# Patient Record
Sex: FEMALE | Race: WHITE | NOT HISPANIC OR LATINO | Employment: OTHER | RURAL
[De-identification: names, ages, dates, MRNs, and addresses within clinical notes are randomized per-mention and may not be internally consistent; named-entity substitution may affect disease eponyms.]

---

## 2021-08-19 VITALS
HEART RATE: 66 BPM | BODY MASS INDEX: 31.08 KG/M2 | SYSTOLIC BLOOD PRESSURE: 116 MMHG | DIASTOLIC BLOOD PRESSURE: 70 MMHG | HEIGHT: 69 IN | WEIGHT: 209.81 LBS

## 2021-08-19 RX ORDER — ALPRAZOLAM 0.25 MG/1
TABLET ORAL 3 TIMES DAILY
COMMUNITY
End: 2022-08-23 | Stop reason: SDUPTHER

## 2021-08-19 RX ORDER — METOPROLOL SUCCINATE 25 MG/1
25 TABLET, EXTENDED RELEASE ORAL DAILY
COMMUNITY
End: 2021-08-23 | Stop reason: SDUPTHER

## 2021-08-19 RX ORDER — ATORVASTATIN CALCIUM 20 MG/1
20 TABLET, FILM COATED ORAL DAILY
COMMUNITY
End: 2021-08-23 | Stop reason: SDUPTHER

## 2021-08-19 RX ORDER — ASPIRIN 325 MG
81 TABLET ORAL DAILY
COMMUNITY

## 2021-08-19 RX ORDER — LANSOPRAZOLE 30 MG/1
30 CAPSULE, DELAYED RELEASE ORAL DAILY
COMMUNITY
End: 2021-08-23 | Stop reason: SDUPTHER

## 2021-08-19 RX ORDER — OLMESARTAN MEDOXOMIL AND HYDROCHLOROTHIAZIDE 40/25 40; 25 MG/1; MG/1
1 TABLET ORAL DAILY
COMMUNITY
End: 2021-08-23 | Stop reason: SDUPTHER

## 2021-08-23 ENCOUNTER — OFFICE VISIT (OUTPATIENT)
Dept: FAMILY MEDICINE | Facility: CLINIC | Age: 81
End: 2021-08-23
Payer: MEDICARE

## 2021-08-23 VITALS
HEART RATE: 72 BPM | DIASTOLIC BLOOD PRESSURE: 80 MMHG | TEMPERATURE: 97 F | RESPIRATION RATE: 16 BRPM | SYSTOLIC BLOOD PRESSURE: 140 MMHG | BODY MASS INDEX: 30.66 KG/M2 | WEIGHT: 207 LBS | HEIGHT: 69 IN

## 2021-08-23 DIAGNOSIS — I10 HYPERTENSION, UNSPECIFIED TYPE: Primary | ICD-10-CM

## 2021-08-23 DIAGNOSIS — E78.5 HYPERLIPIDEMIA, UNSPECIFIED HYPERLIPIDEMIA TYPE: ICD-10-CM

## 2021-08-23 DIAGNOSIS — K21.9 GASTROESOPHAGEAL REFLUX DISEASE, UNSPECIFIED WHETHER ESOPHAGITIS PRESENT: ICD-10-CM

## 2021-08-23 LAB
ALBUMIN SERPL BCP-MCNC: 3.4 G/DL (ref 3.5–5)
ALBUMIN/GLOB SERPL: 0.8 {RATIO}
ALP SERPL-CCNC: 103 U/L (ref 55–142)
ALT SERPL W P-5'-P-CCNC: 23 U/L (ref 13–56)
ANION GAP SERPL CALCULATED.3IONS-SCNC: 10 MMOL/L (ref 7–16)
AST SERPL W P-5'-P-CCNC: 20 U/L (ref 15–37)
BASOPHILS # BLD AUTO: 0.06 K/UL (ref 0–0.2)
BASOPHILS NFR BLD AUTO: 0.7 % (ref 0–1)
BILIRUB SERPL-MCNC: 0.6 MG/DL (ref 0–1.2)
BUN SERPL-MCNC: 17 MG/DL (ref 7–18)
BUN/CREAT SERPL: 23 (ref 6–20)
CALCIUM SERPL-MCNC: 9 MG/DL (ref 8.5–10.1)
CHLORIDE SERPL-SCNC: 103 MMOL/L (ref 98–107)
CHOLEST SERPL-MCNC: 136 MG/DL (ref 0–200)
CHOLEST/HDLC SERPL: 3 {RATIO}
CO2 SERPL-SCNC: 31 MMOL/L (ref 21–32)
CREAT SERPL-MCNC: 0.75 MG/DL (ref 0.55–1.02)
DIFFERENTIAL METHOD BLD: ABNORMAL
EOSINOPHIL # BLD AUTO: 0.23 K/UL (ref 0–0.5)
EOSINOPHIL NFR BLD AUTO: 2.8 % (ref 1–4)
ERYTHROCYTE [DISTWIDTH] IN BLOOD BY AUTOMATED COUNT: 13.2 % (ref 11.5–14.5)
GLOBULIN SER-MCNC: 4.3 G/DL (ref 2–4)
GLUCOSE SERPL-MCNC: 112 MG/DL (ref 74–106)
HCT VFR BLD AUTO: 41.9 % (ref 38–47)
HDLC SERPL-MCNC: 46 MG/DL (ref 40–60)
HGB BLD-MCNC: 13.3 G/DL (ref 12–16)
IMM GRANULOCYTES # BLD AUTO: 0.03 K/UL (ref 0–0.04)
IMM GRANULOCYTES NFR BLD: 0.4 % (ref 0–0.4)
LDLC SERPL CALC-MCNC: 66 MG/DL
LDLC/HDLC SERPL: 1.4 {RATIO}
LYMPHOCYTES # BLD AUTO: 1.58 K/UL (ref 1–4.8)
LYMPHOCYTES NFR BLD AUTO: 19.1 % (ref 27–41)
MCH RBC QN AUTO: 28.6 PG (ref 27–31)
MCHC RBC AUTO-ENTMCNC: 31.7 G/DL (ref 32–36)
MCV RBC AUTO: 90.1 FL (ref 80–96)
MONOCYTES # BLD AUTO: 0.6 K/UL (ref 0–0.8)
MONOCYTES NFR BLD AUTO: 7.2 % (ref 2–6)
MPC BLD CALC-MCNC: 11 FL (ref 9.4–12.4)
NEUTROPHILS # BLD AUTO: 5.79 K/UL (ref 1.8–7.7)
NEUTROPHILS NFR BLD AUTO: 69.8 % (ref 53–65)
NONHDLC SERPL-MCNC: 90 MG/DL
NRBC # BLD AUTO: 0 X10E3/UL
NRBC, AUTO (.00): 0 %
PLATELET # BLD AUTO: 338 K/UL (ref 150–400)
POTASSIUM SERPL-SCNC: 3.7 MMOL/L (ref 3.5–5.1)
PROT SERPL-MCNC: 7.7 G/DL (ref 6.4–8.2)
RBC # BLD AUTO: 4.65 M/UL (ref 4.2–5.4)
SODIUM SERPL-SCNC: 140 MMOL/L (ref 136–145)
TRIGL SERPL-MCNC: 120 MG/DL (ref 35–150)
VLDLC SERPL-MCNC: 24 MG/DL
WBC # BLD AUTO: 8.29 K/UL (ref 4.5–11)

## 2021-08-23 PROCEDURE — 80053 COMPREHEN METABOLIC PANEL: CPT | Mod: ,,, | Performed by: CLINICAL MEDICAL LABORATORY

## 2021-08-23 PROCEDURE — 85025 COMPLETE CBC W/AUTO DIFF WBC: CPT | Mod: ,,, | Performed by: CLINICAL MEDICAL LABORATORY

## 2021-08-23 PROCEDURE — 80061 LIPID PANEL: ICD-10-PCS | Mod: ,,, | Performed by: CLINICAL MEDICAL LABORATORY

## 2021-08-23 PROCEDURE — 80053 COMPREHENSIVE METABOLIC PANEL: ICD-10-PCS | Mod: ,,, | Performed by: CLINICAL MEDICAL LABORATORY

## 2021-08-23 PROCEDURE — 85025 CBC WITH DIFFERENTIAL: ICD-10-PCS | Mod: ,,, | Performed by: CLINICAL MEDICAL LABORATORY

## 2021-08-23 PROCEDURE — 99214 OFFICE O/P EST MOD 30 MIN: CPT | Mod: ,,, | Performed by: FAMILY MEDICINE

## 2021-08-23 PROCEDURE — 80061 LIPID PANEL: CPT | Mod: ,,, | Performed by: CLINICAL MEDICAL LABORATORY

## 2021-08-23 PROCEDURE — 99214 PR OFFICE/OUTPT VISIT, EST, LEVL IV, 30-39 MIN: ICD-10-PCS | Mod: ,,, | Performed by: FAMILY MEDICINE

## 2021-08-23 RX ORDER — LANSOPRAZOLE 30 MG/1
30 CAPSULE, DELAYED RELEASE ORAL DAILY
Qty: 90 CAPSULE | Refills: 3 | Status: SHIPPED | OUTPATIENT
Start: 2021-08-23 | End: 2022-08-23 | Stop reason: SDUPTHER

## 2021-08-23 RX ORDER — METOPROLOL SUCCINATE 25 MG/1
25 TABLET, EXTENDED RELEASE ORAL DAILY
Qty: 90 TABLET | Refills: 3 | Status: SHIPPED | OUTPATIENT
Start: 2021-08-23 | End: 2022-08-23 | Stop reason: SDUPTHER

## 2021-08-23 RX ORDER — ATORVASTATIN CALCIUM 20 MG/1
20 TABLET, FILM COATED ORAL DAILY
Qty: 90 TABLET | Refills: 3 | Status: SHIPPED | OUTPATIENT
Start: 2021-08-23 | End: 2022-08-23 | Stop reason: SDUPTHER

## 2021-08-23 RX ORDER — OLMESARTAN MEDOXOMIL AND HYDROCHLOROTHIAZIDE 40/25 40; 25 MG/1; MG/1
1 TABLET ORAL DAILY
Qty: 90 TABLET | Refills: 3 | Status: SHIPPED | OUTPATIENT
Start: 2021-08-23 | End: 2022-08-23 | Stop reason: SDUPTHER

## 2021-09-28 ENCOUNTER — OFFICE VISIT (OUTPATIENT)
Dept: CARDIOLOGY | Facility: CLINIC | Age: 81
End: 2021-09-28
Payer: MEDICARE

## 2021-09-28 VITALS
BODY MASS INDEX: 30.81 KG/M2 | HEIGHT: 69 IN | WEIGHT: 208 LBS | DIASTOLIC BLOOD PRESSURE: 60 MMHG | HEART RATE: 74 BPM | OXYGEN SATURATION: 96 % | SYSTOLIC BLOOD PRESSURE: 130 MMHG

## 2021-09-28 DIAGNOSIS — E78.5 HYPERLIPIDEMIA, UNSPECIFIED HYPERLIPIDEMIA TYPE: ICD-10-CM

## 2021-09-28 DIAGNOSIS — I10 HYPERTENSION, UNSPECIFIED TYPE: ICD-10-CM

## 2021-09-28 DIAGNOSIS — I48.11 LONGSTANDING PERSISTENT ATRIAL FIBRILLATION: ICD-10-CM

## 2021-09-28 DIAGNOSIS — I48.91 ATRIAL FIBRILLATION, UNSPECIFIED TYPE: Primary | ICD-10-CM

## 2021-09-28 PROCEDURE — 93005 ELECTROCARDIOGRAM TRACING: CPT | Mod: PBBFAC | Performed by: INTERNAL MEDICINE

## 2021-09-28 PROCEDURE — 99214 OFFICE O/P EST MOD 30 MIN: CPT | Mod: S$PBB,,, | Performed by: NURSE PRACTITIONER

## 2021-09-28 PROCEDURE — 93010 ELECTROCARDIOGRAM REPORT: CPT | Mod: S$PBB,,, | Performed by: INTERNAL MEDICINE

## 2021-09-28 PROCEDURE — 99213 OFFICE O/P EST LOW 20 MIN: CPT | Mod: PBBFAC | Performed by: NURSE PRACTITIONER

## 2021-09-28 PROCEDURE — 93010 EKG 12-LEAD: ICD-10-PCS | Mod: S$PBB,,, | Performed by: INTERNAL MEDICINE

## 2021-09-28 PROCEDURE — 99214 PR OFFICE/OUTPT VISIT, EST, LEVL IV, 30-39 MIN: ICD-10-PCS | Mod: S$PBB,,, | Performed by: NURSE PRACTITIONER

## 2021-10-13 ENCOUNTER — OFFICE VISIT (OUTPATIENT)
Dept: FAMILY MEDICINE | Facility: CLINIC | Age: 81
End: 2021-10-13
Payer: MEDICARE

## 2021-10-13 VITALS
HEIGHT: 69 IN | SYSTOLIC BLOOD PRESSURE: 120 MMHG | DIASTOLIC BLOOD PRESSURE: 84 MMHG | BODY MASS INDEX: 30.36 KG/M2 | HEART RATE: 64 BPM | WEIGHT: 205 LBS | RESPIRATION RATE: 16 BRPM

## 2021-10-13 DIAGNOSIS — R30.0 DYSURIA: Primary | ICD-10-CM

## 2021-10-13 LAB
BACTERIA #/AREA URNS HPF: ABNORMAL /HPF
BILIRUB UR QL STRIP: NEGATIVE
CLARITY UR: CLEAR
COLOR UR: ABNORMAL
GLUCOSE UR STRIP-MCNC: 100 MG/DL
KETONES UR STRIP-SCNC: ABNORMAL MG/DL
LEUKOCYTE ESTERASE UR QL STRIP: ABNORMAL
NITRITE UR QL STRIP: POSITIVE
PH UR STRIP: 5.5 PH UNITS
PROT UR QL STRIP: 30
RBC # UR STRIP: NEGATIVE /UL
RBC #/AREA URNS HPF: ABNORMAL /HPF
SP GR UR STRIP: 1.02
SQUAMOUS #/AREA URNS LPF: ABNORMAL /LPF
UROBILINOGEN UR STRIP-ACNC: 4 MG/DL
WBC #/AREA URNS HPF: ABNORMAL /HPF

## 2021-10-13 PROCEDURE — 87086 CULTURE, URINE: ICD-10-PCS | Mod: ,,, | Performed by: CLINICAL MEDICAL LABORATORY

## 2021-10-13 PROCEDURE — 81001 URINALYSIS AUTO W/SCOPE: CPT | Mod: ,,, | Performed by: CLINICAL MEDICAL LABORATORY

## 2021-10-13 PROCEDURE — 87186 CULTURE, URINE: ICD-10-PCS | Mod: ,,, | Performed by: CLINICAL MEDICAL LABORATORY

## 2021-10-13 PROCEDURE — 87077 CULTURE AEROBIC IDENTIFY: CPT | Mod: ,,, | Performed by: CLINICAL MEDICAL LABORATORY

## 2021-10-13 PROCEDURE — 87186 SC STD MICRODIL/AGAR DIL: CPT | Mod: ,,, | Performed by: CLINICAL MEDICAL LABORATORY

## 2021-10-13 PROCEDURE — 99213 OFFICE O/P EST LOW 20 MIN: CPT | Mod: ,,, | Performed by: FAMILY MEDICINE

## 2021-10-13 PROCEDURE — 81001 URINALYSIS, REFLEX TO URINE CULTURE: ICD-10-PCS | Mod: ,,, | Performed by: CLINICAL MEDICAL LABORATORY

## 2021-10-13 PROCEDURE — 87086 URINE CULTURE/COLONY COUNT: CPT | Mod: ,,, | Performed by: CLINICAL MEDICAL LABORATORY

## 2021-10-13 PROCEDURE — 99213 PR OFFICE/OUTPT VISIT, EST, LEVL III, 20-29 MIN: ICD-10-PCS | Mod: ,,, | Performed by: FAMILY MEDICINE

## 2021-10-13 PROCEDURE — 87077 CULTURE, URINE: ICD-10-PCS | Mod: ,,, | Performed by: CLINICAL MEDICAL LABORATORY

## 2021-10-13 RX ORDER — NITROFURANTOIN 25; 75 MG/1; MG/1
100 CAPSULE ORAL 2 TIMES DAILY
Qty: 14 CAPSULE | Refills: 0 | Status: SHIPPED | OUTPATIENT
Start: 2021-10-13 | End: 2022-02-28 | Stop reason: ALTCHOICE

## 2021-10-15 LAB — UA COMPLETE W REFLEX CULTURE PNL UR: ABNORMAL

## 2021-10-19 ENCOUNTER — TELEPHONE (OUTPATIENT)
Dept: FAMILY MEDICINE | Facility: CLINIC | Age: 81
End: 2021-10-19

## 2021-11-17 ENCOUNTER — OFFICE VISIT (OUTPATIENT)
Dept: FAMILY MEDICINE | Facility: CLINIC | Age: 81
End: 2021-11-17
Payer: MEDICARE

## 2021-11-17 VITALS
DIASTOLIC BLOOD PRESSURE: 80 MMHG | BODY MASS INDEX: 30.26 KG/M2 | WEIGHT: 204.31 LBS | RESPIRATION RATE: 18 BRPM | SYSTOLIC BLOOD PRESSURE: 150 MMHG | HEART RATE: 68 BPM | HEIGHT: 69 IN

## 2021-11-17 DIAGNOSIS — I10 PRIMARY HYPERTENSION: ICD-10-CM

## 2021-11-17 DIAGNOSIS — N30.00 ACUTE CYSTITIS WITHOUT HEMATURIA: ICD-10-CM

## 2021-11-17 DIAGNOSIS — R30.0 DYSURIA: Primary | ICD-10-CM

## 2021-11-17 LAB
BACTERIA #/AREA URNS HPF: ABNORMAL /HPF
BILIRUB UR QL STRIP: NEGATIVE
CLARITY UR: CLEAR
COLOR UR: YELLOW
GLUCOSE UR STRIP-MCNC: NEGATIVE MG/DL
KETONES UR STRIP-SCNC: NEGATIVE MG/DL
LEUKOCYTE ESTERASE UR QL STRIP: ABNORMAL
MUCOUS THREADS #/AREA URNS HPF: ABNORMAL /HPF
NITRITE UR QL STRIP: NEGATIVE
PH UR STRIP: 7 PH UNITS
PROT UR QL STRIP: NEGATIVE
RBC # UR STRIP: NEGATIVE /UL
RBC #/AREA URNS HPF: ABNORMAL /HPF
SP GR UR STRIP: 1.02
SQUAMOUS #/AREA URNS LPF: ABNORMAL /LPF
UROBILINOGEN UR STRIP-ACNC: 1 MG/DL
WBC #/AREA URNS HPF: ABNORMAL /HPF

## 2021-11-17 PROCEDURE — 87077 CULTURE, URINE: ICD-10-PCS | Mod: ,,, | Performed by: CLINICAL MEDICAL LABORATORY

## 2021-11-17 PROCEDURE — 87186 CULTURE, URINE: ICD-10-PCS | Mod: ,,, | Performed by: CLINICAL MEDICAL LABORATORY

## 2021-11-17 PROCEDURE — 87086 CULTURE, URINE: ICD-10-PCS | Mod: ,,, | Performed by: CLINICAL MEDICAL LABORATORY

## 2021-11-17 PROCEDURE — 87077 CULTURE AEROBIC IDENTIFY: CPT | Mod: ,,, | Performed by: CLINICAL MEDICAL LABORATORY

## 2021-11-17 PROCEDURE — 81001 URINALYSIS AUTO W/SCOPE: CPT | Mod: ,,, | Performed by: CLINICAL MEDICAL LABORATORY

## 2021-11-17 PROCEDURE — 81001 URINALYSIS, REFLEX TO URINE CULTURE: ICD-10-PCS | Mod: ,,, | Performed by: CLINICAL MEDICAL LABORATORY

## 2021-11-17 PROCEDURE — 99213 PR OFFICE/OUTPT VISIT, EST, LEVL III, 20-29 MIN: ICD-10-PCS | Mod: ,,, | Performed by: FAMILY MEDICINE

## 2021-11-17 PROCEDURE — 87086 URINE CULTURE/COLONY COUNT: CPT | Mod: ,,, | Performed by: CLINICAL MEDICAL LABORATORY

## 2021-11-17 PROCEDURE — 99213 OFFICE O/P EST LOW 20 MIN: CPT | Mod: ,,, | Performed by: FAMILY MEDICINE

## 2021-11-17 PROCEDURE — 87186 SC STD MICRODIL/AGAR DIL: CPT | Mod: ,,, | Performed by: CLINICAL MEDICAL LABORATORY

## 2021-11-19 LAB — UA COMPLETE W REFLEX CULTURE PNL UR: ABNORMAL

## 2021-11-19 RX ORDER — SULFAMETHOXAZOLE AND TRIMETHOPRIM 800; 160 MG/1; MG/1
1 TABLET ORAL 2 TIMES DAILY
Qty: 10 TABLET | Refills: 0 | Status: SHIPPED | OUTPATIENT
Start: 2021-11-19 | End: 2022-02-28 | Stop reason: ALTCHOICE

## 2022-02-23 ENCOUNTER — OFFICE VISIT (OUTPATIENT)
Dept: FAMILY MEDICINE | Facility: CLINIC | Age: 82
End: 2022-02-23
Payer: MEDICARE

## 2022-02-23 VITALS
HEIGHT: 69 IN | BODY MASS INDEX: 30.48 KG/M2 | SYSTOLIC BLOOD PRESSURE: 132 MMHG | RESPIRATION RATE: 18 BRPM | DIASTOLIC BLOOD PRESSURE: 74 MMHG | WEIGHT: 205.81 LBS | HEART RATE: 78 BPM

## 2022-02-23 DIAGNOSIS — I10 PRIMARY HYPERTENSION: ICD-10-CM

## 2022-02-23 DIAGNOSIS — E78.5 HYPERLIPIDEMIA, UNSPECIFIED HYPERLIPIDEMIA TYPE: Primary | ICD-10-CM

## 2022-02-23 DIAGNOSIS — I48.11 LONGSTANDING PERSISTENT ATRIAL FIBRILLATION: ICD-10-CM

## 2022-02-23 LAB
ALBUMIN SERPL BCP-MCNC: 3.5 G/DL (ref 3.5–5)
ALBUMIN/GLOB SERPL: 0.8 {RATIO}
ALP SERPL-CCNC: 89 U/L (ref 55–142)
ALT SERPL W P-5'-P-CCNC: 23 U/L (ref 13–56)
ANION GAP SERPL CALCULATED.3IONS-SCNC: 7 MMOL/L (ref 7–16)
AST SERPL W P-5'-P-CCNC: 20 U/L (ref 15–37)
BASOPHILS # BLD AUTO: 0.07 K/UL (ref 0–0.2)
BASOPHILS NFR BLD AUTO: 0.9 % (ref 0–1)
BILIRUB SERPL-MCNC: 0.5 MG/DL (ref 0–1.2)
BUN SERPL-MCNC: 19 MG/DL (ref 7–18)
BUN/CREAT SERPL: 20 (ref 6–20)
CALCIUM SERPL-MCNC: 9.4 MG/DL (ref 8.5–10.1)
CHLORIDE SERPL-SCNC: 103 MMOL/L (ref 98–107)
CHOLEST SERPL-MCNC: 163 MG/DL (ref 0–200)
CHOLEST/HDLC SERPL: 3.5 {RATIO}
CO2 SERPL-SCNC: 31 MMOL/L (ref 21–32)
CREAT SERPL-MCNC: 0.94 MG/DL (ref 0.55–1.02)
DIFFERENTIAL METHOD BLD: ABNORMAL
EOSINOPHIL # BLD AUTO: 0.25 K/UL (ref 0–0.5)
EOSINOPHIL NFR BLD AUTO: 3.2 % (ref 1–4)
ERYTHROCYTE [DISTWIDTH] IN BLOOD BY AUTOMATED COUNT: 12.5 % (ref 11.5–14.5)
GLOBULIN SER-MCNC: 4.5 G/DL (ref 2–4)
GLUCOSE SERPL-MCNC: 112 MG/DL (ref 74–106)
HCT VFR BLD AUTO: 43.4 % (ref 38–47)
HDLC SERPL-MCNC: 47 MG/DL (ref 40–60)
HGB BLD-MCNC: 13.8 G/DL (ref 12–16)
IMM GRANULOCYTES # BLD AUTO: 0.02 K/UL (ref 0–0.04)
IMM GRANULOCYTES NFR BLD: 0.3 % (ref 0–0.4)
LDLC SERPL CALC-MCNC: 92 MG/DL
LDLC/HDLC SERPL: 2 {RATIO}
LYMPHOCYTES # BLD AUTO: 1.72 K/UL (ref 1–4.8)
LYMPHOCYTES NFR BLD AUTO: 22 % (ref 27–41)
MCH RBC QN AUTO: 29 PG (ref 27–31)
MCHC RBC AUTO-ENTMCNC: 31.8 G/DL (ref 32–36)
MCV RBC AUTO: 91.2 FL (ref 80–96)
MONOCYTES # BLD AUTO: 0.61 K/UL (ref 0–0.8)
MONOCYTES NFR BLD AUTO: 7.8 % (ref 2–6)
MPC BLD CALC-MCNC: 11.3 FL (ref 9.4–12.4)
NEUTROPHILS # BLD AUTO: 5.15 K/UL (ref 1.8–7.7)
NEUTROPHILS NFR BLD AUTO: 65.8 % (ref 53–65)
NONHDLC SERPL-MCNC: 116 MG/DL
NRBC # BLD AUTO: 0 X10E3/UL
NRBC, AUTO (.00): 0 %
PLATELET # BLD AUTO: 315 K/UL (ref 150–400)
POTASSIUM SERPL-SCNC: 3.6 MMOL/L (ref 3.5–5.1)
PROT SERPL-MCNC: 8 G/DL (ref 6.4–8.2)
RBC # BLD AUTO: 4.76 M/UL (ref 4.2–5.4)
SODIUM SERPL-SCNC: 137 MMOL/L (ref 136–145)
TRIGL SERPL-MCNC: 118 MG/DL (ref 35–150)
VLDLC SERPL-MCNC: 24 MG/DL
WBC # BLD AUTO: 7.82 K/UL (ref 4.5–11)

## 2022-02-23 PROCEDURE — 85025 COMPLETE CBC W/AUTO DIFF WBC: CPT | Mod: ,,, | Performed by: CLINICAL MEDICAL LABORATORY

## 2022-02-23 PROCEDURE — 80053 COMPREHENSIVE METABOLIC PANEL: ICD-10-PCS | Mod: ,,, | Performed by: CLINICAL MEDICAL LABORATORY

## 2022-02-23 PROCEDURE — 99214 PR OFFICE/OUTPT VISIT, EST, LEVL IV, 30-39 MIN: ICD-10-PCS | Mod: ,,, | Performed by: FAMILY MEDICINE

## 2022-02-23 PROCEDURE — 85025 CBC WITH DIFFERENTIAL: ICD-10-PCS | Mod: ,,, | Performed by: CLINICAL MEDICAL LABORATORY

## 2022-02-23 PROCEDURE — 80053 COMPREHEN METABOLIC PANEL: CPT | Mod: ,,, | Performed by: CLINICAL MEDICAL LABORATORY

## 2022-02-23 PROCEDURE — 80061 LIPID PANEL: ICD-10-PCS | Mod: ,,, | Performed by: CLINICAL MEDICAL LABORATORY

## 2022-02-23 PROCEDURE — 99214 OFFICE O/P EST MOD 30 MIN: CPT | Mod: ,,, | Performed by: FAMILY MEDICINE

## 2022-02-23 PROCEDURE — 80061 LIPID PANEL: CPT | Mod: ,,, | Performed by: CLINICAL MEDICAL LABORATORY

## 2022-02-23 NOTE — PROGRESS NOTES
Noe Haile MD        PATIENT NAME: Tonya Sandoval  : 1940  DATE: 22  MRN: [id number]      Billing Provider: Noe Haile MD  Level of Service: IN OFFICE/OUTPT VISIT, EST, LEVL IV, 30-39 MIN  Patient PCP Information     Provider PCP Type    Noe Haile MD General          Reason for Visit / Chief Complaint: Hypertension (6 mo) and Hyperlipidemia       Update PCP  Update Chief Complaint         History of Present Illness / Problem Focused Workflow     Tonya Sandoval presents to the clinic with Hypertension (6 mo) and Hyperlipidemia     Routine followup.  No significant interval change        Review of Systems     Review of Systems   Constitutional: Negative for activity change, appetite change, fever and unexpected weight change.   HENT: Negative for congestion, rhinorrhea, sinus pressure, sinus pain, sore throat and trouble swallowing.    Eyes: Negative for photophobia, pain, discharge and visual disturbance.   Respiratory: Negative for cough, chest tightness, wheezing and stridor.    Cardiovascular: Negative for chest pain, palpitations and leg swelling.   Gastrointestinal: Negative for abdominal pain, blood in stool, constipation, diarrhea and nausea.   Endocrine: Negative for polydipsia, polyphagia and polyuria.   Genitourinary: Negative for difficulty urinating, flank pain and hematuria.   Musculoskeletal: Negative for arthralgias and neck pain.   Skin: Negative for rash.   Allergic/Immunologic: Negative for food allergies.   Neurological: Negative for dizziness, tremors, seizures, syncope, weakness (global weakness) and headaches.   Psychiatric/Behavioral: Negative for behavioral problems, confusion, decreased concentration, dysphoric mood and hallucinations. The patient is not nervous/anxious.         Medical / Social / Family History     Past Medical History:   Diagnosis Date    Atrial fibrillation     GERD (gastroesophageal reflux disease)     Hyperlipidemia      Hypertension     OA (osteoarthritis)        Past Surgical History:   Procedure Laterality Date    CARDIAC CATHETERIZATION      HYSTERECTOMY         Social History  Ms.  reports that she has never smoked. She has never used smokeless tobacco. She reports that she does not drink alcohol and does not use drugs.    Family History  Ms.'s family history includes Brain cancer in her father; Cancer in her sister; Heart disease in her brother.    Medications and Allergies     Medications  Outpatient Medications Marked as Taking for the 2/23/22 encounter (Office Visit) with Noe Haile MD   Medication Sig Dispense Refill    ALPRAZolam (XANAX) 0.25 MG tablet Take by mouth 3 (three) times daily.      aspirin 325 MG tablet Take 81 mg by mouth once daily.      atorvastatin (LIPITOR) 20 MG tablet Take 1 tablet (20 mg total) by mouth once daily. 90 tablet 3    lansoprazole (PREVACID) 30 MG capsule Take 1 capsule (30 mg total) by mouth once daily. 90 capsule 3    metoprolol succinate (TOPROL-XL) 25 MG 24 hr tablet Take 1 tablet (25 mg total) by mouth once daily. 90 tablet 3    olmesartan-hydrochlorothiazide (BENICAR HCT) 40-25 mg per tablet Take 1 tablet by mouth once daily. 90 tablet 3       Allergies  Review of patient's allergies indicates:   Allergen Reactions    Codeine     Nickel sutures [surgical stainless steel]        Physical Examination     Vitals:    02/23/22 0827   BP: 132/74   Pulse: 78   Resp: 18     Physical Exam  Constitutional:       General: She is not in acute distress.     Appearance: Normal appearance.   HENT:      Head: Normocephalic.      Right Ear: Tympanic membrane and ear canal normal.      Left Ear: Tympanic membrane and ear canal normal.      Nose: Nose normal.      Mouth/Throat:      Mouth: Mucous membranes are moist.      Pharynx: No oropharyngeal exudate.   Eyes:      Extraocular Movements: Extraocular movements intact.      Pupils: Pupils are equal, round, and reactive to light.    Cardiovascular:      Rate and Rhythm: Normal rate and regular rhythm.      Heart sounds: No murmur heard.  Pulmonary:      Effort: Pulmonary effort is normal.      Breath sounds: Normal breath sounds. No wheezing.   Abdominal:      General: Abdomen is flat. Bowel sounds are normal.      Palpations: Abdomen is soft.      Hernia: No hernia is present.   Musculoskeletal:         General: Normal range of motion.      Cervical back: Normal range of motion and neck supple.      Right lower leg: No edema.      Left lower leg: No edema.   Lymphadenopathy:      Cervical: No cervical adenopathy.   Skin:     General: Skin is warm and dry.      Coloration: Skin is not jaundiced.      Findings: No lesion.   Neurological:      General: No focal deficit present.      Mental Status: She is alert and oriented to person, place, and time.      Cranial Nerves: No cranial nerve deficit.      Gait: Gait normal.   Psychiatric:         Mood and Affect: Mood normal.         Behavior: Behavior normal.         Judgment: Judgment normal.          Assessment and Plan (including Health Maintenance)      Problem List  Smart Sets  Document Outside HM   :    Plan:   Hyperlipidemia, unspecified hyperlipidemia type  -     Lipid Panel    Primary hypertension  -     Comprehensive Metabolic Panel  -     CBC Auto Differential    Longstanding persistent atrial fibrillation           Health Maintenance Due   Topic Date Due    COVID-19 Vaccine (1) Never done    TETANUS VACCINE  Never done    DEXA Scan  Never done    Shingles Vaccine (1 of 2) Never done    Pneumococcal Vaccines (Age 65+) (1 of 1 - PPSV23) Never done    Influenza Vaccine (1) Never done       Problem List Items Addressed This Visit        Cardiac/Vascular    Atrial fibrillation    Overview     Patient had gyno bleeding while on Xarelto and has declined to try other anticoagulants.            Hyperlipidemia - Primary    Relevant Orders    Lipid Panel (Completed)    Hypertension     Relevant Orders    Comprehensive Metabolic Panel (Completed)    CBC Auto Differential (Completed)          Health Maintenance Topics with due status: Not Due       Topic Last Completion Date    Lipid Panel 02/23/2022       Future Appointments   Date Time Provider Department Center   8/23/2022  8:30 AM Noe Haile MD [facility]        There are no Patient Instructions on file for this visit.  Follow up in about 6 months (around 8/23/2022) for routine followup.     Signature:  Noe Haile MD      Date of encounter: 2/23/22

## 2022-03-11 DIAGNOSIS — Z71.89 COMPLEX CARE COORDINATION: ICD-10-CM

## 2022-07-22 ENCOUNTER — HOSPITAL ENCOUNTER (OUTPATIENT)
Dept: RADIOLOGY | Facility: HOSPITAL | Age: 82
Discharge: HOME | End: 2022-07-22
Attending: ORTHOPAEDIC SURGERY
Payer: MEDICARE

## 2022-07-22 DIAGNOSIS — M25.561 ACUTE PAIN OF RIGHT KNEE: ICD-10-CM

## 2022-07-22 PROCEDURE — 73564 X-RAY EXAM KNEE 4 OR MORE: CPT | Mod: TC,RT

## 2022-08-23 ENCOUNTER — OFFICE VISIT (OUTPATIENT)
Dept: FAMILY MEDICINE | Facility: CLINIC | Age: 82
End: 2022-08-23
Payer: MEDICARE

## 2022-08-23 VITALS
DIASTOLIC BLOOD PRESSURE: 80 MMHG | HEIGHT: 69 IN | SYSTOLIC BLOOD PRESSURE: 148 MMHG | BODY MASS INDEX: 30.07 KG/M2 | WEIGHT: 203 LBS | HEART RATE: 72 BPM | RESPIRATION RATE: 16 BRPM

## 2022-08-23 DIAGNOSIS — I10 PRIMARY HYPERTENSION: ICD-10-CM

## 2022-08-23 DIAGNOSIS — F41.9 ANXIETY: ICD-10-CM

## 2022-08-23 DIAGNOSIS — I10 HYPERTENSION, UNSPECIFIED TYPE: ICD-10-CM

## 2022-08-23 DIAGNOSIS — E78.5 HYPERLIPIDEMIA, UNSPECIFIED HYPERLIPIDEMIA TYPE: Primary | ICD-10-CM

## 2022-08-23 DIAGNOSIS — K21.9 GASTROESOPHAGEAL REFLUX DISEASE, UNSPECIFIED WHETHER ESOPHAGITIS PRESENT: ICD-10-CM

## 2022-08-23 LAB
ALBUMIN SERPL BCP-MCNC: 3.5 G/DL (ref 3.5–5)
ALBUMIN/GLOB SERPL: 0.9 {RATIO}
ALP SERPL-CCNC: 106 U/L (ref 55–142)
ALT SERPL W P-5'-P-CCNC: 26 U/L (ref 13–56)
ANION GAP SERPL CALCULATED.3IONS-SCNC: 11 MMOL/L (ref 7–16)
AST SERPL W P-5'-P-CCNC: 18 U/L (ref 15–37)
BASOPHILS # BLD AUTO: 0.07 K/UL (ref 0–0.2)
BASOPHILS NFR BLD AUTO: 0.8 % (ref 0–1)
BILIRUB SERPL-MCNC: 0.4 MG/DL (ref 0–1.2)
BUN SERPL-MCNC: 18 MG/DL (ref 7–18)
BUN/CREAT SERPL: 21 (ref 6–20)
CALCIUM SERPL-MCNC: 9.4 MG/DL (ref 8.5–10.1)
CHLORIDE SERPL-SCNC: 104 MMOL/L (ref 98–107)
CHOLEST SERPL-MCNC: 153 MG/DL (ref 0–200)
CHOLEST/HDLC SERPL: 3.1 {RATIO}
CO2 SERPL-SCNC: 31 MMOL/L (ref 21–32)
CREAT SERPL-MCNC: 0.86 MG/DL (ref 0.55–1.02)
DIFFERENTIAL METHOD BLD: ABNORMAL
EGFR (NO RACE VARIABLE) (RUSH/TITUS): 68 ML/MIN/1.73M²
EOSINOPHIL # BLD AUTO: 0.31 K/UL (ref 0–0.5)
EOSINOPHIL NFR BLD AUTO: 3.4 % (ref 1–4)
ERYTHROCYTE [DISTWIDTH] IN BLOOD BY AUTOMATED COUNT: 12.9 % (ref 11.5–14.5)
GLOBULIN SER-MCNC: 3.8 G/DL (ref 2–4)
GLUCOSE SERPL-MCNC: 108 MG/DL (ref 74–106)
HCT VFR BLD AUTO: 42.5 % (ref 38–47)
HDLC SERPL-MCNC: 50 MG/DL (ref 40–60)
HGB BLD-MCNC: 13.8 G/DL (ref 12–16)
IMM GRANULOCYTES # BLD AUTO: 0.03 K/UL (ref 0–0.04)
IMM GRANULOCYTES NFR BLD: 0.3 % (ref 0–0.4)
LDLC SERPL CALC-MCNC: 80 MG/DL
LDLC/HDLC SERPL: 1.6 {RATIO}
LYMPHOCYTES # BLD AUTO: 1.81 K/UL (ref 1–4.8)
LYMPHOCYTES NFR BLD AUTO: 20.1 % (ref 27–41)
MCH RBC QN AUTO: 29.6 PG (ref 27–31)
MCHC RBC AUTO-ENTMCNC: 32.5 G/DL (ref 32–36)
MCV RBC AUTO: 91.2 FL (ref 80–96)
MONOCYTES # BLD AUTO: 0.7 K/UL (ref 0–0.8)
MONOCYTES NFR BLD AUTO: 7.8 % (ref 2–6)
MPC BLD CALC-MCNC: 11 FL (ref 9.4–12.4)
NEUTROPHILS # BLD AUTO: 6.1 K/UL (ref 1.8–7.7)
NEUTROPHILS NFR BLD AUTO: 67.6 % (ref 53–65)
NONHDLC SERPL-MCNC: 103 MG/DL
NRBC # BLD AUTO: 0 X10E3/UL
NRBC, AUTO (.00): 0 %
PLATELET # BLD AUTO: 345 K/UL (ref 150–400)
POTASSIUM SERPL-SCNC: 4.2 MMOL/L (ref 3.5–5.1)
PROT SERPL-MCNC: 7.3 G/DL (ref 6.4–8.2)
RBC # BLD AUTO: 4.66 M/UL (ref 4.2–5.4)
SODIUM SERPL-SCNC: 142 MMOL/L (ref 136–145)
TRIGL SERPL-MCNC: 113 MG/DL (ref 35–150)
VLDLC SERPL-MCNC: 23 MG/DL
WBC # BLD AUTO: 9.02 K/UL (ref 4.5–11)

## 2022-08-23 PROCEDURE — 80061 LIPID PANEL: CPT | Mod: ,,, | Performed by: CLINICAL MEDICAL LABORATORY

## 2022-08-23 PROCEDURE — 85025 CBC WITH DIFFERENTIAL: ICD-10-PCS | Mod: ,,, | Performed by: CLINICAL MEDICAL LABORATORY

## 2022-08-23 PROCEDURE — 80053 COMPREHENSIVE METABOLIC PANEL: ICD-10-PCS | Mod: ,,, | Performed by: CLINICAL MEDICAL LABORATORY

## 2022-08-23 PROCEDURE — 85025 COMPLETE CBC W/AUTO DIFF WBC: CPT | Mod: ,,, | Performed by: CLINICAL MEDICAL LABORATORY

## 2022-08-23 PROCEDURE — 99214 OFFICE O/P EST MOD 30 MIN: CPT | Mod: ,,, | Performed by: FAMILY MEDICINE

## 2022-08-23 PROCEDURE — 99214 PR OFFICE/OUTPT VISIT, EST, LEVL IV, 30-39 MIN: ICD-10-PCS | Mod: ,,, | Performed by: FAMILY MEDICINE

## 2022-08-23 PROCEDURE — 80053 COMPREHEN METABOLIC PANEL: CPT | Mod: ,,, | Performed by: CLINICAL MEDICAL LABORATORY

## 2022-08-23 PROCEDURE — 80061 LIPID PANEL: ICD-10-PCS | Mod: ,,, | Performed by: CLINICAL MEDICAL LABORATORY

## 2022-08-23 RX ORDER — ATORVASTATIN CALCIUM 20 MG/1
20 TABLET, FILM COATED ORAL DAILY
Qty: 90 TABLET | Refills: 3 | Status: SHIPPED | OUTPATIENT
Start: 2022-08-23 | End: 2023-08-16 | Stop reason: SDUPTHER

## 2022-08-23 RX ORDER — METOPROLOL SUCCINATE 25 MG/1
25 TABLET, EXTENDED RELEASE ORAL DAILY
Qty: 90 TABLET | Refills: 3 | Status: SHIPPED | OUTPATIENT
Start: 2022-08-23 | End: 2023-08-16 | Stop reason: SDUPTHER

## 2022-08-23 RX ORDER — LANSOPRAZOLE 30 MG/1
30 CAPSULE, DELAYED RELEASE ORAL DAILY
Qty: 90 CAPSULE | Refills: 3 | Status: SHIPPED | OUTPATIENT
Start: 2022-08-23 | End: 2023-08-16 | Stop reason: SDUPTHER

## 2022-08-23 RX ORDER — OLMESARTAN MEDOXOMIL AND HYDROCHLOROTHIAZIDE 40/25 40; 25 MG/1; MG/1
1 TABLET ORAL DAILY
Qty: 90 TABLET | Refills: 3 | Status: SHIPPED | OUTPATIENT
Start: 2022-08-23 | End: 2023-08-16 | Stop reason: SDUPTHER

## 2022-08-23 RX ORDER — ALPRAZOLAM 0.25 MG/1
0.25 TABLET ORAL 3 TIMES DAILY
Qty: 30 TABLET | Refills: 2 | Status: SHIPPED | OUTPATIENT
Start: 2022-08-23 | End: 2024-01-01 | Stop reason: CLARIF

## 2022-08-26 NOTE — PROGRESS NOTES
Noe Haile MD        PATIENT NAME: Tonya Sandoval  : 1940  DATE: 22  MRN: [id number]      Billing Provider: Noe Haile MD  Level of Service: MD OFFICE/OUTPT VISIT, EST, LEVL IV, 30-39 MIN  Patient PCP Information     Provider PCP Type    Noe Haile MD General          Reason for Visit / Chief Complaint: Hypertension and Hyperlipidemia (6 month check)       Update PCP  Update Chief Complaint         History of Present Illness / Problem Focused Workflow     Tonya Sandoval presents to the clinic with Hypertension and Hyperlipidemia (6 month check)     Patient is for follow-up hypertension hyperlipidemia.  Doing well overall.      Review of Systems     Review of Systems   Constitutional: Negative for activity change, appetite change, fever and unexpected weight change.   HENT: Negative for congestion, rhinorrhea, sinus pressure, sinus pain, sore throat and trouble swallowing.    Eyes: Negative for photophobia, pain, discharge and visual disturbance.   Respiratory: Negative for cough, chest tightness, wheezing and stridor.    Cardiovascular: Negative for chest pain, palpitations and leg swelling.   Gastrointestinal: Negative for abdominal pain, blood in stool, constipation, diarrhea and nausea.   Endocrine: Negative for polydipsia, polyphagia and polyuria.   Genitourinary: Negative for difficulty urinating, flank pain and hematuria.   Musculoskeletal: Negative for arthralgias and neck pain.   Skin: Negative for rash.   Allergic/Immunologic: Negative for food allergies.   Neurological: Negative for dizziness, tremors, seizures, syncope, weakness (global weakness) and headaches.   Psychiatric/Behavioral: Negative for behavioral problems, confusion, decreased concentration, dysphoric mood and hallucinations. The patient is not nervous/anxious.         Medical / Social / Family History     Past Medical History:   Diagnosis Date    Atrial fibrillation     GERD (gastroesophageal  reflux disease)     Hyperlipidemia     Hypertension     OA (osteoarthritis)        Past Surgical History:   Procedure Laterality Date    CARDIAC CATHETERIZATION      HYSTERECTOMY         Social History  Ms.  reports that she has never smoked. She has never used smokeless tobacco. She reports that she does not drink alcohol and does not use drugs.    Family History  Ms.'s family history includes Brain cancer in her father; Cancer in her sister; Heart disease in her brother.    Medications and Allergies     Medications  No outpatient medications have been marked as taking for the 8/23/22 encounter (Office Visit) with Noe Haile MD.       Allergies  Review of patient's allergies indicates:   Allergen Reactions    Codeine     Nickel sutures [surgical stainless steel]        Physical Examination     Vitals:    08/23/22 0829   BP: (!) 148/80   Pulse: 72   Resp: 16     Physical Exam  Constitutional:       General: She is not in acute distress.     Appearance: Normal appearance.   HENT:      Head: Normocephalic.      Right Ear: Tympanic membrane and ear canal normal.      Left Ear: Tympanic membrane and ear canal normal.      Nose: Nose normal.      Mouth/Throat:      Mouth: Mucous membranes are moist.      Pharynx: No oropharyngeal exudate.   Eyes:      Extraocular Movements: Extraocular movements intact.      Pupils: Pupils are equal, round, and reactive to light.   Cardiovascular:      Rate and Rhythm: Normal rate and regular rhythm.      Heart sounds: No murmur heard.  Pulmonary:      Effort: Pulmonary effort is normal.      Breath sounds: Normal breath sounds. No wheezing.   Abdominal:      General: Abdomen is flat. Bowel sounds are normal.      Palpations: Abdomen is soft.      Hernia: No hernia is present.   Musculoskeletal:         General: Normal range of motion.      Cervical back: Normal range of motion and neck supple.      Right lower leg: No edema.      Left lower leg: No edema.   Lymphadenopathy:       Cervical: No cervical adenopathy.   Skin:     General: Skin is warm and dry.      Coloration: Skin is not jaundiced.      Findings: No lesion.   Neurological:      General: No focal deficit present.      Mental Status: She is alert and oriented to person, place, and time.      Cranial Nerves: No cranial nerve deficit.      Gait: Gait normal.   Psychiatric:         Mood and Affect: Mood normal.         Behavior: Behavior normal.         Judgment: Judgment normal.          Assessment and Plan (including Health Maintenance)      Problem List  Smart Sets  Document Outside HM   :    Plan:   Hyperlipidemia, unspecified hyperlipidemia type  -     Lipid Panel  -     atorvastatin (LIPITOR) 20 MG tablet; Take 1 tablet (20 mg total) by mouth once daily.  Dispense: 90 tablet; Refill: 3    Primary hypertension  -     CBC Auto Differential  -     Comprehensive Metabolic Panel    Gastroesophageal reflux disease, unspecified whether esophagitis present  -     lansoprazole (PREVACID) 30 MG capsule; Take 1 capsule (30 mg total) by mouth once daily.  Dispense: 90 capsule; Refill: 3    Hypertension, unspecified type  -     metoprolol succinate (TOPROL-XL) 25 MG 24 hr tablet; Take 1 tablet (25 mg total) by mouth once daily.  Dispense: 90 tablet; Refill: 3  -     olmesartan-hydrochlorothiazide (BENICAR HCT) 40-25 mg per tablet; Take 1 tablet by mouth once daily.  Dispense: 90 tablet; Refill: 3    Anxiety  -     ALPRAZolam (XANAX) 0.25 MG tablet; Take 1 tablet (0.25 mg total) by mouth 3 (three) times daily.  Dispense: 30 tablet; Refill: 2           Health Maintenance Due   Topic Date Due    COVID-19 Vaccine (1) Never done    TETANUS VACCINE  Never done    DEXA Scan  Never done    Shingles Vaccine (1 of 2) Never done    Pneumococcal Vaccines (Age 65+) (1 - PCV) Never done       Problem List Items Addressed This Visit        Cardiac/Vascular    Hyperlipidemia - Primary    Relevant Medications    atorvastatin (LIPITOR) 20 MG tablet     Other Relevant Orders    Lipid Panel (Completed)    Hypertension    Relevant Medications    metoprolol succinate (TOPROL-XL) 25 MG 24 hr tablet    olmesartan-hydrochlorothiazide (BENICAR HCT) 40-25 mg per tablet    Other Relevant Orders    CBC Auto Differential (Completed)    Comprehensive Metabolic Panel (Completed)      Other Visit Diagnoses     Gastroesophageal reflux disease, unspecified whether esophagitis present        Relevant Medications    lansoprazole (PREVACID) 30 MG capsule    Anxiety        Relevant Medications    ALPRAZolam (XANAX) 0.25 MG tablet          Health Maintenance Topics with due status: Not Due       Topic Last Completion Date    Lipid Panel 08/23/2022    Influenza Vaccine Not Due       Future Appointments   Date Time Provider Department Center   2/24/2023  9:00 AM Noe Haile MD [facility]        There are no Patient Instructions on file for this visit.  Follow up in about 6 months (around 2/23/2023) for routine followup.     Signature:  Noe Haile MD      Date of encounter: 8/23/22

## 2022-10-09 DIAGNOSIS — Z71.89 COMPLEX CARE COORDINATION: ICD-10-CM

## 2022-10-18 ENCOUNTER — OFFICE VISIT (OUTPATIENT)
Dept: CARDIOLOGY | Facility: CLINIC | Age: 82
End: 2022-10-18
Payer: MEDICARE

## 2022-10-18 VITALS
BODY MASS INDEX: 30.75 KG/M2 | DIASTOLIC BLOOD PRESSURE: 82 MMHG | WEIGHT: 207.63 LBS | HEIGHT: 69 IN | HEART RATE: 67 BPM | OXYGEN SATURATION: 97 % | SYSTOLIC BLOOD PRESSURE: 128 MMHG

## 2022-10-18 DIAGNOSIS — I48.91 ATRIAL FIBRILLATION, UNSPECIFIED TYPE: Primary | ICD-10-CM

## 2022-10-18 PROCEDURE — 93005 ELECTROCARDIOGRAM TRACING: CPT | Mod: PBBFAC | Performed by: INTERNAL MEDICINE

## 2022-10-18 PROCEDURE — 93010 ELECTROCARDIOGRAM REPORT: CPT | Mod: S$PBB,,, | Performed by: INTERNAL MEDICINE

## 2022-10-18 PROCEDURE — 99214 OFFICE O/P EST MOD 30 MIN: CPT | Mod: S$PBB,,, | Performed by: NURSE PRACTITIONER

## 2022-10-18 PROCEDURE — 99214 PR OFFICE/OUTPT VISIT, EST, LEVL IV, 30-39 MIN: ICD-10-PCS | Mod: S$PBB,,, | Performed by: NURSE PRACTITIONER

## 2022-10-18 PROCEDURE — 93010 EKG 12-LEAD: ICD-10-PCS | Mod: S$PBB,,, | Performed by: INTERNAL MEDICINE

## 2022-10-18 PROCEDURE — 99213 OFFICE O/P EST LOW 20 MIN: CPT | Mod: PBBFAC | Performed by: NURSE PRACTITIONER

## 2022-10-18 NOTE — PROGRESS NOTES
[facility] note        DATE OF SERVICE: 10/18/2022       PCP: Noe Haile MD      CHIEF COMPLAINT:   Chief Complaint   Patient presents with    Follow-up     Patient denies any cardiac complaints today.        HISTORY OF PRESENT ILLNESS:  Tonya Sandoval is a 82 y.o. female with a PMH of   Past Medical History:   Diagnosis Date    Atrial fibrillation     GERD (gastroesophageal reflux disease)     Hyperlipidemia     Hypertension     OA (osteoarthritis)      who presents for routine follow up.  Chief Complaint   Patient presents with    Follow-up     Patient denies any cardiac complaints today.            PAST MEDICAL HISTORY:  Past Medical History:   Diagnosis Date    Atrial fibrillation     GERD (gastroesophageal reflux disease)     Hyperlipidemia     Hypertension     OA (osteoarthritis)        PAST SURGICAL HISTORY:  Past Surgical History:   Procedure Laterality Date    CARDIAC CATHETERIZATION      HYSTERECTOMY         SOCIAL HISTORY:  Social History     Socioeconomic History    Marital status: Single   Tobacco Use    Smoking status: Never    Smokeless tobacco: Never   Substance and Sexual Activity    Alcohol use: Never    Drug use: Never       FAMILY HISTORY:  Family History   Problem Relation Age of Onset    Brain cancer Father     Cancer Sister     Heart disease Brother          ALLERGIES:  Review of patient's allergies indicates:   Allergen Reactions    Codeine     Nickel sutures [surgical stainless steel]         MEDICATIONS:    Current Outpatient Medications:     ALPRAZolam (XANAX) 0.25 MG tablet, Take 1 tablet (0.25 mg total) by mouth 3 (three) times daily., Disp: 30 tablet, Rfl: 2    aspirin 325 MG tablet, Take 81 mg by mouth once daily., Disp: , Rfl:     atorvastatin (LIPITOR) 20 MG tablet, Take 1 tablet (20 mg total) by mouth once daily., Disp: 90 tablet, Rfl: 3    lansoprazole (PREVACID) 30 MG capsule, Take 1 capsule (30 mg total) by mouth once daily., Disp: 90 capsule, Rfl: 3     "metoprolol succinate (TOPROL-XL) 25 MG 24 hr tablet, Take 1 tablet (25 mg total) by mouth once daily., Disp: 90 tablet, Rfl: 3    olmesartan-hydrochlorothiazide (BENICAR HCT) 40-25 mg per tablet, Take 1 tablet by mouth once daily., Disp: 90 tablet, Rfl: 3  Medications have been reviewed but not reconciled. She did not bring her meds today.    PHYSICAL EXAM:  /82 (BP Location: Left arm, Patient Position: Sitting)   Pulse 67   Ht 5' 9" (1.753 m)   Wt 94.2 kg (207 lb 9.6 oz)   LMP  (LMP Unknown)   SpO2 97%   BMI 30.66 kg/m²   Wt Readings from Last 3 Encounters:   10/18/22 94.2 kg (207 lb 9.6 oz)   08/23/22 92.1 kg (203 lb)   02/23/22 93.4 kg (205 lb 12.8 oz)      Body mass index is 30.66 kg/m².    Physical Exam  Vitals and nursing note reviewed.   Constitutional:       Appearance: Normal appearance. She is obese.   HENT:      Head: Normocephalic and atraumatic.   Eyes:      Pupils: Pupils are equal, round, and reactive to light.   Neck:      Vascular: No carotid bruit.   Cardiovascular:      Rate and Rhythm: Bradycardia present. Rhythm irregular.      Pulses: Normal pulses.      Heart sounds: Normal heart sounds.   Pulmonary:      Effort: Pulmonary effort is normal.      Breath sounds: Normal breath sounds.   Abdominal:      General: Bowel sounds are normal.      Palpations: Abdomen is soft.   Musculoskeletal:      Cervical back: Neck supple.      Right lower leg: No edema.      Left lower leg: No edema.   Skin:     General: Skin is warm and dry.      Capillary Refill: Capillary refill takes less than 2 seconds.   Neurological:      General: No focal deficit present.      Mental Status: She is alert and oriented to person, place, and time.   Psychiatric:         Mood and Affect: Mood normal.       LABS REVIEWED:  Lab Results   Component Value Date    WBC 9.02 08/23/2022    RBC 4.66 08/23/2022    HGB 13.8 08/23/2022    HCT 42.5 08/23/2022    MCV 91.2 08/23/2022    MCH 29.6 08/23/2022    MCHC 32.5 08/23/2022 "    RDW 12.9 08/23/2022     08/23/2022    MPV 11.0 08/23/2022    NRBC 0.0 08/23/2022     Lab Results   Component Value Date     08/23/2022    K 4.2 08/23/2022     08/23/2022    CO2 31 08/23/2022    BUN 18 08/23/2022     Lab Results   Component Value Date    AST 18 08/23/2022    ALT 26 08/23/2022     Lab Results   Component Value Date     (H) 08/23/2022     Lab Results   Component Value Date    CHOL 153 08/23/2022    HDL 50 08/23/2022    TRIG 113 08/23/2022    CHOLHDL 3.1 08/23/2022       CARDIAC STUDIES REVIEWED:EKG:  10/18/2022 unchanged from previous tracings, atrial fibrillation, rate  58 bpm.            ASSESSMENT:   Patient Active Problem List   Diagnosis    Atrial fibrillation    Hyperlipidemia    Hypertension            Problem List Items Addressed This Visit          Cardiac/Vascular    Atrial fibrillation - Primary    Overview     Patient had gyno bleeding while on Xarelto and has declined to try other anticoagulants.          Relevant Orders    EKG 12-lead        PLAN:  Orders Placed This Encounter   Procedures    EKG 12-lead     Standing Status:   Future     Standing Expiration Date:   10/18/2023      RTC  1 year

## 2023-01-04 ENCOUNTER — APPOINTMENT (OUTPATIENT)
Dept: RADIOLOGY | Facility: CLINIC | Age: 83
End: 2023-01-04
Attending: FAMILY MEDICINE
Payer: MEDICARE

## 2023-01-04 ENCOUNTER — OFFICE VISIT (OUTPATIENT)
Dept: FAMILY MEDICINE | Facility: CLINIC | Age: 83
End: 2023-01-04
Payer: MEDICARE

## 2023-01-04 VITALS
DIASTOLIC BLOOD PRESSURE: 72 MMHG | HEIGHT: 69 IN | WEIGHT: 200 LBS | OXYGEN SATURATION: 94 % | BODY MASS INDEX: 29.62 KG/M2 | TEMPERATURE: 99 F | HEART RATE: 90 BPM | SYSTOLIC BLOOD PRESSURE: 118 MMHG | RESPIRATION RATE: 16 BRPM

## 2023-01-04 DIAGNOSIS — J18.9 PNEUMONIA OF LEFT UPPER LOBE DUE TO INFECTIOUS ORGANISM: Primary | ICD-10-CM

## 2023-01-04 DIAGNOSIS — R05.9 COUGH, UNSPECIFIED TYPE: ICD-10-CM

## 2023-01-04 DIAGNOSIS — J06.9 UPPER RESPIRATORY TRACT INFECTION, UNSPECIFIED TYPE: ICD-10-CM

## 2023-01-04 LAB
CTP QC/QA: YES
FLUAV AG NPH QL: NEGATIVE
FLUBV AG NPH QL: NEGATIVE
SARS-COV-2 AG RESP QL IA.RAPID: NEGATIVE

## 2023-01-04 PROCEDURE — 71046 X-RAY EXAM CHEST 2 VIEWS: CPT | Mod: 26,,, | Performed by: RADIOLOGY

## 2023-01-04 PROCEDURE — 96372 THER/PROPH/DIAG INJ SC/IM: CPT | Mod: ,,, | Performed by: FAMILY MEDICINE

## 2023-01-04 PROCEDURE — 87428 SARSCOV & INF VIR A&B AG IA: CPT | Mod: RHCUB | Performed by: FAMILY MEDICINE

## 2023-01-04 PROCEDURE — 99214 OFFICE O/P EST MOD 30 MIN: CPT | Mod: ,,, | Performed by: FAMILY MEDICINE

## 2023-01-04 PROCEDURE — 99214 PR OFFICE/OUTPT VISIT, EST, LEVL IV, 30-39 MIN: ICD-10-PCS | Mod: ,,, | Performed by: FAMILY MEDICINE

## 2023-01-04 PROCEDURE — 71046 XR CHEST PA AND LATERAL: ICD-10-PCS | Mod: 26,,, | Performed by: RADIOLOGY

## 2023-01-04 PROCEDURE — 96372 PR INJECTION,THERAP/PROPH/DIAG2ST, IM OR SUBCUT: ICD-10-PCS | Mod: ,,, | Performed by: FAMILY MEDICINE

## 2023-01-04 PROCEDURE — 71046 X-RAY EXAM CHEST 2 VIEWS: CPT | Mod: TC,RHCUB | Performed by: FAMILY MEDICINE

## 2023-01-04 RX ORDER — HYDROCODONE BITARTRATE AND HOMATROPINE METHYLBROMIDE ORAL SOLUTION 5; 1.5 MG/5ML; MG/5ML
5 LIQUID ORAL EVERY 4 HOURS PRN
Qty: 120 ML | Refills: 0 | Status: SHIPPED | OUTPATIENT
Start: 2023-01-04 | End: 2024-01-01 | Stop reason: CLARIF

## 2023-01-04 RX ORDER — LEVOFLOXACIN 500 MG/1
500 TABLET, FILM COATED ORAL DAILY
Qty: 10 TABLET | Refills: 0 | Status: SHIPPED | OUTPATIENT
Start: 2023-01-04 | End: 2024-01-01 | Stop reason: CLARIF

## 2023-01-04 RX ORDER — CEFTRIAXONE 1 G/1
1 INJECTION, POWDER, FOR SOLUTION INTRAMUSCULAR; INTRAVENOUS
Status: COMPLETED | OUTPATIENT
Start: 2023-01-04 | End: 2023-01-04

## 2023-01-04 RX ORDER — DEXAMETHASONE SODIUM PHOSPHATE 4 MG/ML
8 INJECTION, SOLUTION INTRA-ARTICULAR; INTRALESIONAL; INTRAMUSCULAR; INTRAVENOUS; SOFT TISSUE
Status: COMPLETED | OUTPATIENT
Start: 2023-01-04 | End: 2023-01-04

## 2023-01-04 RX ADMIN — CEFTRIAXONE 1 G: 1 INJECTION, POWDER, FOR SOLUTION INTRAMUSCULAR; INTRAVENOUS at 01:01

## 2023-01-04 RX ADMIN — DEXAMETHASONE SODIUM PHOSPHATE 8 MG: 4 INJECTION, SOLUTION INTRA-ARTICULAR; INTRALESIONAL; INTRAMUSCULAR; INTRAVENOUS; SOFT TISSUE at 01:01

## 2023-01-04 NOTE — PROGRESS NOTES
Noe Haile MD        PATIENT NAME: Tonya Sandoval  : 1940  DATE: 23  MRN: [id number]      Billing Provider: Noe Haile MD  Level of Service: [place] OFFICE/OUTPT VISIT, EST, LEVL IV, 30-39 MIN  Patient PCP Information       Provider PCP Type    Noe Haile MD General            Reason for Visit / Chief Complaint: URI (Started feeling bad )       Update PCP  Update Chief Complaint         History of Present Illness / Problem Focused Workflow     Tonya Sandoval presents to the clinic with URI (Started feeling bad )     URI symptoms with cough with Tmax 101 yesterday.      URI   Associated symptoms include congestion and coughing. Pertinent negatives include no abdominal pain, chest pain, diarrhea, headaches, nausea, neck pain, rash, rhinorrhea, sinus pain, sore throat or wheezing.   Review of Systems     Review of Systems   Constitutional:  Positive for fatigue. Negative for activity change, appetite change, fever and unexpected weight change.   HENT:  Positive for congestion. Negative for rhinorrhea, sinus pressure, sinus pain, sore throat and trouble swallowing.    Eyes:  Negative for photophobia, pain, discharge and visual disturbance.   Respiratory:  Positive for cough. Negative for chest tightness, wheezing and stridor.    Cardiovascular:  Negative for chest pain, palpitations and leg swelling.   Gastrointestinal:  Negative for abdominal pain, blood in stool, constipation, diarrhea and nausea.   Endocrine: Negative for polydipsia, polyphagia and polyuria.   Genitourinary:  Negative for difficulty urinating, flank pain and hematuria.   Musculoskeletal:  Negative for arthralgias and neck pain.   Skin:  Negative for rash.   Allergic/Immunologic: Negative for food allergies.   Neurological:  Negative for dizziness, tremors, seizures, syncope, weakness (global weakness) and headaches.   Psychiatric/Behavioral:  Negative for behavioral problems, confusion, decreased  concentration, dysphoric mood and hallucinations. The patient is not nervous/anxious.       Medical / Social / Family History     Past Medical History:   Diagnosis Date    Atrial fibrillation     GERD (gastroesophageal reflux disease)     Hyperlipidemia     Hypertension     OA (osteoarthritis)        Past Surgical History:   Procedure Laterality Date    CARDIAC CATHETERIZATION      HYSTERECTOMY         Social History  Ms.  reports that she has never smoked. She has never used smokeless tobacco. She reports that she does not drink alcohol and does not use drugs.    Family History  Ms.'s family history includes Brain cancer in her father; Cancer in her sister; Heart disease in her brother.    Medications and Allergies     Medications  No outpatient medications have been marked as taking for the 1/4/23 encounter (Office Visit) with Noe Haile MD.       Allergies  Review of patient's allergies indicates:   Allergen Reactions    Codeine     Nickel sutures [surgical stainless steel]        Physical Examination     Vitals:    01/04/23 1005   BP: 118/72   Pulse: 90   Resp: 16   Temp: 99 °F (37.2 °C)     Physical Exam  Constitutional:       General: She is not in acute distress.     Appearance: Normal appearance. She is ill-appearing.   HENT:      Head: Normocephalic.      Right Ear: Tympanic membrane and ear canal normal.      Left Ear: Tympanic membrane and ear canal normal.      Nose: Congestion present.      Mouth/Throat:      Mouth: Mucous membranes are moist.      Pharynx: No oropharyngeal exudate.   Eyes:      Extraocular Movements: Extraocular movements intact.      Pupils: Pupils are equal, round, and reactive to light.   Cardiovascular:      Rate and Rhythm: Normal rate and regular rhythm.      Heart sounds: No murmur heard.  Pulmonary:      Effort: Pulmonary effort is normal.      Breath sounds: Rales present. No wheezing.   Abdominal:      General: Abdomen is flat. Bowel sounds are normal.      Palpations:  Abdomen is soft.      Hernia: No hernia is present.   Musculoskeletal:         General: Normal range of motion.      Cervical back: Normal range of motion and neck supple.      Right lower leg: No edema.      Left lower leg: No edema.   Lymphadenopathy:      Cervical: No cervical adenopathy.   Skin:     General: Skin is warm and dry.      Coloration: Skin is not jaundiced.      Findings: No lesion.   Neurological:      General: No focal deficit present.      Mental Status: She is alert and oriented to person, place, and time.      Cranial Nerves: No cranial nerve deficit.      Gait: Gait normal.   Psychiatric:         Mood and Affect: Mood normal.         Behavior: Behavior normal.         Judgment: Judgment normal.        Assessment and Plan (including Health Maintenance)      Problem List  Smart Sets  Document Outside HM   :    Plan:   Pneumonia of left upper lobe due to infectious organism  -     cefTRIAXone injection 1 g  -     dexAMETHasone injection 8 mg  -     levoFLOXacin (LEVAQUIN) 500 MG tablet; Take 1 tablet (500 mg total) by mouth once daily.  Dispense: 10 tablet; Refill: 0  -     hydrocodone-homatropine 5-1.5 mg/5 ml (HYCODAN) 5-1.5 mg/5 mL Syrp; Take 5 mLs by mouth every 4 (four) hours as needed.  Dispense: 120 mL; Refill: 0    Upper respiratory tract infection, unspecified type  -     POCT SARS-COV2 (COVID) with Flu Antigen    Cough, unspecified type  -     X-Ray Chest PA And Lateral; Future; Expected date: 01/04/2023           Health Maintenance Due   Topic Date Due    COVID-19 Vaccine (1) Never done    Pneumococcal Vaccines (Age 65+) (1 - PCV) Never done    TETANUS VACCINE  Never done    DEXA Scan  Never done    Shingles Vaccine (1 of 2) Never done    Influenza Vaccine (1) Never done       Problem List Items Addressed This Visit    None  Visit Diagnoses       Pneumonia of left upper lobe due to infectious organism    -  Primary    Relevant Medications    cefTRIAXone injection 1 g (Completed)     dexAMETHasone injection 8 mg (Completed)    levoFLOXacin (LEVAQUIN) 500 MG tablet    hydrocodone-homatropine 5-1.5 mg/5 ml (HYCODAN) 5-1.5 mg/5 mL Syrp    Upper respiratory tract infection, unspecified type        Relevant Orders    POCT SARS-COV2 (COVID) with Flu Antigen (Completed)    Cough, unspecified type        Relevant Orders    X-Ray Chest PA And Lateral (Completed)            Health Maintenance Topics with due status: Not Due       Topic Last Completion Date    Lipid Panel 08/23/2022       Future Appointments   Date Time Provider Department Center   2/24/2023  9:00 AM Noe Haile MD [facility]        There are no Patient Instructions on file for this visit.  Follow up in about 3 days (around 1/7/2023), or if symptoms worsen or fail to improve.     Signature:  Noe Haile MD      Date of encounter: 1/4/23

## 2023-02-24 ENCOUNTER — OFFICE VISIT (OUTPATIENT)
Dept: FAMILY MEDICINE | Facility: CLINIC | Age: 83
End: 2023-02-24
Payer: MEDICARE

## 2023-02-24 VITALS
DIASTOLIC BLOOD PRESSURE: 84 MMHG | SYSTOLIC BLOOD PRESSURE: 136 MMHG | HEIGHT: 69 IN | WEIGHT: 200 LBS | RESPIRATION RATE: 18 BRPM | HEART RATE: 76 BPM | BODY MASS INDEX: 29.62 KG/M2 | OXYGEN SATURATION: 97 %

## 2023-02-24 DIAGNOSIS — I10 PRIMARY HYPERTENSION: Primary | ICD-10-CM

## 2023-02-24 DIAGNOSIS — E78.5 HYPERLIPIDEMIA, UNSPECIFIED HYPERLIPIDEMIA TYPE: ICD-10-CM

## 2023-02-24 LAB
ALBUMIN SERPL BCP-MCNC: 3.5 G/DL (ref 3.5–5)
ALBUMIN/GLOB SERPL: 0.8 {RATIO}
ALP SERPL-CCNC: 95 U/L (ref 55–142)
ALT SERPL W P-5'-P-CCNC: 20 U/L (ref 13–56)
ANION GAP SERPL CALCULATED.3IONS-SCNC: 9 MMOL/L (ref 7–16)
AST SERPL W P-5'-P-CCNC: 24 U/L (ref 15–37)
BASOPHILS # BLD AUTO: 0.07 K/UL (ref 0–0.2)
BASOPHILS NFR BLD AUTO: 0.9 % (ref 0–1)
BILIRUB SERPL-MCNC: 0.5 MG/DL (ref ?–1.2)
BUN SERPL-MCNC: 20 MG/DL (ref 7–18)
BUN/CREAT SERPL: 25 (ref 6–20)
CALCIUM SERPL-MCNC: 9.3 MG/DL (ref 8.5–10.1)
CHLORIDE SERPL-SCNC: 104 MMOL/L (ref 98–107)
CHOLEST SERPL-MCNC: 145 MG/DL (ref 0–200)
CHOLEST/HDLC SERPL: 3.2 {RATIO}
CO2 SERPL-SCNC: 29 MMOL/L (ref 21–32)
CREAT SERPL-MCNC: 0.81 MG/DL (ref 0.55–1.02)
DIFFERENTIAL METHOD BLD: ABNORMAL
EGFR (NO RACE VARIABLE) (RUSH/TITUS): 73 ML/MIN/1.73M²
EOSINOPHIL # BLD AUTO: 0.43 K/UL (ref 0–0.5)
EOSINOPHIL NFR BLD AUTO: 5.6 % (ref 1–4)
ERYTHROCYTE [DISTWIDTH] IN BLOOD BY AUTOMATED COUNT: 13.2 % (ref 11.5–14.5)
GLOBULIN SER-MCNC: 4.4 G/DL (ref 2–4)
GLUCOSE SERPL-MCNC: 107 MG/DL (ref 74–106)
HCT VFR BLD AUTO: 42.7 % (ref 38–47)
HDLC SERPL-MCNC: 45 MG/DL (ref 40–60)
HGB BLD-MCNC: 13.5 G/DL (ref 12–16)
IMM GRANULOCYTES # BLD AUTO: 0.02 K/UL (ref 0–0.04)
IMM GRANULOCYTES NFR BLD: 0.3 % (ref 0–0.4)
LDLC SERPL CALC-MCNC: 72 MG/DL
LDLC/HDLC SERPL: 1.6 {RATIO}
LYMPHOCYTES # BLD AUTO: 1.86 K/UL (ref 1–4.8)
LYMPHOCYTES NFR BLD AUTO: 24.3 % (ref 27–41)
MCH RBC QN AUTO: 28.6 PG (ref 27–31)
MCHC RBC AUTO-ENTMCNC: 31.6 G/DL (ref 32–36)
MCV RBC AUTO: 90.5 FL (ref 80–96)
MONOCYTES # BLD AUTO: 0.64 K/UL (ref 0–0.8)
MONOCYTES NFR BLD AUTO: 8.4 % (ref 2–6)
MPC BLD CALC-MCNC: 11 FL (ref 9.4–12.4)
NEUTROPHILS # BLD AUTO: 4.64 K/UL (ref 1.8–7.7)
NEUTROPHILS NFR BLD AUTO: 60.5 % (ref 53–65)
NONHDLC SERPL-MCNC: 100 MG/DL
NRBC # BLD AUTO: 0 X10E3/UL
NRBC, AUTO (.00): 0 %
PLATELET # BLD AUTO: 326 K/UL (ref 150–400)
POTASSIUM SERPL-SCNC: 3.7 MMOL/L (ref 3.5–5.1)
PROT SERPL-MCNC: 7.9 G/DL (ref 6.4–8.2)
RBC # BLD AUTO: 4.72 M/UL (ref 4.2–5.4)
SODIUM SERPL-SCNC: 138 MMOL/L (ref 136–145)
TRIGL SERPL-MCNC: 140 MG/DL (ref 35–150)
VLDLC SERPL-MCNC: 28 MG/DL
WBC # BLD AUTO: 7.66 K/UL (ref 4.5–11)

## 2023-02-24 PROCEDURE — 85025 CBC WITH DIFFERENTIAL: ICD-10-PCS | Mod: ,,, | Performed by: CLINICAL MEDICAL LABORATORY

## 2023-02-24 PROCEDURE — 80053 COMPREHEN METABOLIC PANEL: CPT | Mod: ,,, | Performed by: CLINICAL MEDICAL LABORATORY

## 2023-02-24 PROCEDURE — 80061 LIPID PANEL: CPT | Mod: ,,, | Performed by: CLINICAL MEDICAL LABORATORY

## 2023-02-24 PROCEDURE — 85025 COMPLETE CBC W/AUTO DIFF WBC: CPT | Mod: ,,, | Performed by: CLINICAL MEDICAL LABORATORY

## 2023-02-24 PROCEDURE — 80061 LIPID PANEL: ICD-10-PCS | Mod: ,,, | Performed by: CLINICAL MEDICAL LABORATORY

## 2023-02-24 PROCEDURE — 80053 COMPREHENSIVE METABOLIC PANEL: ICD-10-PCS | Mod: ,,, | Performed by: CLINICAL MEDICAL LABORATORY

## 2023-02-24 PROCEDURE — 99214 PR OFFICE/OUTPT VISIT, EST, LEVL IV, 30-39 MIN: ICD-10-PCS | Mod: ,,, | Performed by: FAMILY MEDICINE

## 2023-02-24 PROCEDURE — 99214 OFFICE O/P EST MOD 30 MIN: CPT | Mod: ,,, | Performed by: FAMILY MEDICINE

## 2023-02-24 NOTE — PROGRESS NOTES
Noe Haile MD        PATIENT NAME: Tonya Sandoval  : 1940  DATE: 23  MRN: [id number]      Billing Provider: Noe Haile MD  Level of Service: [place] OFFICE/OUTPT VISIT, EST, LEVL IV, 30-39 MIN  Patient PCP Information       Provider PCP Type    Noe Haile MD General            Reason for Visit / Chief Complaint: Hypertension (6 month check up)       Update PCP  Update Chief Complaint         History of Present Illness / Problem Focused Workflow     Tonya Sandoval presents to the clinic with Hypertension (6 month check up)     Routine followup.  No significant interval change  .      Hypertension  Pertinent negatives include no chest pain, headaches, neck pain or palpitations.   Review of Systems     Review of Systems   Constitutional:  Negative for activity change, appetite change, fever and unexpected weight change.   HENT:  Negative for congestion, rhinorrhea, sinus pressure, sinus pain, sore throat and trouble swallowing.    Eyes:  Negative for photophobia, pain, discharge and visual disturbance.   Respiratory:  Negative for cough, chest tightness, wheezing and stridor.    Cardiovascular:  Negative for chest pain, palpitations and leg swelling.   Gastrointestinal:  Negative for abdominal pain, blood in stool, constipation, diarrhea and nausea.   Endocrine: Negative for polydipsia, polyphagia and polyuria.   Genitourinary:  Negative for difficulty urinating, flank pain and hematuria.   Musculoskeletal:  Negative for arthralgias and neck pain.   Skin:  Negative for rash.   Allergic/Immunologic: Negative for food allergies.   Neurological:  Negative for dizziness, tremors, seizures, syncope, weakness (global weakness) and headaches.   Psychiatric/Behavioral:  Negative for behavioral problems, confusion, decreased concentration, dysphoric mood and hallucinations. The patient is not nervous/anxious.       Medical / Social / Family History     Past Medical History:   Diagnosis  Date    Atrial fibrillation     GERD (gastroesophageal reflux disease)     Hyperlipidemia     Hypertension     OA (osteoarthritis)        Past Surgical History:   Procedure Laterality Date    CARDIAC CATHETERIZATION      HYSTERECTOMY         Social History  Ms.  reports that she has never smoked. She has never been exposed to tobacco smoke. She has never used smokeless tobacco. She reports that she does not drink alcohol and does not use drugs.    Family History  Ms.'s family history includes Brain cancer in her father; Cancer in her sister; Heart disease in her brother.    Medications and Allergies     Medications  No outpatient medications have been marked as taking for the 2/24/23 encounter (Office Visit) with Noe Haile MD.       Allergies  Review of patient's allergies indicates:   Allergen Reactions    Codeine     Nickel sutures [surgical stainless steel]        Physical Examination     Vitals:    02/24/23 0843   BP: 136/84   Pulse: 76   Resp: 18     Physical Exam  Constitutional:       General: She is not in acute distress.     Appearance: Normal appearance.   HENT:      Head: Normocephalic.      Right Ear: Tympanic membrane and ear canal normal.      Left Ear: Tympanic membrane and ear canal normal.      Nose: Nose normal.      Mouth/Throat:      Mouth: Mucous membranes are moist.      Pharynx: No oropharyngeal exudate.   Eyes:      Extraocular Movements: Extraocular movements intact.      Pupils: Pupils are equal, round, and reactive to light.   Cardiovascular:      Rate and Rhythm: Normal rate and regular rhythm.      Heart sounds: No murmur heard.  Pulmonary:      Effort: Pulmonary effort is normal.      Breath sounds: Normal breath sounds. No wheezing.   Abdominal:      General: Abdomen is flat. Bowel sounds are normal.      Palpations: Abdomen is soft.      Hernia: No hernia is present.   Musculoskeletal:         General: Normal range of motion.      Cervical back: Normal range of motion and neck  supple.      Right lower leg: No edema.      Left lower leg: No edema.   Lymphadenopathy:      Cervical: No cervical adenopathy.   Skin:     General: Skin is warm and dry.      Coloration: Skin is not jaundiced.      Findings: No lesion.   Neurological:      General: No focal deficit present.      Mental Status: She is alert and oriented to person, place, and time.      Cranial Nerves: No cranial nerve deficit.      Gait: Gait normal.   Psychiatric:         Mood and Affect: Mood normal.         Behavior: Behavior normal.         Judgment: Judgment normal.        Assessment and Plan (including Health Maintenance)      Problem List  Smart Sets  Document Outside HM   :    Plan:     1. Primary hypertension  The current medical regimen is effective;  continue present plan and medications.  -     CBC Auto Differential  -     Comprehensive Metabolic Panel  -     Lipid Panel          Health Maintenance Due   Topic Date Due    TETANUS VACCINE  Never done    DEXA Scan  Never done    Shingles Vaccine (1 of 2) Never done    Pneumococcal Vaccines (Age 65+) (2 - PPSV23 if available, else PCV20) 04/04/2017       Problem List Items Addressed This Visit          Cardiac/Vascular    Hyperlipidemia    Hypertension - Primary    Relevant Orders    CBC Auto Differential (Completed)    Comprehensive Metabolic Panel (Completed)    Lipid Panel (Completed)       Health Maintenance Topics with due status: Not Due       Topic Last Completion Date    Lipid Panel 02/24/2023       Future Appointments   Date Time Provider Department Center   8/23/2023  9:00 AM Noe Haile MD [facility]        There are no Patient Instructions on file for this visit.  Follow up in about 6 months (around 8/24/2023) for routine followup.     Signature:  Noe Haile MD      Date of encounter: 2/24/23

## 2023-03-28 ENCOUNTER — OFFICE VISIT (OUTPATIENT)
Dept: FAMILY MEDICINE | Facility: CLINIC | Age: 83
End: 2023-03-28
Payer: MEDICARE

## 2023-03-28 VITALS
HEIGHT: 69 IN | SYSTOLIC BLOOD PRESSURE: 136 MMHG | BODY MASS INDEX: 29.77 KG/M2 | OXYGEN SATURATION: 97 % | RESPIRATION RATE: 20 BRPM | WEIGHT: 201 LBS | HEART RATE: 84 BPM | DIASTOLIC BLOOD PRESSURE: 76 MMHG

## 2023-03-28 DIAGNOSIS — J22 LOWER RESPIRATORY INFECTION: Primary | ICD-10-CM

## 2023-03-28 PROCEDURE — 96372 THER/PROPH/DIAG INJ SC/IM: CPT | Mod: ,,, | Performed by: FAMILY MEDICINE

## 2023-03-28 PROCEDURE — 99213 PR OFFICE/OUTPT VISIT, EST, LEVL III, 20-29 MIN: ICD-10-PCS | Mod: ,,, | Performed by: FAMILY MEDICINE

## 2023-03-28 PROCEDURE — 99213 OFFICE O/P EST LOW 20 MIN: CPT | Mod: ,,, | Performed by: FAMILY MEDICINE

## 2023-03-28 PROCEDURE — 96372 PR INJECTION,THERAP/PROPH/DIAG2ST, IM OR SUBCUT: ICD-10-PCS | Mod: ,,, | Performed by: FAMILY MEDICINE

## 2023-03-28 RX ORDER — PREDNISONE 20 MG/1
20 TABLET ORAL 2 TIMES DAILY
Qty: 10 TABLET | Refills: 0 | Status: SHIPPED | OUTPATIENT
Start: 2023-03-28 | End: 2024-01-01 | Stop reason: CLARIF

## 2023-03-28 RX ORDER — METHYLPREDNISOLONE ACETATE 80 MG/ML
80 INJECTION, SUSPENSION INTRA-ARTICULAR; INTRALESIONAL; INTRAMUSCULAR; SOFT TISSUE
Status: COMPLETED | OUTPATIENT
Start: 2023-03-28 | End: 2023-03-28

## 2023-03-28 RX ORDER — ALBUTEROL SULFATE 90 UG/1
2 AEROSOL, METERED RESPIRATORY (INHALATION) EVERY 6 HOURS PRN
Qty: 18 G | Refills: 11 | Status: SHIPPED | OUTPATIENT
Start: 2023-03-28 | End: 2024-01-01 | Stop reason: CLARIF

## 2023-03-28 RX ORDER — AZITHROMYCIN 250 MG/1
TABLET, FILM COATED ORAL
Qty: 6 TABLET | Refills: 0 | Status: SHIPPED | OUTPATIENT
Start: 2023-03-28 | End: 2024-01-01 | Stop reason: CLARIF

## 2023-03-28 RX ADMIN — METHYLPREDNISOLONE ACETATE 80 MG: 80 INJECTION, SUSPENSION INTRA-ARTICULAR; INTRALESIONAL; INTRAMUSCULAR; SOFT TISSUE at 01:03

## 2023-03-28 NOTE — PROGRESS NOTES
Noe Haile MD        PATIENT NAME: Tonya Sandoval  : 1940  DATE: 3/28/23  MRN: [id number]      Billing Provider: Noe Haile MD  Level of Service: [place] OFFICE/OUTPT VISIT, EST, LEVL III, 20-29 MIN  Patient PCP Information       Provider PCP Type    Noe Haiel MD General            Reason for Visit / Chief Complaint: Cough (Cough and congestion will not go away)       Update PCP  Update Chief Complaint         History of Present Illness / Problem Focused Workflow     Tonya Sandoval presents to the clinic with Cough (Cough and congestion will not go away)     Five days of cough    Cough  Pertinent negatives include no chest pain, fever, headaches, rash, rhinorrhea, sore throat or wheezing.   Review of Systems     Review of Systems   Constitutional:  Negative for activity change, appetite change, fever and unexpected weight change.   HENT:  Positive for congestion. Negative for rhinorrhea, sinus pressure, sinus pain, sore throat and trouble swallowing.    Eyes:  Negative for photophobia, pain, discharge and visual disturbance.   Respiratory:  Positive for cough. Negative for chest tightness, wheezing and stridor.    Cardiovascular:  Negative for chest pain, palpitations and leg swelling.   Gastrointestinal:  Negative for abdominal pain, blood in stool, constipation, diarrhea and nausea.   Endocrine: Negative for polydipsia, polyphagia and polyuria.   Genitourinary:  Negative for difficulty urinating, flank pain and hematuria.   Musculoskeletal:  Negative for arthralgias and neck pain.   Skin:  Negative for rash.   Allergic/Immunologic: Negative for food allergies.   Neurological:  Negative for dizziness, tremors, seizures, syncope, weakness (global weakness) and headaches.   Psychiatric/Behavioral:  Negative for behavioral problems, confusion, decreased concentration, dysphoric mood and hallucinations. The patient is not nervous/anxious.       Medical / Social / Family History      Past Medical History:   Diagnosis Date    Atrial fibrillation     GERD (gastroesophageal reflux disease)     Hyperlipidemia     Hypertension     OA (osteoarthritis)        Past Surgical History:   Procedure Laterality Date    CARDIAC CATHETERIZATION      HYSTERECTOMY         Social History  Ms.  reports that she has never smoked. She has never been exposed to tobacco smoke. She has never used smokeless tobacco. She reports that she does not drink alcohol and does not use drugs.    Family History  Ms.'s family history includes Brain cancer in her father; Cancer in her sister; Heart disease in her brother.    Medications and Allergies     Medications  No outpatient medications have been marked as taking for the 3/28/23 encounter (Office Visit) with Noe Haile MD.       Allergies  Review of patient's allergies indicates:   Allergen Reactions    Codeine     Nickel sutures [surgical stainless steel]        Physical Examination     Vitals:    03/28/23 1317   BP: 136/76   Pulse: 84   Resp: 20     Physical Exam  Constitutional:       General: She is not in acute distress.     Appearance: Normal appearance.   HENT:      Head: Normocephalic.      Right Ear: Tympanic membrane and ear canal normal.      Left Ear: Tympanic membrane and ear canal normal.      Nose: Nose normal.      Mouth/Throat:      Mouth: Mucous membranes are moist.      Pharynx: No oropharyngeal exudate.   Eyes:      Extraocular Movements: Extraocular movements intact.      Pupils: Pupils are equal, round, and reactive to light.   Cardiovascular:      Rate and Rhythm: Normal rate and regular rhythm.      Heart sounds: No murmur heard.  Pulmonary:      Effort: Pulmonary effort is normal.      Breath sounds: Normal breath sounds. No wheezing.   Abdominal:      General: Abdomen is flat. Bowel sounds are normal.      Palpations: Abdomen is soft.      Hernia: No hernia is present.   Musculoskeletal:         General: Normal range of motion.      Cervical  back: Normal range of motion and neck supple.      Right lower leg: No edema.      Left lower leg: No edema.   Lymphadenopathy:      Cervical: No cervical adenopathy.   Skin:     General: Skin is warm and dry.      Coloration: Skin is not jaundiced.      Findings: No lesion.   Neurological:      General: No focal deficit present.      Mental Status: She is alert and oriented to person, place, and time.      Cranial Nerves: No cranial nerve deficit.      Gait: Gait normal.   Psychiatric:         Mood and Affect: Mood normal.         Behavior: Behavior normal.         Judgment: Judgment normal.        Assessment and Plan (including Health Maintenance)      Problem List  Smart Sets  Document Outside HM   :    Plan:           Health Maintenance Due   Topic Date Due    TETANUS VACCINE  Never done    DEXA Scan  Never done    Shingles Vaccine (1 of 2) Never done    Pneumococcal Vaccines (Age 65+) (2 - PPSV23 if available, else PCV20) 04/04/2017       Problem List Items Addressed This Visit    None  Visit Diagnoses       Lower respiratory infection    -  Primary    Relevant Medications    methylPREDNISolone acetate injection 80 mg (Completed)    predniSONE (DELTASONE) 20 MG tablet    azithromycin (Z-TASIA) 250 MG tablet    albuterol (VENTOLIN HFA) 90 mcg/actuation inhaler            Health Maintenance Topics with due status: Not Due       Topic Last Completion Date    Lipid Panel 02/24/2023       Future Appointments   Date Time Provider Department Center   8/23/2023  9:00 AM Noe Haile MD [facility]        There are no Patient Instructions on file for this visit.  Follow up if symptoms worsen or fail to improve.     Signature:  Noe Haile MD      Date of encounter: 3/28/23

## 2023-05-09 DIAGNOSIS — Z71.89 COMPLEX CARE COORDINATION: ICD-10-CM

## 2023-06-30 ENCOUNTER — OFFICE VISIT (OUTPATIENT)
Dept: FAMILY MEDICINE | Facility: CLINIC | Age: 83
End: 2023-06-30
Payer: MEDICARE

## 2023-06-30 VITALS
BODY MASS INDEX: 30.51 KG/M2 | RESPIRATION RATE: 20 BRPM | SYSTOLIC BLOOD PRESSURE: 146 MMHG | HEART RATE: 67 BPM | HEIGHT: 69 IN | WEIGHT: 206 LBS | OXYGEN SATURATION: 97 % | DIASTOLIC BLOOD PRESSURE: 76 MMHG

## 2023-06-30 DIAGNOSIS — M54.50 CHRONIC BILATERAL LOW BACK PAIN WITHOUT SCIATICA: Primary | ICD-10-CM

## 2023-06-30 DIAGNOSIS — G89.29 CHRONIC BILATERAL LOW BACK PAIN WITHOUT SCIATICA: Primary | ICD-10-CM

## 2023-06-30 PROCEDURE — 99213 PR OFFICE/OUTPT VISIT, EST, LEVL III, 20-29 MIN: ICD-10-PCS | Mod: ,,, | Performed by: FAMILY MEDICINE

## 2023-06-30 PROCEDURE — 99213 OFFICE O/P EST LOW 20 MIN: CPT | Mod: ,,, | Performed by: FAMILY MEDICINE

## 2023-06-30 PROCEDURE — 96372 PR INJECTION,THERAP/PROPH/DIAG2ST, IM OR SUBCUT: ICD-10-PCS | Mod: ,,, | Performed by: FAMILY MEDICINE

## 2023-06-30 PROCEDURE — 96372 THER/PROPH/DIAG INJ SC/IM: CPT | Mod: ,,, | Performed by: FAMILY MEDICINE

## 2023-06-30 RX ORDER — DEXAMETHASONE SODIUM PHOSPHATE 4 MG/ML
8 INJECTION, SOLUTION INTRA-ARTICULAR; INTRALESIONAL; INTRAMUSCULAR; INTRAVENOUS; SOFT TISSUE
Status: COMPLETED | OUTPATIENT
Start: 2023-06-30 | End: 2023-06-30

## 2023-06-30 RX ORDER — HYDROCODONE BITARTRATE AND ACETAMINOPHEN 5; 325 MG/1; MG/1
1 TABLET ORAL EVERY 6 HOURS PRN
Qty: 12 TABLET | Refills: 0 | Status: SHIPPED | OUTPATIENT
Start: 2023-06-30 | End: 2024-01-01 | Stop reason: CLARIF

## 2023-06-30 RX ADMIN — DEXAMETHASONE SODIUM PHOSPHATE 8 MG: 4 INJECTION, SOLUTION INTRA-ARTICULAR; INTRALESIONAL; INTRAMUSCULAR; INTRAVENOUS; SOFT TISSUE at 08:06

## 2023-06-30 NOTE — PROGRESS NOTES
Noe Haile MD        PATIENT NAME: Tonya Sandoval  : 1940  DATE: 23  MRN: [id number]      Billing Provider: Noe Haile MD  Level of Service: ND OFFICE/OUTPT VISIT, EST, LEVL III, 20-29 MIN  Patient PCP Information       Provider PCP Type    Noe Haile MD General            Reason for Visit / Chief Complaint: Back Pain (Patient states she has been having back pain for years but it has gotten worse recently) and Constipation (No BM x 3 days)       Update PCP  Update Chief Complaint         History of Present Illness / Problem Focused Workflow     Tonya Sandoval presents to the clinic with Back Pain (Patient states she has been having back pain for years but it has gotten worse recently) and Constipation (No BM x 3 days)     Recurrent low back pain.  Can't rest.      Back Pain  Pertinent negatives include no abdominal pain, chest pain, fever, headaches or weakness (global weakness).   Constipation  Associated symptoms include back pain. Pertinent negatives include no abdominal pain, diarrhea, difficulty urinating, fever or nausea.     Review of Systems     Review of Systems   Constitutional:  Negative for activity change, appetite change, fever and unexpected weight change.   HENT:  Negative for congestion, rhinorrhea, sinus pressure, sinus pain, sore throat and trouble swallowing.    Eyes:  Negative for photophobia, pain, discharge and visual disturbance.   Respiratory:  Negative for cough, chest tightness, wheezing and stridor.    Cardiovascular:  Negative for chest pain, palpitations and leg swelling.   Gastrointestinal:  Positive for constipation. Negative for abdominal pain, blood in stool, diarrhea and nausea.   Endocrine: Negative for polydipsia, polyphagia and polyuria.   Genitourinary:  Negative for difficulty urinating, flank pain and hematuria.   Musculoskeletal:  Positive for back pain. Negative for arthralgias and neck pain.   Skin:  Negative for rash.    Allergic/Immunologic: Negative for food allergies.   Neurological:  Negative for dizziness, tremors, seizures, syncope, weakness (global weakness) and headaches.   Psychiatric/Behavioral:  Negative for behavioral problems, confusion, decreased concentration, dysphoric mood and hallucinations. The patient is not nervous/anxious.       Medical / Social / Family History     Past Medical History:   Diagnosis Date    Atrial fibrillation     GERD (gastroesophageal reflux disease)     Hyperlipidemia     Hypertension     OA (osteoarthritis)        Past Surgical History:   Procedure Laterality Date    CARDIAC CATHETERIZATION      HYSTERECTOMY         Social History  Ms.  reports that she has never smoked. She has never been exposed to tobacco smoke. She has never used smokeless tobacco. She reports that she does not drink alcohol and does not use drugs.    Family History  Ms.'s family history includes Brain cancer in her father; Cancer in her sister; Heart disease in her brother.    Medications and Allergies     Medications  No outpatient medications have been marked as taking for the 6/30/23 encounter (Office Visit) with Noe Haile MD.     Current Facility-Administered Medications for the 6/30/23 encounter (Office Visit) with Noe Haile MD   Medication Dose Route Frequency Provider Last Rate Last Admin    [COMPLETED] dexAMETHasone injection 8 mg  8 mg Intramuscular 1 time in Clinic/HOD Noe Haile MD   8 mg at 06/30/23 0819       Allergies  Review of patient's allergies indicates:   Allergen Reactions    Codeine     Nickel sutures [surgical stainless steel]        Physical Examination     Vitals:    06/30/23 0751   BP: (!) 146/76   Pulse: 67   Resp: 20     Physical Exam  Constitutional:       General: She is not in acute distress.     Appearance: Normal appearance.   HENT:      Head: Normocephalic.      Right Ear: Tympanic membrane and ear canal normal.      Left Ear: Tympanic membrane and ear canal  normal.      Nose: Nose normal.      Mouth/Throat:      Mouth: Mucous membranes are moist.      Pharynx: No oropharyngeal exudate.   Eyes:      Extraocular Movements: Extraocular movements intact.      Pupils: Pupils are equal, round, and reactive to light.   Cardiovascular:      Rate and Rhythm: Normal rate and regular rhythm.      Heart sounds: No murmur heard.  Pulmonary:      Effort: Pulmonary effort is normal.      Breath sounds: Normal breath sounds. No wheezing.   Abdominal:      General: Abdomen is flat. Bowel sounds are normal.      Palpations: Abdomen is soft.      Hernia: No hernia is present.   Musculoskeletal:         General: Normal range of motion.      Cervical back: Normal range of motion and neck supple.      Right lower leg: No edema.      Left lower leg: No edema.   Lymphadenopathy:      Cervical: No cervical adenopathy.   Skin:     General: Skin is warm and dry.      Coloration: Skin is not jaundiced.      Findings: No lesion.   Neurological:      General: No focal deficit present.      Mental Status: She is alert and oriented to person, place, and time.      Cranial Nerves: No cranial nerve deficit.      Gait: Gait normal.   Psychiatric:         Mood and Affect: Mood normal.         Behavior: Behavior normal.         Judgment: Judgment normal.        Assessment and Plan (including Health Maintenance)      Problem List  Smart LiveIntent  Document Outside HM   :    Plan:           Health Maintenance Due   Topic Date Due    TETANUS VACCINE  Never done    DEXA Scan  Never done    Shingles Vaccine (1 of 2) Never done    Pneumococcal Vaccines (Age 65+) (2 - PPSV23 if available, else PCV20) 04/04/2017       Problem List Items Addressed This Visit    None  Visit Diagnoses       Chronic bilateral low back pain without sciatica    -  Primary    Relevant Medications    dexAMETHasone injection 8 mg (Completed)    HYDROcodone-acetaminophen (NORCO) 5-325 mg per tablet            Health Maintenance Topics with due  status: Not Due       Topic Last Completion Date    Lipid Panel 02/24/2023       Future Appointments   Date Time Provider Department Center   8/23/2023  9:00 AM Noe Haile MD [facility]        There are no Patient Instructions on file for this visit.  Follow up if symptoms worsen or fail to improve.     Signature:  Noe Haile MD      Date of encounter: 6/30/23

## 2023-08-08 DIAGNOSIS — E78.5 HYPERLIPIDEMIA, UNSPECIFIED HYPERLIPIDEMIA TYPE: ICD-10-CM

## 2023-08-08 DIAGNOSIS — I10 PRIMARY HYPERTENSION: Primary | ICD-10-CM

## 2023-08-16 ENCOUNTER — OFFICE VISIT (OUTPATIENT)
Dept: FAMILY MEDICINE | Facility: CLINIC | Age: 83
End: 2023-08-16
Payer: MEDICARE

## 2023-08-16 VITALS
BODY MASS INDEX: 30.21 KG/M2 | DIASTOLIC BLOOD PRESSURE: 88 MMHG | SYSTOLIC BLOOD PRESSURE: 138 MMHG | HEART RATE: 67 BPM | WEIGHT: 204 LBS | TEMPERATURE: 98 F | HEIGHT: 69 IN | OXYGEN SATURATION: 98 % | RESPIRATION RATE: 16 BRPM

## 2023-08-16 DIAGNOSIS — K21.9 GASTROESOPHAGEAL REFLUX DISEASE, UNSPECIFIED WHETHER ESOPHAGITIS PRESENT: ICD-10-CM

## 2023-08-16 DIAGNOSIS — E78.5 HYPERLIPIDEMIA, UNSPECIFIED HYPERLIPIDEMIA TYPE: ICD-10-CM

## 2023-08-16 DIAGNOSIS — I10 HYPERTENSION, UNSPECIFIED TYPE: ICD-10-CM

## 2023-08-16 PROCEDURE — 99214 PR OFFICE/OUTPT VISIT, EST, LEVL IV, 30-39 MIN: ICD-10-PCS | Mod: ,,, | Performed by: FAMILY MEDICINE

## 2023-08-16 PROCEDURE — 99214 OFFICE O/P EST MOD 30 MIN: CPT | Mod: ,,, | Performed by: FAMILY MEDICINE

## 2023-08-16 RX ORDER — ATORVASTATIN CALCIUM 20 MG/1
20 TABLET, FILM COATED ORAL DAILY
Qty: 90 TABLET | Refills: 3 | Status: SHIPPED | OUTPATIENT
Start: 2023-08-16

## 2023-08-16 RX ORDER — METOPROLOL SUCCINATE 25 MG/1
25 TABLET, EXTENDED RELEASE ORAL DAILY
Qty: 90 TABLET | Refills: 3 | Status: SHIPPED | OUTPATIENT
Start: 2023-08-16

## 2023-08-16 RX ORDER — OLMESARTAN MEDOXOMIL AND HYDROCHLOROTHIAZIDE 40/25 40; 25 MG/1; MG/1
1 TABLET ORAL DAILY
Qty: 90 TABLET | Refills: 3 | Status: SHIPPED | OUTPATIENT
Start: 2023-08-16

## 2023-08-16 RX ORDER — LANSOPRAZOLE 30 MG/1
30 CAPSULE, DELAYED RELEASE ORAL DAILY
Qty: 90 CAPSULE | Refills: 3 | Status: SHIPPED | OUTPATIENT
Start: 2023-08-16

## 2023-08-16 NOTE — PROGRESS NOTES
Noe Haile MD        PATIENT NAME: Tonya Sandoval  : 1940  DATE: 23  MRN: [id number]      Billing Provider: Noe Haile MD  Level of Service: OK OFFICE/OUTPT VISIT, EST, LEVL IV, 30-39 MIN  Patient PCP Information       Provider PCP Type    Noe Haile MD General            Reason for Visit / Chief Complaint: Hyperlipidemia and Hypertension (6 month check go over lab.)       Update PCP  Update Chief Complaint         History of Present Illness / Problem Focused Workflow     Tonya Sandoval presents to the clinic with Hyperlipidemia and Hypertension (6 month check go over lab.)     Routine followup.  No significant interval change      Hyperlipidemia  Pertinent negatives include no chest pain.   Hypertension  Pertinent negatives include no chest pain, headaches, neck pain or palpitations.     Review of Systems     Review of Systems   Constitutional:  Negative for activity change, appetite change, fever and unexpected weight change.   HENT:  Negative for congestion, rhinorrhea, sinus pressure, sinus pain, sore throat and trouble swallowing.    Eyes:  Negative for photophobia, pain, discharge and visual disturbance.   Respiratory:  Negative for cough, chest tightness, wheezing and stridor.    Cardiovascular:  Negative for chest pain, palpitations and leg swelling.   Gastrointestinal:  Negative for abdominal pain, blood in stool, constipation, diarrhea and nausea.   Endocrine: Negative for polydipsia, polyphagia and polyuria.   Genitourinary:  Negative for difficulty urinating, flank pain and hematuria.   Musculoskeletal:  Negative for arthralgias and neck pain.   Skin:  Negative for rash.   Allergic/Immunologic: Negative for food allergies.   Neurological:  Negative for dizziness, tremors, seizures, syncope, weakness (global weakness) and headaches.   Psychiatric/Behavioral:  Negative for behavioral problems, confusion, decreased concentration, dysphoric mood and  hallucinations. The patient is not nervous/anxious.         Medical / Social / Family History     Past Medical History:   Diagnosis Date    Atrial fibrillation     GERD (gastroesophageal reflux disease)     Hyperlipidemia     Hypertension     OA (osteoarthritis)        Past Surgical History:   Procedure Laterality Date    CARDIAC CATHETERIZATION      HYSTERECTOMY         Social History  Ms.  reports that she has never smoked. She has never been exposed to tobacco smoke. She has never used smokeless tobacco. She reports that she does not drink alcohol and does not use drugs.    Family History  Ms.'s family history includes Brain cancer in her father; Cancer in her sister; Heart disease in her brother.    Medications and Allergies     Medications  Outpatient Medications Marked as Taking for the 8/16/23 encounter (Office Visit) with Noe Haile MD   Medication Sig Dispense Refill    aspirin 325 MG tablet Take 81 mg by mouth once daily.      [DISCONTINUED] atorvastatin (LIPITOR) 20 MG tablet Take 1 tablet (20 mg total) by mouth once daily. 90 tablet 3    [DISCONTINUED] lansoprazole (PREVACID) 30 MG capsule Take 1 capsule (30 mg total) by mouth once daily. 90 capsule 3    [DISCONTINUED] metoprolol succinate (TOPROL-XL) 25 MG 24 hr tablet Take 1 tablet (25 mg total) by mouth once daily. 90 tablet 3    [DISCONTINUED] olmesartan-hydrochlorothiazide (BENICAR HCT) 40-25 mg per tablet Take 1 tablet by mouth once daily. 90 tablet 3       Allergies  Review of patient's allergies indicates:   Allergen Reactions    Codeine     Nickel sutures [surgical stainless steel]        Physical Examination     Vitals:    08/16/23 0914   BP: 138/88   Pulse: 67   Resp: 16   Temp: 98 °F (36.7 °C)     Physical Exam  Constitutional:       General: She is not in acute distress.     Appearance: Normal appearance.   HENT:      Head: Normocephalic.      Right Ear: Tympanic membrane and ear canal normal.      Left Ear: Tympanic membrane and ear  canal normal.      Nose: Nose normal.      Mouth/Throat:      Mouth: Mucous membranes are moist.      Pharynx: No oropharyngeal exudate.   Eyes:      Extraocular Movements: Extraocular movements intact.      Pupils: Pupils are equal, round, and reactive to light.   Cardiovascular:      Rate and Rhythm: Normal rate and regular rhythm.      Heart sounds: No murmur heard.  Pulmonary:      Effort: Pulmonary effort is normal.      Breath sounds: Normal breath sounds. No wheezing.   Abdominal:      General: Abdomen is flat. Bowel sounds are normal.      Palpations: Abdomen is soft.      Hernia: No hernia is present.   Musculoskeletal:         General: Normal range of motion.      Cervical back: Normal range of motion and neck supple.      Right lower leg: No edema.      Left lower leg: No edema.   Lymphadenopathy:      Cervical: No cervical adenopathy.   Skin:     General: Skin is warm and dry.      Coloration: Skin is not jaundiced.      Findings: No lesion.   Neurological:      General: No focal deficit present.      Mental Status: She is alert and oriented to person, place, and time.      Cranial Nerves: No cranial nerve deficit.      Gait: Gait normal.   Psychiatric:         Mood and Affect: Mood normal.         Behavior: Behavior normal.         Judgment: Judgment normal.          Assessment and Plan (including Health Maintenance)      Problem List  Smart Sets  Document Outside HM   :    Plan:     1. Gastroesophageal reflux disease, unspecified whether esophagitis present  The current medical regimen is effective;  continue present plan and medications.  -     lansoprazole (PREVACID) 30 MG capsule; Take 1 capsule (30 mg total) by mouth once daily.  Dispense: 90 capsule; Refill: 3    2. Hyperlipidemia, unspecified hyperlipidemia type  The current medical regimen is effective;  continue present plan and medications.  -     atorvastatin (LIPITOR) 20 MG tablet; Take 1 tablet (20 mg total) by mouth once daily.  Dispense:  90 tablet; Refill: 3    3. Hypertension, unspecified type  The current medical regimen is effective;  continue present plan and medications.  -     olmesartan-hydrochlorothiazide (BENICAR HCT) 40-25 mg per tablet; Take 1 tablet by mouth once daily.  Dispense: 90 tablet; Refill: 3  -     metoprolol succinate (TOPROL-XL) 25 MG 24 hr tablet; Take 1 tablet (25 mg total) by mouth once daily.  Dispense: 90 tablet; Refill: 3          Health Maintenance Due   Topic Date Due    TETANUS VACCINE  Never done    DEXA Scan  Never done    Shingles Vaccine (1 of 2) Never done    Pneumococcal Vaccines (Age 65+) (2 - PPSV23 or PCV20) 04/04/2017       Problem List Items Addressed This Visit          Cardiac/Vascular    Hyperlipidemia    Relevant Medications    atorvastatin (LIPITOR) 20 MG tablet    Hypertension    Relevant Medications    olmesartan-hydrochlorothiazide (BENICAR HCT) 40-25 mg per tablet    metoprolol succinate (TOPROL-XL) 25 MG 24 hr tablet     Other Visit Diagnoses       Gastroesophageal reflux disease, unspecified whether esophagitis present        Relevant Medications    lansoprazole (PREVACID) 30 MG capsule            Health Maintenance Topics with due status: Not Due       Topic Last Completion Date    Influenza Vaccine 02/24/2023    Lipid Panel 08/14/2023       Future Appointments   Date Time Provider Department Center   10/18/2023 12:45 PM Reyna Durán ACNP [facility] CARD [facility]   2/19/2024  8:30 AM Noe Haile MD [facility]        There are no Patient Instructions on file for this visit.  Follow up in about 6 months (around 2/16/2024) for routine followup.     Signature:  Noe Haile MD      Date of encounter: 8/16/23

## 2023-10-18 ENCOUNTER — OFFICE VISIT (OUTPATIENT)
Dept: CARDIOLOGY | Facility: CLINIC | Age: 83
End: 2023-10-18
Payer: MEDICARE

## 2023-10-18 VITALS
SYSTOLIC BLOOD PRESSURE: 128 MMHG | HEIGHT: 69 IN | DIASTOLIC BLOOD PRESSURE: 70 MMHG | HEART RATE: 62 BPM | WEIGHT: 205.19 LBS | OXYGEN SATURATION: 97 % | BODY MASS INDEX: 30.39 KG/M2

## 2023-10-18 DIAGNOSIS — I10 HYPERTENSION, UNSPECIFIED TYPE: Primary | ICD-10-CM

## 2023-10-18 DIAGNOSIS — I48.19 PERSISTENT ATRIAL FIBRILLATION: ICD-10-CM

## 2023-10-18 PROCEDURE — 93010 ELECTROCARDIOGRAM REPORT: CPT | Mod: S$PBB,,, | Performed by: INTERNAL MEDICINE

## 2023-10-18 PROCEDURE — 99214 OFFICE O/P EST MOD 30 MIN: CPT | Mod: S$PBB,,, | Performed by: NURSE PRACTITIONER

## 2023-10-18 PROCEDURE — 93005 ELECTROCARDIOGRAM TRACING: CPT | Mod: PBBFAC | Performed by: INTERNAL MEDICINE

## 2023-10-18 PROCEDURE — 93010 EKG 12-LEAD: ICD-10-PCS | Mod: S$PBB,,, | Performed by: INTERNAL MEDICINE

## 2023-10-18 PROCEDURE — 99214 PR OFFICE/OUTPT VISIT, EST, LEVL IV, 30-39 MIN: ICD-10-PCS | Mod: S$PBB,,, | Performed by: NURSE PRACTITIONER

## 2023-10-18 PROCEDURE — 99214 OFFICE O/P EST MOD 30 MIN: CPT | Mod: PBBFAC | Performed by: NURSE PRACTITIONER

## 2023-10-20 NOTE — ASSESSMENT & PLAN NOTE
Patient has history ofgyno bleeding while taking Xarelto.  She declined to try any other anticoagulants.  We briefly spoke regarding potential for Watchman implantation.  The patient is familiar with the Watchman procedure but does not wish to pursue at this time.

## 2023-10-20 NOTE — ASSESSMENT & PLAN NOTE
Lipid panel results from 08/14/2023 reviewed   Total cholesterol 140   Triglycerides 116   HDL 50   LDL 67   Lipitor 20 mg p.o. daily   Lipids followed by PCP

## 2023-10-20 NOTE — PROGRESS NOTES
PCP: Noe Haile MD    Referring Provider:     Subjective:   Tonya Sandoval is a 83 y.o. female with hx of persistent atrial fibrillation, GERD, hyperlipidemia, hypertension, and osteoarthritis, who presents for routine follow-up.  The patient states she is doing well and denies complaints.        Fhx:  Family History   Problem Relation Age of Onset    Brain cancer Father     Cancer Sister     Heart disease Brother      Shx:   Social History     Socioeconomic History    Marital status: Single   Tobacco Use    Smoking status: Never     Passive exposure: Never    Smokeless tobacco: Never   Substance and Sexual Activity    Alcohol use: Never    Drug use: Never    Sexual activity: Not Currently       EKG 10/18/2023 atrial fibrillation with slow ventricular response, heart rate 54 beats per minute, right superior axis deviation, pulmonary disease pattern, septal Q-waves also present on previous EKG  10/18/2022 atrial fibrillation with ventricular rate of 58 beats per minute unchanged from previously tracing       ECHO No results found for this or any previous visit.    [facility] 07/01/2014   1. Normal right-dominant coronary arteries free of significant coronary artery disease   2. Normal LV size and systolic function  3. No significant mitral regurgitation       Lab Results   Component Value Date     08/14/2023    K 3.9 08/14/2023     08/14/2023    CO2 29 08/14/2023    BUN 16 08/14/2023    CREATININE 0.92 08/14/2023    CALCIUM 9.4 08/14/2023    ANIONGAP 9 08/14/2023    EGFRNONAA 61 02/23/2022       Lab Results   Component Value Date    CHOL 140 08/14/2023    CHOL 145 02/24/2023    CHOL 153 08/23/2022     Lab Results   Component Value Date    HDL 50 08/14/2023    HDL 45 02/24/2023    HDL 50 08/23/2022     Lab Results   Component Value Date    LDLCALC 67 08/14/2023    LDLCALC 72 02/24/2023    LDLCALC 80 08/23/2022     Lab Results   Component Value Date    TRIG 116 08/14/2023    TRIG 140 02/24/2023     TRIG 113 08/23/2022     Lab Results   Component Value Date    CHOLHDL 2.8 08/14/2023    CHOLHDL 3.2 02/24/2023    CHOLHDL 3.1 08/23/2022       Lab Results   Component Value Date    WBC 7.07 08/14/2023    HGB 13.4 08/14/2023    HCT 41.7 08/14/2023    MCV 91.0 08/14/2023     08/14/2023           Current Outpatient Medications:     albuterol (VENTOLIN HFA) 90 mcg/actuation inhaler, Inhale 2 puffs into the lungs every 6 (six) hours as needed for Wheezing. Rescue (Patient not taking: Reported on 8/16/2023), Disp: 18 g, Rfl: 11    ALPRAZolam (XANAX) 0.25 MG tablet, Take 1 tablet (0.25 mg total) by mouth 3 (three) times daily. (Patient not taking: Reported on 8/16/2023), Disp: 30 tablet, Rfl: 2    aspirin 325 MG tablet, Take 81 mg by mouth once daily., Disp: , Rfl:     atorvastatin (LIPITOR) 20 MG tablet, Take 1 tablet (20 mg total) by mouth once daily., Disp: 90 tablet, Rfl: 3    azithromycin (Z-TASIA) 250 MG tablet, Take two tablets now then 1 tab daily x 4 days (Patient not taking: Reported on 8/16/2023), Disp: 6 tablet, Rfl: 0    HYDROcodone-acetaminophen (NORCO) 5-325 mg per tablet, Take 1 tablet by mouth every 6 (six) hours as needed for Pain. (Patient not taking: Reported on 8/16/2023), Disp: 12 tablet, Rfl: 0    hydrocodone-homatropine 5-1.5 mg/5 ml (HYCODAN) 5-1.5 mg/5 mL Syrp, Take 5 mLs by mouth every 4 (four) hours as needed. (Patient not taking: Reported on 8/16/2023), Disp: 120 mL, Rfl: 0    lansoprazole (PREVACID) 30 MG capsule, Take 1 capsule (30 mg total) by mouth once daily., Disp: 90 capsule, Rfl: 3    levoFLOXacin (LEVAQUIN) 500 MG tablet, Take 1 tablet (500 mg total) by mouth once daily. (Patient not taking: Reported on 8/16/2023), Disp: 10 tablet, Rfl: 0    metoprolol succinate (TOPROL-XL) 25 MG 24 hr tablet, Take 1 tablet (25 mg total) by mouth once daily., Disp: 90 tablet, Rfl: 3    olmesartan-hydrochlorothiazide (BENICAR HCT) 40-25 mg per tablet, Take 1 tablet by mouth once daily., Disp: 90  "tablet, Rfl: 3    predniSONE (DELTASONE) 20 MG tablet, Take 1 tablet (20 mg total) by mouth 2 (two) times daily. (Patient not taking: Reported on 8/16/2023), Disp: 10 tablet, Rfl: 0  Meds reviewed but not reconciled.  She did not bring her medications today.      Review of Systems   Respiratory:  Negative for shortness of breath.    Cardiovascular:  Negative for chest pain, palpitations, orthopnea, claudication, leg swelling and PND.   Neurological:  Negative for dizziness, loss of consciousness and weakness.           Objective:   /70 (BP Location: Left arm, Patient Position: Sitting)   Pulse 62   Ht 5' 9" (1.753 m)   Wt 93.1 kg (205 lb 3.2 oz)   LMP  (LMP Unknown)   SpO2 97%   BMI 30.30 kg/m²     Physical Exam  Vitals and nursing note reviewed.   Constitutional:       Appearance: Normal appearance. She is obese.   HENT:      Head: Normocephalic and atraumatic.   Neck:      Vascular: No carotid bruit.   Cardiovascular:      Rate and Rhythm: Normal rate. Rhythm irregular.      Pulses: Normal pulses.      Heart sounds: Normal heart sounds.   Pulmonary:      Effort: Pulmonary effort is normal.      Breath sounds: Normal breath sounds.   Abdominal:      Palpations: Abdomen is soft.   Musculoskeletal:      Cervical back: Neck supple.      Right lower leg: No edema.      Left lower leg: No edema.   Skin:     General: Skin is warm and dry.      Capillary Refill: Capillary refill takes less than 2 seconds.   Neurological:      General: No focal deficit present.      Mental Status: She is alert.           Assessment:     1. Hypertension, unspecified type  EKG 12-lead    EKG 12-lead      2. Persistent atrial fibrillation              Plan:   Atrial fibrillation  Patient has history ofgyno bleeding while taking Xarelto.  She declined to try any other anticoagulants.  We briefly spoke regarding potential for Watchman implantation.  The patient is familiar with the Watchman procedure but does not wish to pursue at " this time.    Hyperlipidemia  Lipid panel results from 08/14/2023 reviewed   Total cholesterol 140   Triglycerides 116   HDL 50   LDL 67   Lipitor 20 mg p.o. daily   Lipids followed by PCP    Hypertension  128/70 mmHg    Return to clinic in 1 year

## 2023-12-05 ENCOUNTER — OFFICE VISIT (OUTPATIENT)
Dept: FAMILY MEDICINE | Facility: CLINIC | Age: 83
End: 2023-12-05
Payer: MEDICARE

## 2023-12-05 VITALS
RESPIRATION RATE: 16 BRPM | WEIGHT: 201 LBS | HEART RATE: 59 BPM | SYSTOLIC BLOOD PRESSURE: 171 MMHG | DIASTOLIC BLOOD PRESSURE: 79 MMHG | OXYGEN SATURATION: 99 % | BODY MASS INDEX: 29.77 KG/M2 | HEIGHT: 69 IN

## 2023-12-05 DIAGNOSIS — I10 PRIMARY HYPERTENSION: ICD-10-CM

## 2023-12-05 DIAGNOSIS — J01.00 ACUTE NON-RECURRENT MAXILLARY SINUSITIS: Primary | ICD-10-CM

## 2023-12-05 PROCEDURE — 99213 OFFICE O/P EST LOW 20 MIN: CPT | Mod: ,,, | Performed by: FAMILY MEDICINE

## 2023-12-05 PROCEDURE — 96372 THER/PROPH/DIAG INJ SC/IM: CPT | Mod: ,,, | Performed by: FAMILY MEDICINE

## 2023-12-05 PROCEDURE — 99213 PR OFFICE/OUTPT VISIT, EST, LEVL III, 20-29 MIN: ICD-10-PCS | Mod: ,,, | Performed by: FAMILY MEDICINE

## 2023-12-05 PROCEDURE — 96372 PR INJECTION,THERAP/PROPH/DIAG2ST, IM OR SUBCUT: ICD-10-PCS | Mod: ,,, | Performed by: FAMILY MEDICINE

## 2023-12-05 RX ORDER — AMOXICILLIN AND CLAVULANATE POTASSIUM 875; 125 MG/1; MG/1
1 TABLET, FILM COATED ORAL EVERY 12 HOURS
Qty: 20 TABLET | Refills: 0 | Status: SHIPPED | OUTPATIENT
Start: 2023-12-05 | End: 2024-03-20

## 2023-12-05 RX ORDER — DEXAMETHASONE SODIUM PHOSPHATE 4 MG/ML
8 INJECTION, SOLUTION INTRA-ARTICULAR; INTRALESIONAL; INTRAMUSCULAR; INTRAVENOUS; SOFT TISSUE
Status: COMPLETED | OUTPATIENT
Start: 2023-12-05 | End: 2023-12-05

## 2023-12-05 RX ADMIN — DEXAMETHASONE SODIUM PHOSPHATE 8 MG: 4 INJECTION, SOLUTION INTRA-ARTICULAR; INTRALESIONAL; INTRAMUSCULAR; INTRAVENOUS; SOFT TISSUE at 03:12

## 2023-12-05 NOTE — PROGRESS NOTES
Noe Haile MD        PATIENT NAME: Tonya Sandoval  : 1940  DATE: 23  MRN: [id number]      Billing Provider: Noe Haile MD  Level of Service: OR OFFICE/OUTPT VISIT, EST, LEVL III, 20-29 MIN  Patient PCP Information       Provider PCP Type    Noe Haile MD General            Reason for Visit / Chief Complaint: URI       Update PCP  Update Chief Complaint         History of Present Illness / Problem Focused Workflow     Tonya Sandoval presents to the clinic with URI     URI symptoms for one week without fever.      URI   Associated symptoms include congestion and coughing. Pertinent negatives include no abdominal pain, chest pain, diarrhea, headaches, nausea, neck pain, rash, rhinorrhea, sinus pain, sore throat or wheezing.       Review of Systems     Review of Systems   Constitutional:  Negative for activity change, appetite change, fever and unexpected weight change.   HENT:  Positive for congestion. Negative for rhinorrhea, sinus pressure, sinus pain, sore throat and trouble swallowing.    Eyes:  Negative for photophobia, pain, discharge and visual disturbance.   Respiratory:  Positive for cough. Negative for chest tightness, wheezing and stridor.    Cardiovascular:  Negative for chest pain, palpitations and leg swelling.   Gastrointestinal:  Negative for abdominal pain, blood in stool, constipation, diarrhea and nausea.   Endocrine: Negative for polydipsia, polyphagia and polyuria.   Genitourinary:  Negative for difficulty urinating, flank pain and hematuria.   Musculoskeletal:  Negative for arthralgias and neck pain.   Skin:  Negative for rash.   Allergic/Immunologic: Negative for food allergies.   Neurological:  Negative for dizziness, tremors, seizures, syncope, weakness (global weakness) and headaches.   Psychiatric/Behavioral:  Negative for behavioral problems, confusion, decreased concentration, dysphoric mood and hallucinations. The patient is not nervous/anxious.          Medical / Social / Family History     Past Medical History:   Diagnosis Date    Atrial fibrillation     GERD (gastroesophageal reflux disease)     Hyperlipidemia     Hypertension     OA (osteoarthritis)        Past Surgical History:   Procedure Laterality Date    CARDIAC CATHETERIZATION      HYSTERECTOMY         Social History  Ms.  reports that she has never smoked. She has never been exposed to tobacco smoke. She has never used smokeless tobacco. She reports that she does not drink alcohol and does not use drugs.    Family History  Ms.'s family history includes Brain cancer in her father; Cancer in her sister; Heart disease in her brother.    Medications and Allergies     Medications  No outpatient medications have been marked as taking for the 12/5/23 encounter (Office Visit) with Noe Haile MD.     Current Facility-Administered Medications for the 12/5/23 encounter (Office Visit) with Noe Haile MD   Medication Dose Route Frequency Provider Last Rate Last Admin    dexAMETHasone injection 8 mg  8 mg Intramuscular 1 time in Clinic/HOD Noe Haile MD           Allergies  Review of patient's allergies indicates:   Allergen Reactions    Codeine     Nickel sutures [surgical stainless steel]        Physical Examination     Vitals:    12/05/23 1431   BP: (!) 171/79   Pulse: (!) 59   Resp: 16     Physical Exam  Constitutional:       General: She is not in acute distress.     Appearance: Normal appearance.   HENT:      Head: Normocephalic.      Right Ear: Tympanic membrane and ear canal normal.      Left Ear: Tympanic membrane and ear canal normal.      Nose: Congestion present.      Mouth/Throat:      Mouth: Mucous membranes are moist.      Pharynx: No oropharyngeal exudate.   Eyes:      Extraocular Movements: Extraocular movements intact.      Pupils: Pupils are equal, round, and reactive to light.   Cardiovascular:      Rate and Rhythm: Normal rate and regular rhythm.      Heart sounds: No  murmur heard.  Pulmonary:      Effort: Pulmonary effort is normal.      Breath sounds: Normal breath sounds. No wheezing.   Abdominal:      General: Abdomen is flat. Bowel sounds are normal.      Palpations: Abdomen is soft.      Hernia: No hernia is present.   Musculoskeletal:         General: Normal range of motion.      Cervical back: Normal range of motion and neck supple.      Right lower leg: No edema.      Left lower leg: No edema.   Lymphadenopathy:      Cervical: No cervical adenopathy.   Skin:     General: Skin is warm and dry.      Coloration: Skin is not jaundiced.      Findings: No lesion.   Neurological:      General: No focal deficit present.      Mental Status: She is alert and oriented to person, place, and time.      Cranial Nerves: No cranial nerve deficit.      Gait: Gait normal.   Psychiatric:         Mood and Affect: Mood normal.         Behavior: Behavior normal.         Judgment: Judgment normal.          Assessment and Plan (including Health Maintenance)      Problem List  Smart Sets  Document Outside HM   :    Plan:           Health Maintenance Due   Topic Date Due    TETANUS VACCINE  Never done    DEXA Scan  Never done    Shingles Vaccine (1 of 2) Never done    RSV Vaccine (Age 60+ and Pregnant patients) (1 - 1-dose 60+ series) Never done    Pneumococcal Vaccines (Age 65+) (2 - PPSV23 or PCV20) 04/04/2017    Influenza Vaccine (1) 09/01/2023    COVID-19 Vaccine (5 - 2023-24 season) 09/01/2023       1. Acute non-recurrent maxillary sinusitis  -     dexAMETHasone injection 8 mg  -     amoxicillin-clavulanate 875-125mg (AUGMENTIN) 875-125 mg per tablet; Take 1 tablet by mouth every 12 (twelve) hours.  Dispense: 20 tablet; Refill: 0    2. Primary hypertension         Health Maintenance Topics with due status: Not Due       Topic Last Completion Date    Lipid Panel 08/14/2023       Future Appointments   Date Time Provider Department Center   2/19/2024  8:30 AM Noe Haile MD [facility]  [facility]        There are no Patient Instructions on file for this visit.  Follow up if symptoms worsen or fail to improve.     Signature:  Noe Haile MD      Date of encounter: 12/5/23

## 2023-12-09 DIAGNOSIS — Z71.89 COMPLEX CARE COORDINATION: ICD-10-CM

## 2024-01-01 ENCOUNTER — HOSPITAL ENCOUNTER (EMERGENCY)
Facility: HOSPITAL | Age: 84
Discharge: HOME | End: 2024-01-01
Payer: MEDICARE

## 2024-01-01 VITALS
RESPIRATION RATE: 18 BRPM | TEMPERATURE: 98 F | HEIGHT: 69 IN | HEART RATE: 87 BPM | WEIGHT: 201 LBS | OXYGEN SATURATION: 99 % | DIASTOLIC BLOOD PRESSURE: 82 MMHG | BODY MASS INDEX: 29.77 KG/M2 | SYSTOLIC BLOOD PRESSURE: 171 MMHG

## 2024-01-01 DIAGNOSIS — S42.291A CLOSED FRACTURE OF HEAD OF RIGHT HUMERUS, INITIAL ENCOUNTER: Primary | ICD-10-CM

## 2024-01-01 DIAGNOSIS — T14.90XA INJURY: ICD-10-CM

## 2024-01-01 PROCEDURE — 96372 THER/PROPH/DIAG INJ SC/IM: CPT | Performed by: NURSE PRACTITIONER

## 2024-01-01 PROCEDURE — 63600175 PHARM REV CODE 636 W HCPCS: Performed by: NURSE PRACTITIONER

## 2024-01-01 PROCEDURE — 25000003 PHARM REV CODE 250: Performed by: NURSE PRACTITIONER

## 2024-01-01 PROCEDURE — 99284 EMERGENCY DEPT VISIT MOD MDM: CPT

## 2024-01-01 PROCEDURE — 99284 EMERGENCY DEPT VISIT MOD MDM: CPT | Mod: ,,, | Performed by: NURSE PRACTITIONER

## 2024-01-01 RX ORDER — KETOROLAC TROMETHAMINE 30 MG/ML
30 INJECTION, SOLUTION INTRAMUSCULAR; INTRAVENOUS
Status: COMPLETED | OUTPATIENT
Start: 2024-01-01 | End: 2024-01-01

## 2024-01-01 RX ORDER — TIZANIDINE 4 MG/1
4 TABLET ORAL EVERY 6 HOURS PRN
Qty: 20 TABLET | Refills: 0 | Status: SHIPPED | OUTPATIENT
Start: 2024-01-01 | End: 2024-01-11

## 2024-01-01 RX ORDER — IBUPROFEN 800 MG/1
800 TABLET ORAL 3 TIMES DAILY
Qty: 20 TABLET | Refills: 0 | Status: SHIPPED | OUTPATIENT
Start: 2024-01-01

## 2024-01-01 RX ORDER — METHOCARBAMOL 500 MG/1
500 TABLET, FILM COATED ORAL
Status: COMPLETED | OUTPATIENT
Start: 2024-01-01 | End: 2024-01-01

## 2024-01-01 RX ADMIN — METHOCARBAMOL 500 MG: 500 TABLET ORAL at 04:01

## 2024-01-01 RX ADMIN — KETOROLAC TROMETHAMINE 30 MG: 30 INJECTION, SOLUTION INTRAMUSCULAR at 04:01

## 2024-01-01 NOTE — DISCHARGE INSTRUCTIONS
Wear sling for comfort.  Take medications as prescribed. Call in AM to schedule follow up appointment with Dr Poon in ortho clinic.  Return to ER with new or worsening symptoms.

## 2024-01-02 ENCOUNTER — OFFICE VISIT (OUTPATIENT)
Dept: ORTHOPEDICS | Facility: CLINIC | Age: 84
End: 2024-01-02
Payer: MEDICARE

## 2024-01-02 DIAGNOSIS — S42.214A CLOSED NONDISPLACED FRACTURE OF SURGICAL NECK OF RIGHT HUMERUS, UNSPECIFIED FRACTURE MORPHOLOGY, INITIAL ENCOUNTER: Primary | ICD-10-CM

## 2024-01-02 DIAGNOSIS — S42.291A CLOSED FRACTURE OF HEAD OF RIGHT HUMERUS, INITIAL ENCOUNTER: ICD-10-CM

## 2024-01-02 PROCEDURE — 23600 CLTX PROX HUMRL FX W/O MNPJ: CPT | Mod: S$PBB,RT,, | Performed by: ORTHOPAEDIC SURGERY

## 2024-01-02 PROCEDURE — 23600 CLTX PROX HUMRL FX W/O MNPJ: CPT | Mod: PBBFAC | Performed by: ORTHOPAEDIC SURGERY

## 2024-01-02 PROCEDURE — 99203 OFFICE O/P NEW LOW 30 MIN: CPT | Mod: S$PBB,57,, | Performed by: ORTHOPAEDIC SURGERY

## 2024-01-02 PROCEDURE — 99212 OFFICE O/P EST SF 10 MIN: CPT | Mod: PBBFAC,25 | Performed by: ORTHOPAEDIC SURGERY

## 2024-01-02 NOTE — PROGRESS NOTES
CC:    Chief Complaint   Patient presents with    Right Shoulder - Injury           Previos History :        History:  1/2/2024   Tonya Sandoval is a 83 y.o.  status post patient fell yesterday went to emergency room right shoulder pain x-rays show an impacted humeral neck fracture she has in a sling we are going to place her in a shoulder immobilizer        PE:   Shoulder is painful motion is not attempted she is neurovascularly intact skin is intact      Radiology:  X-rays reviewed from the emergency room yesterday right shoulder impacted humeral neck fracture acceptable alignment she does have pseudo subluxation inferiorly  X-rays are reviewed with patient      Ass/Plan:  Assessment right humeral neck fracture impacted discussed with her would recommend non operative treatment rather than surgical treatment here the importance of wearing the shoulder immobilizer was discussed at length we will place her in a shoulder immobilizer day do a follow-up x-ray in 2 weeks.        Jose Alfredo Poon III, MD    Subject to voice recognition errors,  transcription services are not allowed

## 2024-01-03 ENCOUNTER — TELEPHONE (OUTPATIENT)
Dept: EMERGENCY MEDICINE | Facility: HOSPITAL | Age: 84
End: 2024-01-03
Payer: MEDICARE

## 2024-01-17 DIAGNOSIS — S42.214A CLOSED NONDISPLACED FRACTURE OF SURGICAL NECK OF RIGHT HUMERUS, UNSPECIFIED FRACTURE MORPHOLOGY, INITIAL ENCOUNTER: Primary | ICD-10-CM

## 2024-01-18 ENCOUNTER — OFFICE VISIT (OUTPATIENT)
Dept: ORTHOPEDICS | Facility: CLINIC | Age: 84
End: 2024-01-18
Payer: MEDICARE

## 2024-01-18 ENCOUNTER — HOSPITAL ENCOUNTER (OUTPATIENT)
Dept: RADIOLOGY | Facility: HOSPITAL | Age: 84
Discharge: HOME | End: 2024-01-18
Attending: ORTHOPAEDIC SURGERY
Payer: MEDICARE

## 2024-01-18 DIAGNOSIS — Z09 FOLLOW-UP EXAMINATION, FOLLOWING OTHER SURGERY: Primary | ICD-10-CM

## 2024-01-18 DIAGNOSIS — S42.214A CLOSED NONDISPLACED FRACTURE OF SURGICAL NECK OF RIGHT HUMERUS, UNSPECIFIED FRACTURE MORPHOLOGY, INITIAL ENCOUNTER: ICD-10-CM

## 2024-01-18 PROCEDURE — 99024 POSTOP FOLLOW-UP VISIT: CPT | Mod: ,,, | Performed by: ORTHOPAEDIC SURGERY

## 2024-01-18 PROCEDURE — 73030 X-RAY EXAM OF SHOULDER: CPT | Mod: TC,RT

## 2024-01-18 PROCEDURE — 99212 OFFICE O/P EST SF 10 MIN: CPT | Mod: PBBFAC | Performed by: ORTHOPAEDIC SURGERY

## 2024-01-18 PROCEDURE — 73030 X-RAY EXAM OF SHOULDER: CPT | Mod: 26,RT,, | Performed by: ORTHOPAEDIC SURGERY

## 2024-01-18 NOTE — PROGRESS NOTES
CC:   Chief Complaint   Patient presents with    Right Shoulder - Injury     DOI 1/1 (2WKS)        PREVIOUS INFO:     History:  1/2/2024   Tonya Sandoval is a 83 y.o.  status post patient fell yesterday went to emergency room right shoulder pain x-rays show an impacted humeral neck fracture she has in a sling we are going to place her in a shoulder immobilizer           HISTORY:   1/18/2024    Tonya Sandoval  is a 83 y.o. 2 week follow-up impacted right humeral neck fracture treated conservatively date of injury 01/01/2024 patient states she is doing better      PAST MEDICAL HISTORY:   Past Medical History:   Diagnosis Date    Atrial fibrillation     GERD (gastroesophageal reflux disease)     Hyperlipidemia     Hypertension     OA (osteoarthritis)           PAST SURGICAL HISTORY:   Past Surgical History:   Procedure Laterality Date    CARDIAC CATHETERIZATION      HYSTERECTOMY            ALLERGIES:   Review of patient's allergies indicates:   Allergen Reactions    Codeine     Nickel sutures [surgical stainless steel]         MEDICATIONS :    Current Outpatient Medications:     amoxicillin-clavulanate 875-125mg (AUGMENTIN) 875-125 mg per tablet, Take 1 tablet by mouth every 12 (twelve) hours., Disp: 20 tablet, Rfl: 0    aspirin 325 MG tablet, Take 81 mg by mouth once daily., Disp: , Rfl:     atorvastatin (LIPITOR) 20 MG tablet, Take 1 tablet (20 mg total) by mouth once daily., Disp: 90 tablet, Rfl: 3    ibuprofen (ADVIL,MOTRIN) 800 MG tablet, Take 1 tablet (800 mg total) by mouth 3 (three) times daily., Disp: 20 tablet, Rfl: 0    lansoprazole (PREVACID) 30 MG capsule, Take 1 capsule (30 mg total) by mouth once daily., Disp: 90 capsule, Rfl: 3    metoprolol succinate (TOPROL-XL) 25 MG 24 hr tablet, Take 1 tablet (25 mg total) by mouth once daily., Disp: 90 tablet, Rfl: 3    olmesartan-hydrochlorothiazide (BENICAR HCT) 40-25 mg per tablet, Take 1 tablet by mouth once daily., Disp: 90 tablet,  Rfl: 3     SOCIAL HISTORY:   Social History     Socioeconomic History    Marital status: Single   Tobacco Use    Smoking status: Never     Passive exposure: Never    Smokeless tobacco: Never   Substance and Sexual Activity    Alcohol use: Never    Drug use: Never    Sexual activity: Not Currently        ROS    FAMILY HISTORY:   Family History   Problem Relation Age of Onset    Brain cancer Father     Cancer Sister     Heart disease Brother           PHYSICAL EXAM: There were no vitals filed for this visit.            There is no height or weight on file to calculate BMI.     In general, this is a well-developed, well-nourished female . The patient is alert, oriented and cooperative.      HEENT:  Normocephalic, atraumatic.  Extraocular movements are intact bilaterally.  The oropharynx is benign.       NECK:  Nontender with good range of motion.      PULMONARY: Respirations are even and non-labored.       CARDIOVASCULAR: Pulses regular by peripheral palpation.       ABDOMEN:  Soft, non-tender, non-distended.        EXTREMITIES:  Neurovascularly intact went over pendulum exercises and elbow range motion    Ortho Exam      RADIOGRAPHIC FINDINGS:  Right shoulder AP transscapular lateral impacted humeral neck fracture acceptable alignment joints congruent reduced.      .      IMPRESSION:  Right humeral neck fracture impacted    PLAN:  Pendulum exercises gentle range motion of the elbow continue shoulder immobilizer problems 4 weeks with x-rays        No follow-ups on file.         Jose Alfredo Poon III      (Subject to voice recognition error, transcription service not allowed)

## 2024-02-06 NOTE — ED PROVIDER NOTES
Encounter Date: 1/1/2024       History     Chief Complaint   Patient presents with    Shoulder Injury     Patient was presents to the ER with complaint of fall.  Patient was states she lost her balance and fell onto her right shoulder and upper arm.  She reports severe pain that started right after the fall.  She denies hitting her head.  She denies LOC. No other injuries    The history is provided by the patient. No  was used.     Review of patient's allergies indicates:   Allergen Reactions    Codeine     Nickel sutures [surgical stainless steel]      Past Medical History:   Diagnosis Date    Atrial fibrillation     GERD (gastroesophageal reflux disease)     Hyperlipidemia     Hypertension     OA (osteoarthritis)      Past Surgical History:   Procedure Laterality Date    CARDIAC CATHETERIZATION      HYSTERECTOMY       Family History   Problem Relation Age of Onset    Brain cancer Father     Cancer Sister     Heart disease Brother      Social History     Tobacco Use    Smoking status: Never     Passive exposure: Never    Smokeless tobacco: Never   Substance Use Topics    Alcohol use: Never    Drug use: Never     Review of Systems   Constitutional:  Positive for activity change and fatigue.   Musculoskeletal:  Positive for arthralgias and myalgias.        Right shoulder pain after fall.   All other systems reviewed and are negative.      Physical Exam     Initial Vitals [01/01/24 1542]   BP Pulse Resp Temp SpO2   (!) 171/82 87 18 97.7 °F (36.5 °C) 99 %      MAP       --         Physical Exam    Nursing note and vitals reviewed.  Constitutional: She appears well-developed and well-nourished.   HENT:   Head: Normocephalic.   Nose: Nose normal.   Mouth/Throat: Oropharynx is clear and moist.   Eyes: Conjunctivae and EOM are normal.   Neck: Neck supple.   Normal range of motion.  Cardiovascular:  Normal rate, normal heart sounds and intact distal pulses.           Pulmonary/Chest: Breath sounds  normal.   Abdominal: Abdomen is soft. Bowel sounds are normal.   Musculoskeletal:         General: Tenderness and edema present.      Cervical back: Normal range of motion and neck supple.      Comments: Right upper deformity, limited range of motion due to pain     Neurological: She is alert and oriented to person, place, and time. She has normal strength. GCS score is 15. GCS eye subscore is 4. GCS verbal subscore is 5. GCS motor subscore is 6.   Skin: Skin is warm and dry. Capillary refill takes less than 2 seconds.   Psychiatric: She has a normal mood and affect. Thought content normal.         Medical Screening Exam   See Full Note    ED Course   Procedures  Labs Reviewed - No data to display       Imaging Results              X-Ray Shoulder Complete 2 View Right (Final result)  Result time 01/01/24 16:05:04      Final result by Jacobo Best DO (01/01/24 16:05:04)                   Impression:      Acute comminuted fracture of the humeral head/neck. There is inferior subluxation of the humerus.      Electronically signed by: Jacobo Best  Date:    01/01/2024  Time:    16:05               Narrative:    EXAMINATION:  XR SHOULDER COMPLETE 2 OR MORE VIEWS RIGHT    CLINICAL HISTORY:  Injury, unspecified, initial encounter    TECHNIQUE:  XR SHOULDER COMPLETE 2 OR MORE VIEWS RIGHT    COMPARISON:  1/4/23    FINDINGS:  Acute comminuted fracture of the humeral head/neck.  There is inferior subluxation of the humerus.                                       Medications   ketorolac injection 30 mg (30 mg Intramuscular Given 1/1/24 1651)   methocarbamoL tablet 500 mg (500 mg Oral Given 1/1/24 1650)     Medical Decision Making  Patient was presents to the ER with complaint of fall.  Patient was states she lost her balance and fell onto her right shoulder and upper arm.  She reports severe pain that started right after the fall.  She denies hitting her head.  She denies LOC. No other injuries      Amount and/or  Complexity of Data Reviewed  Radiology: ordered. Decision-making details documented in ED Course.  Discussion of management or test interpretation with external provider(s): Toradol 60 mg IM  Robaxin 500 mg p.o. to treat right shoulder pain and muscle spasm    Patient was placed in a sling right arm.    Patient was discharged home with diagnosis of fall with closed fracture of the head right humerus.  Patient was instructed to worse slowly follow up appointment with Dr. Poon in the orthopedic clinic.  Patient was instructed to call office in a.m. to schedule appointment.  She was given a prescription for ibuprofen and tizanidine to take as prescribed.  She was told to rest ice and elevate extremity for comfort.  She was told to sleep in similar position it was tolerated.  Return to the ER with new or worsening symptoms.    Risk  Prescription drug management.                                      Clinical Impression:   Final diagnoses:  [T14.90XA] Injury  [S42.291A] Closed fracture of head of right humerus, initial encounter (Primary)        ED Disposition Condition    Discharge Stable          ED Prescriptions       Medication Sig Dispense Start Date End Date Auth. Provider    ibuprofen (ADVIL,MOTRIN) 800 MG tablet Take 1 tablet (800 mg total) by mouth 3 (three) times daily. 20 tablet 2024 -- Indiana Doty FNP    tiZANidine (ZANAFLEX) 4 MG tablet () Take 1 tablet (4 mg total) by mouth every 6 (six) hours as needed (muscle spasm). 20 tablet 2024 Indiana Doty FNP          Follow-up Information    None          Indiana Doty FNP  [phone]

## 2024-02-12 DIAGNOSIS — S42.214A CLOSED NONDISPLACED FRACTURE OF SURGICAL NECK OF RIGHT HUMERUS, UNSPECIFIED FRACTURE MORPHOLOGY, INITIAL ENCOUNTER: Primary | ICD-10-CM

## 2024-02-15 ENCOUNTER — HOSPITAL ENCOUNTER (OUTPATIENT)
Dept: RADIOLOGY | Facility: HOSPITAL | Age: 84
Discharge: HOME | End: 2024-02-15
Attending: ORTHOPAEDIC SURGERY
Payer: MEDICARE

## 2024-02-15 ENCOUNTER — OFFICE VISIT (OUTPATIENT)
Dept: ORTHOPEDICS | Facility: CLINIC | Age: 84
End: 2024-02-15
Payer: MEDICARE

## 2024-02-15 DIAGNOSIS — Z09 FOLLOW-UP EXAMINATION, FOLLOWING OTHER SURGERY: Primary | ICD-10-CM

## 2024-02-15 DIAGNOSIS — S42.214A CLOSED NONDISPLACED FRACTURE OF SURGICAL NECK OF RIGHT HUMERUS, UNSPECIFIED FRACTURE MORPHOLOGY, INITIAL ENCOUNTER: ICD-10-CM

## 2024-02-15 PROCEDURE — 99024 POSTOP FOLLOW-UP VISIT: CPT | Mod: ,,, | Performed by: ORTHOPAEDIC SURGERY

## 2024-02-15 PROCEDURE — 73030 X-RAY EXAM OF SHOULDER: CPT | Mod: 26,RT,, | Performed by: ORTHOPAEDIC SURGERY

## 2024-02-15 PROCEDURE — 99212 OFFICE O/P EST SF 10 MIN: CPT | Mod: PBBFAC,25 | Performed by: ORTHOPAEDIC SURGERY

## 2024-02-15 PROCEDURE — 73030 X-RAY EXAM OF SHOULDER: CPT | Mod: TC,RT

## 2024-02-15 NOTE — PROGRESS NOTES
CC:    Chief Complaint   Patient presents with    Right Shoulder - Injury     RT HUMERAL FX DOI 1/1 (6WKS)           Previos History :  History:  1/2/2024   Tonya Sandoval is a 83 y.o.  status post patient fell yesterday went to emergency room right shoulder pain x-rays show an impacted humeral neck fracture she has in a sling we are going to place her in a shoulder immobilizer             HISTORY:   1/18/2024    Tonya Sandoval  is a 83 y.o. 2 week follow-up impacted right humeral neck fracture treated conservatively date of injury 01/01/2024 patient states she is doing better      History:  2/15/2024   Tonya Sandoval is a 83 y.o.  status post 6 weeks out right humeral neck fracture treated conservatively date of injury 01/01/2024        PE:   She can pendulum exercises      Radiology:  Right shoulder AP transscapular lateral there is normal bone mineralization glenohumeral joints reduced impacted humeral neck fracture overall good alignment        Ass/Plan:  Continue pendulum she can walk the wall the fingers she can eat and drink she goes out about I would wear the sling x-ray in about 3 weeks right shoulder        Jose Alfredo Poon III, MD    Subject to voice recognition errors,  transcription services are not allowed

## 2024-03-04 DIAGNOSIS — Z09 FOLLOW-UP EXAMINATION, FOLLOWING OTHER SURGERY: Primary | ICD-10-CM

## 2024-03-07 ENCOUNTER — HOSPITAL ENCOUNTER (OUTPATIENT)
Dept: RADIOLOGY | Facility: HOSPITAL | Age: 84
Discharge: HOME | End: 2024-03-07
Attending: ORTHOPAEDIC SURGERY
Payer: MEDICARE

## 2024-03-07 ENCOUNTER — OFFICE VISIT (OUTPATIENT)
Dept: ORTHOPEDICS | Facility: CLINIC | Age: 84
End: 2024-03-07
Payer: MEDICARE

## 2024-03-07 DIAGNOSIS — Z09 FOLLOW-UP EXAMINATION, FOLLOWING OTHER SURGERY: ICD-10-CM

## 2024-03-07 DIAGNOSIS — Z09 FOLLOW-UP EXAMINATION, FOLLOWING OTHER SURGERY: Primary | ICD-10-CM

## 2024-03-07 PROCEDURE — 99212 OFFICE O/P EST SF 10 MIN: CPT | Mod: PBBFAC,25 | Performed by: ORTHOPAEDIC SURGERY

## 2024-03-07 PROCEDURE — 73030 X-RAY EXAM OF SHOULDER: CPT | Mod: 26,RT,, | Performed by: ORTHOPAEDIC SURGERY

## 2024-03-07 PROCEDURE — 73030 X-RAY EXAM OF SHOULDER: CPT | Mod: TC,RT

## 2024-03-07 PROCEDURE — 99024 POSTOP FOLLOW-UP VISIT: CPT | Mod: ,,, | Performed by: ORTHOPAEDIC SURGERY

## 2024-03-07 NOTE — PROGRESS NOTES
CC:    Chief Complaint   Patient presents with    Follow-up     RT HUMERAL FX DOI 1/1 (9WKS)           Previos History :    History:  1/2/2024   Tonya Sandoval is a 83 y.o.  status post patient fell yesterday went to emergency room right shoulder pain x-rays show an impacted humeral neck fracture she has in a sling we are going to place her in a shoulder immobilizer             HISTORY:   1/18/2024    Tonya Sandoval  is a 83 y.o. 2 week follow-up impacted right humeral neck fracture treated conservatively date of injury 01/01/2024 patient states she is doing better    History:  3/7/2024   Tonya Sandoval is a 83 y.o.  status post follow-up right humeral neck fracture date of injury 01/01/2024 about 9 weeks ago        PE:   She has about 80° forward flexion and abduction has not improved much      Radiology:  Right shoulder AP transscapular lateral impacted humeral neck fracture new bone formation healing fracture        Ass/Plan:  Fracture continue to heal would recommend get her in to some formal physical therapy gentle range of motion a prescription for a home suyapa        Jose Alfredo Poon III, MD    Subject to voice recognition errors,  transcription services are not allowed

## 2024-03-07 NOTE — PROGRESS NOTES
See PLAN OF CARE     Sup Visit performed today with ESCOBAR Urbano and ESCOBAR Ma.  All goals and treatment plan reviewed. Will work toward completion of all goals set.     Plans for first treatment:    Shoulder Exercises       UBE    Pulleys    Corner Stretch     Cane Flexion on Wall     Cane Flexion off Wall     Cane behind back    TRX hang outs and walk outs    Cybex Shoulder Press    Cybex Lat Pull Down    Cybex/Cable Bicep Curls     Cybex/Cable Tricep Press Downs                Neuro Re-Education x        Shoulder Int Rot and Ext Rot    Shoulder Flex and Abduction    Shoulder Ext/Scap Retraction    Horizontal Abduction    Bilateral External Rotation    Cybex rows wide and close                    Shoulder Manual x    PROM with End Range Stretch    Capsular Mobilizations     Scapular PNF    Deep Tissue/Myofascial            Shoulder Modalities

## 2024-03-11 ENCOUNTER — CLINICAL SUPPORT (OUTPATIENT)
Dept: REHABILITATION | Facility: HOSPITAL | Age: 84
End: 2024-03-11
Payer: MEDICARE

## 2024-03-11 DIAGNOSIS — S42.291A CLOSED FRACTURE OF HEAD OF HUMERUS, RIGHT, INITIAL ENCOUNTER: Primary | ICD-10-CM

## 2024-03-11 DIAGNOSIS — Z09 FOLLOW-UP EXAMINATION, FOLLOWING OTHER SURGERY: ICD-10-CM

## 2024-03-11 PROCEDURE — 97162 PT EVAL MOD COMPLEX 30 MIN: CPT

## 2024-03-11 PROCEDURE — 97110 THERAPEUTIC EXERCISES: CPT

## 2024-03-11 NOTE — PLAN OF CARE
[facility]   Physical Therapy Initial Evaluation      Name: Tonya Sandoval  Clinic Number: [id number]    Therapy Diagnosis: Closed fracture of head of right humerus, initial encounter     Physician:   Jose Alfredo Poon III, MD       Physician Orders: PT Eval and Treat Right Shoulder  Medical Diagnosis from Referral: Closed fracture of head of right humerus, initial encounter  Evaluation Date: 3/11/2024  Authorization Period Expiration: 3/7/2025  Plan of Care Expiration: 5/10/2024  Progress Note Due: As needed  Visit # / Visits authorized: 1 / 16   FOTO: 33 /100    Precautions: Standard     Time In: 5:00 pm  Time Out: 5:55 pm  Total Appointment Time (timed & untimed codes): 55 minutes    Subjective     Date of onset: 1/1/2024    History of current condition - Tonya reports: being 10 weeks post injury. Patient reports slipping on deck 1/1/2024 and falling onto right shoulder resulting in humeral neck fracture.     MD note states:    History:  1/2/2024   Tonya Sandoval is a 83 y.o.  status post patient fell yesterday went to emergency room right shoulder pain x-rays show an impacted humeral neck fracture she has in a sling we are going to place her in a shoulder immobilizer        HISTORY:   1/18/2024    Tonya Sandoval  is a 83 y.o. 2 week follow-up impacted right humeral neck fracture treated conservatively date of injury 01/01/2024 patient states she is doing better     History:  3/7/2024   Tonya Sandoval is a 83 y.o.  status post follow-up right humeral neck fracture date of injury 01/01/2024 about 9 weeks ago          PE:   She has about 80° forward flexion and abduction has not improved much    Falls: 1/1/2024 fall resulting in right humerus fracture    Imaging:   Radiology:  Right shoulder AP transscapular lateral impacted humeral neck fracture new bone formation healing fracture    Prior Therapy: N/A  Social History:  lives with their daughter  Occupation:  N/A  Prior Level of Function: Prior to fall, patient was active and working in yard with no limitations.  Current Level of Function: Patient is currently unable to functionally use right arm.    Pain:  Current 3/10, worst 8/10, best 0/10   Location: right arm and shoulder    Description: Aching and Sharp  Aggravating Factors: Moving arm  Easing Factors: rest    Patients goals: To regain normal use of right arm     Medical History:   Past Medical History:   Diagnosis Date    Atrial fibrillation     GERD (gastroesophageal reflux disease)     Hyperlipidemia     Hypertension     OA (osteoarthritis)        Surgical History:   Tonya Sandoval  has a past surgical history that includes Cardiac catheterization and Hysterectomy.    Medications:   Tonya has a current medication list which includes the following prescription(s): amoxicillin-clavulanate 875-125mg, aspirin, atorvastatin, ibuprofen, lansoprazole, metoprolol succinate, and olmesartan-hydrochlorothiazide.    Allergies:   Review of patient's allergies indicates:   Allergen Reactions    Codeine     Nickel sutures [surgical stainless steel]         Objective          Observation : Pleasant and cooperative holding right arm in protective position.     Forward Head/Rounded Shoulders :  [x] Yes   [] No   Scapular Winging :  [] Yes   [x] No   Incision :  N/A      Comments : Patient is right hand dominant     Cervical Spine Clearing : WITHIN FUNCTIONAL LIMITS with slight limitation in rotation.        Range of Motion/Strength :                  Left Extremity                                                                        Right Extremity     AROM   PROM  Strength                  Location   AROM    PROM   Strength    WFL WFL  WFL   Shoulder    Flexion  80 110 3-/5                           Abduction  60  85 3-/5                           Scaption                              ER in Adduction 50 60 3+/5                           IR in Adduction waist  3+/5                            Functional IR GT                             ER in 90 Scaption                              IR in 90 Scaption      WFL WFL WFL   Elbow         Flexion   4-/5                           Extension   4-/5                           Pron/Supination      -Scapular stabilizer strength = 4-/5    Functional Impairments : Currently unable to functionally use right arm.       Clinical Tests:  10 weeks status post right humeral neck fracture with date of injury 01/01/2024     Ligamentous Stability : Normal    Rotator Cuff : Intact    Labrum : Intact     Elbow : Mild tightness flexion and extension with supination and pronation most limited    Other :    Palpation :  Lateral aspect of deltoid and humerus           Limitation/Restriction for FOTO Shoulder Survey    Therapist reviewed FOTO scores for Tonya Sandoval on 3/11/2024.   FOTO documents entered into EPIC - see Media section.    Limitation Score: 67%         Treatment     Total Treatment time (time-based codes) separate from Evaluation: 10 minutes       Tonya received the treatments listed below:  THERAPEUTIC EXERCISES to develop strength and ROM for 10 minutes including Pulleys, scapular retractions, table slides, wall walks,       Patient Education and Home Exercises     Education provided:   - PLAN OF CARE  - HOME EXERCISE PROGRAM     Written Home Exercises Provided: yes. Exercises were reviewed and Tonya was able to demonstrate them prior to the end of the session.  Tonya demonstrated good  understanding of the education provided. See EMR under Patient Instructions for exercises provided during therapy sessions.    Assessment     Tonya is a 83 y.o. female referred to outpatient Physical Therapy with a medical diagnosis of Closed fracture of head of right humerus, initial encounter. Patient presents with Right shoulder pain, decreased shoulder motion, abnormal posture, scapular and shoulder weakness. Patient's right shoulder was  immobilized for 9 weeks with patient very limited in flexion and abduction. Patient instructed in home program today with handouts provided. Patient introduced to pulleys with instructions to order for home use. Patient had moderate pain with range of motion but patient tolerated exercises well today. Patient will benefit from skilled Physical Therapy intervention to address all deficits and help patient to return to their prior level of function.      Patient prognosis is Good.   Patient will benefit from skilled outpatient Physical Therapy to address the deficits stated above and in the chart below, provide patient /family education, and to maximize patientt's level of independence.     Plan of care discussed with patient: Yes  Patient's spiritual, cultural and educational needs considered and patient is agreeable to the plan of care and goals as stated below:     Anticipated Barriers for therapy: None    Medical Necessity is demonstrated by the following  History  Co-morbidities and personal factors that may impact the plan of care [] LOW: no personal factors / co-morbidities  [] MODERATE: 1-2 personal factors / co-morbidities  [x] HIGH: 3+ personal factors / co-morbidities    Moderate / High Support Documentation:   Co-morbidities affecting plan of care:    Atrial fibrillation    GERD (gastroesophageal reflux disease)    Hyperlipidemia    Hypertension    OA (osteoarthritis)   past surgical history that includes Cardiac catheterization and Hysterectomy.    Personal Factors:   age     Examination  Body Structures and Functions, activity limitations and participation restrictions that may impact the plan of care [] LOW: addressing 1-2 elements  [] MODERATE: 3+ elements  [x] HIGH: 4+ elements (please support below)    Moderate / High Support Documentation: Right shoulder pain, decreased shoulder motion, abnormal posture, scapular and shoulder weakness     Clinical Presentation [] LOW: stable  [x] MODERATE:  Evolving  [] HIGH: Unstable     Decision Making/ Complexity Score: moderate       Goals :   Short Term Goals : 4 weeks  Independent with Home Exercise Program   Increase Right Shoulder Flexion and Abduction Passive Range of Motion to WITHIN FUNCTIONAL LIMITS   Increase Right Shoulder Internal Rotation and External Rotation Range of Motion to WITHIN FUNCTIONAL LIMITS   Increase Right Scapular Strength to 4+/5   Decrease complaints of Right Shoulder Pain to 4/10 with Activity     Long Term Goals : 8 weeks  1.Patient will perform 30 minutes of Activity with use of Right Shoulder with Pain Less than or Equal to 2/10  2. Increase Active Range of Motion to Within Functional Limits   3. Increase Right Scapular Strength to 5/5   4.  Increase Right Shoulder Strength to 4/5 or greater    Plan     Plan of care Certification: 3/11/2024 to 5/10/2024.    Outpatient Physical Therapy 2 times weekly for 8 weeks to include the following interventions: Electrical Stimulation Pre-Mod, Manual Therapy, Moist Heat/ Ice, Neuromuscular Re-ed, Patient Education, Therapeutic Activities, and Therapeutic Exercise.     MAGDALENA DELCID, PT, MLT

## 2024-03-12 DIAGNOSIS — I10 PRIMARY HYPERTENSION: Primary | ICD-10-CM

## 2024-03-19 ENCOUNTER — CLINICAL SUPPORT (OUTPATIENT)
Dept: REHABILITATION | Facility: HOSPITAL | Age: 84
End: 2024-03-19
Payer: MEDICARE

## 2024-03-19 DIAGNOSIS — S42.291A CLOSED FRACTURE OF HEAD OF HUMERUS, RIGHT, INITIAL ENCOUNTER: Primary | ICD-10-CM

## 2024-03-19 PROCEDURE — 97110 THERAPEUTIC EXERCISES: CPT

## 2024-03-19 PROCEDURE — 97140 MANUAL THERAPY 1/> REGIONS: CPT

## 2024-03-19 PROCEDURE — 97112 NEUROMUSCULAR REEDUCATION: CPT

## 2024-03-19 NOTE — PROGRESS NOTES
[facility]   Physical Therapy Treatment Note     Name: Tonya Sandoval  Clinic Number: [id number]    Visit Date: 3/19/2024    Therapy Diagnosis: Closed fracture of head of right humerus, initial encounter      Physician:   Jose Alfredo Poon III, MD      Physician Orders: PT Eval and Treat Right Shoulder  Medical Diagnosis from Referral: Closed fracture of head of right humerus, initial encounter  Evaluation Date: 3/11/2024  Authorization Period Expiration: 3/7/2025  Plan of Care Expiration: 5/10/2024  Progress Note Due: As needed  Visit # / Visits authorized: 2 / 16   FOTO: 33 /100    Time In: 3:15 pm  Time Out: 4:00 pm  Total Billable Time: 45 minutes    Precautions: Standard  Prior Level of Function: Prior to fall, patient was active and working in yard with no limitations.  Current Level of Function: Patient is currently unable to functionally use right arm.    Subjective     Pt reports: performing home program. Patient complains of stretches out to side are painful.  She was compliant with home exercise program.    Pain: 4/10  Location: right shoulder      Objective       Tonya received therapeutic exercises to develop strength, ROM, flexibility, and posture for 20 minutes including:  Shoulder Exercises         UBE  4 minutes   Pulleys  6 minutes   Corner Stretch   4 x 15 seconds    Cane Flexion on Wall   20x with 3 second hold   Cane Flexion off Wall      Cane behind back  20x   TRX hang outs and walk outs  5 x 10 second hold   Cybex Shoulder Press     Cybex Lat Pull Down     Cybex/Cable Bicep Curls      Cybex/Cable Tricep Press Downs                       Neuro Re-Education x 15 minutes           Shoulder Int Rot and Ext Rot     Shoulder Flex and Abduction     Shoulder Ext/Scap Retraction     Horizontal Abduction  2 x 10 with yellow TB   Bilateral External Rotation  2 x 10 with yellow TB   Cybex rows wide and close  2 x 10 2 plates                           Shoulder  Manual x 10 minutes     PROM with End Range Stretch  x   Capsular Mobilizations   x   Scapular PNF     Deep Tissue/Myofascial                 Shoulder Modalities           Home Exercises Provided and Patient Education Provided     Education provided: HOME EXERCISE PROGRAM review    Written Home Exercises Provided: Patient instructed to cont prior HEP.  Exercises were reviewed and Tonya was able to demonstrate them prior to the end of the session.  Tonya demonstrated good  understanding of the education provided.     See EMR under Patient Instructions for exercises provided prior visit.    Assessment     First treatment since evaluation with PLAN OF CARE initiated today. Patient required moderate cues and demonstration for proper technique. Patient had a lot of pain with moving right shoulder with abduction being the worse at beginning of treatment with some improvement at end of treatment. Encouraged patient to order pulleys which she said that she would this week. Will continue with PLAN OF CARE and progress as tolerated.      Tonya is a 83 y.o. female referred to outpatient Physical Therapy with a medical diagnosis of Closed fracture of head of right humerus, initial encounter. Patient presents with Right shoulder pain, decreased shoulder motion, abnormal posture, scapular and shoulder weakness. Patient's right shoulder was immobilized for 9 weeks with patient very limited in flexion and abduction. Patient instructed in home program today with handouts provided. Patient introduced to pulleys with instructions to order for home use. Patient had moderate pain with range of motion but patient tolerated exercises well today. Patient will benefit from skilled Physical Therapy intervention to address all deficits and help patient to return to their prior level of function.      Tonya Is progressing well towards her goals.   Pt prognosis is Good.     Pt will continue to benefit from skilled outpatient physical  therapy to address the deficits listed in the problem list box on initial evaluation, provide pt/family education and to maximize pt's level of independence in the home and community environment.     Pt's spiritual, cultural and educational needs considered and pt agreeable to plan of care and goals.     Anticipated barriers to physical therapy: None    Goals :   Short Term Goals : 4 weeks  Independent with Home Exercise Program   Increase Right Shoulder Flexion and Abduction Passive Range of Motion to WITHIN FUNCTIONAL LIMITS   Increase Right Shoulder Internal Rotation and External Rotation Range of Motion to WITHIN FUNCTIONAL LIMITS   Increase Right Scapular Strength to 4+/5   Decrease complaints of Right Shoulder Pain to 4/10 with Activity      Long Term Goals : 8 weeks  1.Patient will perform 30 minutes of Activity with use of Right Shoulder with Pain Less than or Equal to 2/10  2. Increase Active Range of Motion to Within Functional Limits   3. Increase Right Scapular Strength to 5/5   4.  Increase Right Shoulder Strength to 4/5 or greater    Plan     Plan of care Certification: 3/11/2024 to 5/10/2024.     Outpatient Physical Therapy 2 times weekly for 8 weeks to include the following interventions: Electrical Stimulation Pre-Mod, Manual Therapy, Moist Heat/ Ice, Neuromuscular Re-ed, Patient Education, Therapeutic Activities, and Therapeutic Exercise.     MAGDALENA DELCID, PT, MLT  3/19/2024

## 2024-03-20 ENCOUNTER — OFFICE VISIT (OUTPATIENT)
Dept: FAMILY MEDICINE | Facility: CLINIC | Age: 84
End: 2024-03-20
Payer: MEDICARE

## 2024-03-20 VITALS
RESPIRATION RATE: 16 BRPM | DIASTOLIC BLOOD PRESSURE: 68 MMHG | OXYGEN SATURATION: 96 % | WEIGHT: 199 LBS | HEIGHT: 69 IN | SYSTOLIC BLOOD PRESSURE: 126 MMHG | BODY MASS INDEX: 29.47 KG/M2 | TEMPERATURE: 98 F | HEART RATE: 69 BPM

## 2024-03-20 DIAGNOSIS — E78.5 HYPERLIPIDEMIA, UNSPECIFIED HYPERLIPIDEMIA TYPE: Primary | ICD-10-CM

## 2024-03-20 DIAGNOSIS — F41.9 ANXIETY: ICD-10-CM

## 2024-03-20 DIAGNOSIS — I10 PRIMARY HYPERTENSION: ICD-10-CM

## 2024-03-20 DIAGNOSIS — I48.19 PERSISTENT ATRIAL FIBRILLATION: ICD-10-CM

## 2024-03-20 PROCEDURE — 99214 OFFICE O/P EST MOD 30 MIN: CPT | Mod: ,,, | Performed by: FAMILY MEDICINE

## 2024-03-20 RX ORDER — ALPRAZOLAM 0.25 MG/1
TABLET ORAL 3 TIMES DAILY
COMMUNITY
End: 2024-03-20 | Stop reason: SDUPTHER

## 2024-03-20 RX ORDER — ALPRAZOLAM 0.25 MG/1
0.25 TABLET ORAL 3 TIMES DAILY
Qty: 30 TABLET | Refills: 5 | Status: SHIPPED | OUTPATIENT
Start: 2024-03-20

## 2024-03-20 NOTE — PROGRESS NOTES
Noe Haile MD        PATIENT NAME: Tonya Sandoval  : 1940  DATE: 3/20/24  MRN: [id number]      Billing Provider: Neo Haile MD  Level of Service: MD OFFICE/OUTPT VISIT, EST, LEVL IV, 30-39 MIN  Patient PCP Information       Provider PCP Type    Noe Haile MD General            Reason for Visit / Chief Complaint: Hypertension (6 month check)       Update PCP  Update Chief Complaint         History of Present Illness / Problem Focused Workflow     Tonya Sandoval presents to the clinic with Hypertension (6 month check)     Routine followup.  No significant interval change.   Had fx R humerus at surgical neck treated conservatively 24 and now in PT    Hypertension  Pertinent negatives include no chest pain, headaches, neck pain or palpitations.       Review of Systems     Review of Systems   Constitutional:  Negative for activity change, appetite change, fever and unexpected weight change.   HENT:  Negative for congestion, rhinorrhea, sinus pressure, sinus pain, sore throat and trouble swallowing.    Eyes:  Negative for photophobia, pain, discharge and visual disturbance.   Respiratory:  Negative for cough, chest tightness, wheezing and stridor.    Cardiovascular:  Negative for chest pain, palpitations and leg swelling.   Gastrointestinal:  Negative for abdominal pain, blood in stool, constipation, diarrhea and nausea.   Endocrine: Negative for polydipsia, polyphagia and polyuria.   Genitourinary:  Negative for difficulty urinating, flank pain and hematuria.   Musculoskeletal:  Positive for arthralgias. Negative for neck pain.   Skin:  Negative for rash.   Allergic/Immunologic: Negative for food allergies.   Neurological:  Negative for dizziness, tremors, seizures, syncope, weakness (global weakness) and headaches.   Psychiatric/Behavioral:  Negative for behavioral problems, confusion, decreased concentration, dysphoric mood and hallucinations. The patient is not  nervous/anxious.         Medical / Social / Family History     Past Medical History:   Diagnosis Date    Atrial fibrillation     GERD (gastroesophageal reflux disease)     Hyperlipidemia     Hypertension     OA (osteoarthritis)        Past Surgical History:   Procedure Laterality Date    CARDIAC CATHETERIZATION      HYSTERECTOMY         Social History  Ms.  reports that she has never smoked. She has never been exposed to tobacco smoke. She has never used smokeless tobacco. She reports that she does not drink alcohol and does not use drugs.    Family History  Ms.'s family history includes Brain cancer in her father; Cancer in her sister; Heart disease in her brother.    Medications and Allergies     Medications  No outpatient medications have been marked as taking for the 3/20/24 encounter (Office Visit) with Noe Haile MD.       Allergies  Review of patient's allergies indicates:   Allergen Reactions    Codeine     Nickel sutures [surgical stainless steel]        Physical Examination     Vitals:    03/20/24 1053   BP: 126/68   Pulse: 69   Resp: 16   Temp: 97.9 °F (36.6 °C)     Physical Exam  Constitutional:       General: She is not in acute distress.     Appearance: Normal appearance.   HENT:      Head: Normocephalic.      Right Ear: Tympanic membrane and ear canal normal.      Left Ear: Tympanic membrane and ear canal normal.      Nose: Nose normal.      Mouth/Throat:      Mouth: Mucous membranes are moist.      Pharynx: No oropharyngeal exudate.   Eyes:      Extraocular Movements: Extraocular movements intact.      Pupils: Pupils are equal, round, and reactive to light.   Cardiovascular:      Rate and Rhythm: Normal rate and regular rhythm.      Heart sounds: No murmur heard.  Pulmonary:      Effort: Pulmonary effort is normal.      Breath sounds: Normal breath sounds. No wheezing.   Abdominal:      General: Abdomen is flat. Bowel sounds are normal.      Palpations: Abdomen is soft.      Hernia: No hernia  is present.   Musculoskeletal:         General: Normal range of motion.      Cervical back: Normal range of motion and neck supple.      Right lower leg: No edema.      Left lower leg: No edema.   Lymphadenopathy:      Cervical: No cervical adenopathy.   Skin:     General: Skin is warm and dry.      Coloration: Skin is not jaundiced.      Findings: No lesion.   Neurological:      General: No focal deficit present.      Mental Status: She is alert and oriented to person, place, and time.      Cranial Nerves: No cranial nerve deficit.      Gait: Gait normal.   Psychiatric:         Mood and Affect: Mood normal.         Behavior: Behavior normal.         Judgment: Judgment normal.          Assessment and Plan (including Health Maintenance)      Problem List  Smart Sets  Document Outside HM   :    Plan:     1. Persistent atrial fibrillation  The current medical regimen is effective;  continue present plan and medications.  Overview:  Patient had gyno bleeding while on Xarelto and has declined to try other anticoagulants.             Health Maintenance Due   Topic Date Due    TETANUS VACCINE  Never done    DEXA Scan  Never done    Shingles Vaccine (1 of 2) Never done    RSV Vaccine (Age 60+ and Pregnant patients) (1 - 1-dose 60+ series) Never done    Pneumococcal Vaccines (Age 65+) (2 of 2 - PPSV23 or PCV20) 02/07/2018    Influenza Vaccine (1) 09/01/2023    COVID-19 Vaccine (5 - 2023-24 season) 09/01/2023       1. Hyperlipidemia, unspecified hyperlipidemia type    2. Persistent atrial fibrillation  Overview:  Patient had gyno bleeding while on Xarelto and has declined to try other anticoagulants.       3. Primary hypertension         Health Maintenance Topics with due status: Not Due       Topic Last Completion Date    Lipid Panel 03/15/2024       Future Appointments   Date Time Provider Department Center   3/21/2024  1:45 PM Ángel Jang PTA [facility]   3/25/2024  5:30 PM Aron Disla, PT [facility]   3/28/2024  5:30 PM Eaves, Aron L, PT [facility]   4/1/2024  5:30 PM Eaves, Aron L, PT [facility]   4/3/2024  5:30 PM Eaves, Aron L, PT [facility]   4/8/2024  5:30 PM Eaves, Aron L, PT [facility]   4/9/2024 10:10 AM Jose Alfreod Poon III, MD [facility]   4/10/2024  5:30 PM Eaves, Aron L, PT [facility]   4/15/2024  5:30 PM Eaves, Aron L, PT [facility]   4/17/2024  5:30 PM Eaves, Aron L, PT [facility]   4/22/2024  5:30 PM Eaves, Aron L, PT [facility]   4/24/2024  5:30 PM Eaves, Aron L, PT [facility]   4/29/2024  5:30 PM Eaves, Aron L, PT [facility]   5/1/2024  5:30 PM Eaves, Aron L, PT [facility]   5/6/2024  5:30 PM Eaves, Aron L, PT [facility]   5/9/2024  5:30 PM Eaves, Aron L, PT [facility]        There are no Patient Instructions on file for this visit.  Follow up in about 6 months (around 9/20/2024) for routine followup.     Signature:  Noe Haile MD      Date of encounter: 3/20/24

## 2024-03-21 ENCOUNTER — CLINICAL SUPPORT (OUTPATIENT)
Dept: REHABILITATION | Facility: HOSPITAL | Age: 84
End: 2024-03-21
Payer: MEDICARE

## 2024-03-21 DIAGNOSIS — S42.291A CLOSED FRACTURE OF HEAD OF HUMERUS, RIGHT, INITIAL ENCOUNTER: Primary | ICD-10-CM

## 2024-03-21 PROCEDURE — 97112 NEUROMUSCULAR REEDUCATION: CPT | Mod: CQ

## 2024-03-21 PROCEDURE — 97110 THERAPEUTIC EXERCISES: CPT | Mod: CQ

## 2024-03-21 PROCEDURE — 97140 MANUAL THERAPY 1/> REGIONS: CPT | Mod: CQ

## 2024-03-21 NOTE — PROGRESS NOTES
[facility]   Physical Therapy Treatment Note     Name: Tonya Sandoval  Clinic Number: [id number]    Visit Date: 3/21/2024    Therapy Diagnosis: Closed fracture of head of right humerus, initial encounter      Physician:   Jose Alfredo Poon III, MD      Physician Orders: PT Eval and Treat Right Shoulder  Medical Diagnosis from Referral: Closed fracture of head of right humerus, initial encounter  Evaluation Date: 3/11/2024  Authorization Period Expiration: 3/7/2025  Plan of Care Expiration: 5/10/2024  Progress Note Due: As needed  Visit # / Visits authorized: 3 / 16   FOTO: 33 /100  PTA Visit: 1/5    Time In: 1:44 pm  Time Out: 2:23 pm  Total Billable Time: 39 minutes    Precautions: Standard  Prior Level of Function: Prior to fall, patient was active and working in yard with no limitations.  Current Level of Function: Patient is currently unable to functionally use right arm.    Subjective     Pt reports: performing home program. Patient complains of stretches out to side are painful.  She was compliant with home exercise program.    Pain: 4/10  Location: right shoulder      Objective       Tonya received therapeutic exercises to develop strength, ROM, flexibility, and posture for 18 minutes including:  Shoulder Exercises         UBE  3 minutes   Pulleys  3 minutes   Corner Stretch   4 x 15 seconds    Cane Flexion on Wall   20x with 3 second hold   Swiss Ball Rolls into Flexion  10x10 sec hold   Cane behind back  20x   TRX hang outs and walk outs  5 x 10 second hold   Cybex Shoulder Press     Cybex Lat Pull Down     Cybex/Cable Bicep Curls      Cybex/Cable Tricep Press Downs                       Neuro Re-Education x 12 minutes           Shoulder Int Rot and Ext Rot     Shoulder Flex and Abduction     Shoulder Ext/Scap Retraction  green band 30x   Horizontal Abduction  2 x 10 with red TB   Bilateral External Rotation  2 x 10 with red TB   Cybex rows wide and close  2 x 10 2  plates                           Shoulder Manual x 9 minutes     PROM with End Range Stretch  x   Capsular Mobilizations   x   Scapular PNF     Deep Tissue/Myofascial                 Shoulder Modalities           Home Exercises Provided and Patient Education Provided     Education provided: HOME EXERCISE PROGRAM review    Written Home Exercises Provided: Patient instructed to cont prior HEP.  Exercises were reviewed and Tonya was able to demonstrate them prior to the end of the session.  Tonya demonstrated good  understanding of the education provided.     See EMR under Patient Instructions for exercises provided prior visit.    Assessment     Mild loss of AROM. RANGE OF MOTION is tight at end range flexion and ER with guarding present. Progressed light cuff and scapular strengthening exercises with fatigue noted. Pt does well with these exercises. Pt has ordered her pulleys & educated pt on using them diligently. Will continue to progress as able.     PMH:  Tonya is a 83 y.o. female referred to outpatient Physical Therapy with a medical diagnosis of Closed fracture of head of right humerus, initial encounter. Patient presents with Right shoulder pain, decreased shoulder motion, abnormal posture, scapular and shoulder weakness. Patient's right shoulder was immobilized for 9 weeks with patient very limited in flexion and abduction. Patient instructed in home program today with handouts provided. Patient introduced to pulleys with instructions to order for home use. Patient had moderate pain with range of motion but patient tolerated exercises well today. Patient will benefit from skilled Physical Therapy intervention to address all deficits and help patient to return to their prior level of function.      Tonya Is progressing well towards her goals.   Pt prognosis is Good.     Pt will continue to benefit from skilled outpatient physical therapy to address the deficits listed in the problem list box on initial  evaluation, provide pt/family education and to maximize pt's level of independence in the home and community environment.     Pt's spiritual, cultural and educational needs considered and pt agreeable to plan of care and goals.     Anticipated barriers to physical therapy: None    Goals :   Short Term Goals : 4 weeks  Independent with Home Exercise Program   Increase Right Shoulder Flexion and Abduction Passive Range of Motion to WITHIN FUNCTIONAL LIMITS   Increase Right Shoulder Internal Rotation and External Rotation Range of Motion to WITHIN FUNCTIONAL LIMITS   Increase Right Scapular Strength to 4+/5   Decrease complaints of Right Shoulder Pain to 4/10 with Activity      Long Term Goals : 8 weeks  1.Patient will perform 30 minutes of Activity with use of Right Shoulder with Pain Less than or Equal to 2/10  2. Increase Active Range of Motion to Within Functional Limits   3. Increase Right Scapular Strength to 5/5   4.  Increase Right Shoulder Strength to 4/5 or greater    Plan     Plan of care Certification: 3/11/2024 to 5/10/2024.     Outpatient Physical Therapy 2 times weekly for 8 weeks to include the following interventions: Electrical Stimulation Pre-Mod, Manual Therapy, Moist Heat/ Ice, Neuromuscular Re-ed, Patient Education, Therapeutic Activities, and Therapeutic Exercise.     Ángel Jang, PTA  3/21/2024

## 2024-03-25 ENCOUNTER — CLINICAL SUPPORT (OUTPATIENT)
Dept: REHABILITATION | Facility: HOSPITAL | Age: 84
End: 2024-03-25
Payer: MEDICARE

## 2024-03-25 DIAGNOSIS — S42.291A CLOSED FRACTURE OF HEAD OF HUMERUS, RIGHT, INITIAL ENCOUNTER: Primary | ICD-10-CM

## 2024-03-25 PROCEDURE — 97140 MANUAL THERAPY 1/> REGIONS: CPT | Mod: CQ

## 2024-03-25 PROCEDURE — 97112 NEUROMUSCULAR REEDUCATION: CPT | Mod: CQ

## 2024-03-25 PROCEDURE — 97110 THERAPEUTIC EXERCISES: CPT | Mod: CQ

## 2024-03-25 NOTE — PROGRESS NOTES
[facility]   Physical Therapy Treatment Note     Name: Tonya Sandoval  Clinic Number: [id number]    Visit Date: 3/25/2024    Therapy Diagnosis: Closed fracture of head of right humerus, initial encounter      Physician:   Jose Alfredo Poon III, MD      Physician Orders: PT Eval and Treat Right Shoulder  Medical Diagnosis from Referral: Closed fracture of head of right humerus, initial encounter  Evaluation Date: 3/11/2024  Authorization Period Expiration: 3/7/2025  Plan of Care Expiration: 5/10/2024  Progress Note Due: As needed  Visit # / Visits authorized: 4 / 16   FOTO: 33 /100  PTA Visit: 2/5    Time In: 1655  Time Out: 1758  Total Billable Time: 60 minutes    Precautions: Standard  Prior Level of Function: Prior to fall, patient was active and working in yard with no limitations.  Current Level of Function: Patient is currently unable to functionally use right arm.    Subjective     Pt reports: performing home program. Patient had increased pain over weekend, not sure why.  She was compliant with home exercise program.    Pain: 0/10  Location: right shoulder      Objective       Tonya received therapeutic exercises to develop strength, ROM, flexibility, and posture for 18 minutes including:  Shoulder Exercises         UBE  3 minutes   Pulleys  3 minutes   Corner Stretch   4 x 15 seconds    Cane Flexion on Wall   20x with 3 second hold   Swiss Ball Rolls into Flexion  10x10 sec hold   Cane behind back  20x   TRX hang outs and walk outs  5 x 10 second hold   Cybex Shoulder Press     Cybex Lat Pull Down  #1 x 20 with 3 sec hold with assist for scapula rotation   Cybex/Cable Bicep Curls      Cybex/Cable Tricep Press Downs                       Neuro Re-Education x 12 minutes           Shoulder Int Rot and Ext Rot     Shoulder Flex and Abduction     Shoulder Ext/Scap Retraction  green band 30x   Horizontal Abduction  2 x 10 with red TB   Bilateral External Rotation  2 x 10  with red TB   Cybex rows wide and close  2 x 10 2 plates for horz, #3 for vertical                           Shoulder Manual x 9 minutes     PROM with End Range Stretch  x   Capsular Mobilizations   x   Scapular PNF  X   Deep Tissue/Myofascial  X               Shoulder Modalities           Home Exercises Provided and Patient Education Provided     Education provided: HOME EXERCISE PROGRAM review    Written Home Exercises Provided: Patient instructed to cont prior HEP.  Exercises were reviewed and Tonya was able to demonstrate them prior to the end of the session.  Tonya demonstrated good  understanding of the education provided.     See EMR under Patient Instructions for exercises provided prior visit.    Assessment   Case conference with Aron Disla PT for initial PTA visit. Pt lacking upward rotation of scapula and has hiking preventing flexion ROM.    Mild loss of AROM. RANGE OF MOTION is tight at end range flexion and ER with guarding present. Progressed light cuff and scapular strengthening exercises with fatigue noted. Pt does well with these exercises. Pt has recieved her pulleys & educated pt on using them diligently. Will continue to progress as able.     PMH:  Tonya is a 83 y.o. female referred to outpatient Physical Therapy with a medical diagnosis of Closed fracture of head of right humerus, initial encounter. Patient presents with Right shoulder pain, decreased shoulder motion, abnormal posture, scapular and shoulder weakness. Patient's right shoulder was immobilized for 9 weeks with patient very limited in flexion and abduction. Patient instructed in home program today with handouts provided. Patient introduced to pulleys with instructions to order for home use. Patient had moderate pain with range of motion but patient tolerated exercises well today. Patient will benefit from skilled Physical Therapy intervention to address all deficits and help patient to return to their prior level of  function.      Tonya Is progressing well towards her goals.   Pt prognosis is Good.     Pt will continue to benefit from skilled outpatient physical therapy to address the deficits listed in the problem list box on initial evaluation, provide pt/family education and to maximize pt's level of independence in the home and community environment.     Pt's spiritual, cultural and educational needs considered and pt agreeable to plan of care and goals.     Anticipated barriers to physical therapy: None    Goals :   Short Term Goals : 4 weeks  Independent with Home Exercise Program   Increase Right Shoulder Flexion and Abduction Passive Range of Motion to WITHIN FUNCTIONAL LIMITS   Increase Right Shoulder Internal Rotation and External Rotation Range of Motion to WITHIN FUNCTIONAL LIMITS   Increase Right Scapular Strength to 4+/5   Decrease complaints of Right Shoulder Pain to 4/10 with Activity      Long Term Goals : 8 weeks  1.Patient will perform 30 minutes of Activity with use of Right Shoulder with Pain Less than or Equal to 2/10  2. Increase Active Range of Motion to Within Functional Limits   3. Increase Right Scapular Strength to 5/5   4.  Increase Right Shoulder Strength to 4/5 or greater    Plan     Plan of care Certification: 3/11/2024 to 5/10/2024.     Outpatient Physical Therapy 2 times weekly for 8 weeks to include the following interventions: Electrical Stimulation Pre-Mod, Manual Therapy, Moist Heat/ Ice, Neuromuscular Re-ed, Patient Education, Therapeutic Activities, and Therapeutic Exercise.     Latesha Canela, PTA  3/25/2024

## 2024-03-28 ENCOUNTER — CLINICAL SUPPORT (OUTPATIENT)
Dept: REHABILITATION | Facility: HOSPITAL | Age: 84
End: 2024-03-28
Payer: MEDICARE

## 2024-03-28 DIAGNOSIS — S42.291A CLOSED FRACTURE OF HEAD OF HUMERUS, RIGHT, INITIAL ENCOUNTER: Primary | ICD-10-CM

## 2024-03-28 PROCEDURE — 97110 THERAPEUTIC EXERCISES: CPT | Mod: CQ

## 2024-03-28 PROCEDURE — 97112 NEUROMUSCULAR REEDUCATION: CPT | Mod: CQ

## 2024-03-28 NOTE — PROGRESS NOTES
[facility]   Physical Therapy Treatment Note     Name: Tonya Sandoval  Clinic Number: [id number]    Visit Date: 3/28/2024    Therapy Diagnosis: Closed fracture of head of right humerus, initial encounter      Physician:   Jose Alfredo Poon III, MD      Physician Orders: PT Eval and Treat Right Shoulder  Medical Diagnosis from Referral: Closed fracture of head of right humerus, initial encounter  Evaluation Date: 3/11/2024  Authorization Period Expiration: 3/7/2025  Plan of Care Expiration: 5/10/2024  Progress Note Due: As needed  Visit # / Visits authorized: 5 / 16   FOTO: 33 /100  PTA Visit: 3/5    Time In: 1655  Time Out: 1740  Total Billable Time: 45 minutes    Precautions: Standard  Prior Level of Function: Prior to fall, patient was active and working in yard with no limitations.  Current Level of Function: Patient is currently unable to functionally use right arm.    Subjective     Pt reports: performing home program. Patient has discomfort in arm and elbow upon arrival  She was compliant with home exercise program.    Pain: 0/10  Location: right shoulder      Objective       Tonya received therapeutic exercises to develop strength, ROM, flexibility, and posture for 18 minutes including:  Shoulder Exercises         UBE  3 minutes   Pulleys  3 minutes NOT THIS VISIT    Corner Stretch   4 x 15 seconds    Cane Flexion on Wall   20x with 3 second hold   Swiss Ball Rolls into Flexion  10x10 sec hold NOT THIS VISIT    Cane behind back  0x   TRX hang outs and walk outs  5 x 10 second hold NOT THIS VISIT    Cybex Shoulder Press     Cybex Lat Pull Down  #1 x 20 with 3 sec hold with assist for scapula rotation NOT THIS VISIT    Cybex/Cable Bicep Curls      Cybex/Cable Tricep Press Downs  supine 1# x 20                     Neuro Re-Education x 12 minutes           Shoulder Int Rot and Ext Rot  2# x 20 supine 90 degrees abd   Shoulder Flex and Abduction  1# cane x 20   Shoulder  Ext/Scap Retraction  green band 30x   Horizontal Abduction  2 x 10 with red TB   Bilateral External Rotation  2 x 10 with red TB   Cybex rows wide and close  2 x 10  green                           Shoulder Manual x 9 minutes     PROM with End Range Stretch  x   Capsular Mobilizations   x   Scapular PNF  X   Deep Tissue/Myofascial  X               Shoulder Modalities           Home Exercises Provided and Patient Education Provided     Education provided: HOME EXERCISE PROGRAM review    Written Home Exercises Provided: Patient instructed to cont prior HEP.  Exercises were reviewed and Tonya was able to demonstrate them prior to the end of the session.  Tonya demonstrated good  understanding of the education provided.     See EMR under Patient Instructions for exercises provided prior visit.    Assessment   Pt fatigues with exercises today. Has min increased discomfort with shoulder flexion strain. Pt lacking upward rotation of scapula and has hiking preventing flexion ROM.    Mild loss of AROM. RANGE OF MOTION is tight at end range flexion and ER with guarding present. Progressed light cuff and scapular strengthening exercises with fatigue noted. Pt does well with these exercises. Pt has recieved her pulleys & educated pt on using them diligently. Will continue to progress as able.     PMH:  Tonya is a 83 y.o. female referred to outpatient Physical Therapy with a medical diagnosis of Closed fracture of head of right humerus, initial encounter. Patient presents with Right shoulder pain, decreased shoulder motion, abnormal posture, scapular and shoulder weakness. Patient's right shoulder was immobilized for 9 weeks with patient very limited in flexion and abduction. Patient instructed in home program today with handouts provided. Patient introduced to pulleys with instructions to order for home use. Patient had moderate pain with range of motion but patient tolerated exercises well today. Patient will benefit  from skilled Physical Therapy intervention to address all deficits and help patient to return to their prior level of function.      Tonya Is progressing well towards her goals.   Pt prognosis is Good.     Pt will continue to benefit from skilled outpatient physical therapy to address the deficits listed in the problem list box on initial evaluation, provide pt/family education and to maximize pt's level of independence in the home and community environment.     Pt's spiritual, cultural and educational needs considered and pt agreeable to plan of care and goals.     Anticipated barriers to physical therapy: None    Goals :   Short Term Goals : 4 weeks  Independent with Home Exercise Program   Increase Right Shoulder Flexion and Abduction Passive Range of Motion to WITHIN FUNCTIONAL LIMITS   Increase Right Shoulder Internal Rotation and External Rotation Range of Motion to WITHIN FUNCTIONAL LIMITS   Increase Right Scapular Strength to 4+/5   Decrease complaints of Right Shoulder Pain to 4/10 with Activity      Long Term Goals : 8 weeks  1.Patient will perform 30 minutes of Activity with use of Right Shoulder with Pain Less than or Equal to 2/10  2. Increase Active Range of Motion to Within Functional Limits   3. Increase Right Scapular Strength to 5/5   4.  Increase Right Shoulder Strength to 4/5 or greater    Plan     Plan of care Certification: 3/11/2024 to 5/10/2024.     Outpatient Physical Therapy 2 times weekly for 8 weeks to include the following interventions: Electrical Stimulation Pre-Mod, Manual Therapy, Moist Heat/ Ice, Neuromuscular Re-ed, Patient Education, Therapeutic Activities, and Therapeutic Exercise.     Latesha Canela, PTA  3/28/2024

## 2024-04-01 ENCOUNTER — CLINICAL SUPPORT (OUTPATIENT)
Dept: REHABILITATION | Facility: HOSPITAL | Age: 84
End: 2024-04-01
Payer: MEDICARE

## 2024-04-01 DIAGNOSIS — S42.291A CLOSED FRACTURE OF HEAD OF HUMERUS, RIGHT, INITIAL ENCOUNTER: Primary | ICD-10-CM

## 2024-04-01 PROCEDURE — 97112 NEUROMUSCULAR REEDUCATION: CPT

## 2024-04-01 PROCEDURE — 97110 THERAPEUTIC EXERCISES: CPT

## 2024-04-01 NOTE — PROGRESS NOTES
[facility]   Physical Therapy Treatment Note     Name: Tonya Sandoval  Clinic Number: [id number]    Visit Date: 4/1/2024    Therapy Diagnosis: Closed fracture of head of right humerus, initial encounter      Physician:   Jose Alfredo Poon III, MD      Physician Orders: PT Eval and Treat Right Shoulder  Medical Diagnosis from Referral: Closed fracture of head of right humerus, initial encounter  Evaluation Date: 3/11/2024  Authorization Period Expiration: 3/7/2025  Plan of Care Expiration: 5/10/2024  Progress Note Due: As needed  Visit # / Visits authorized: 6 / 16   FOTO: 33 /100  PTA Visit: 3/5    Time In: 5:30 pm  Time Out: 6:15 pm  Total Billable Time: 45 minutes    Precautions: Standard  Prior Level of Function: Prior to fall, patient was active and working in yard with no limitations.  Current Level of Function: Patient is currently unable to functionally use right arm.    Subjective     Pt reports: that arm has been aching today and thinks that she may have slept on it last night.  She was compliant with home exercise program.    Pain: 5/10  Location: right shoulder      Objective       Tonya received therapeutic exercises to develop strength, ROM, flexibility, and posture for 22 minutes including:  Shoulder Exercises         UBE  4 minutes   Pulleys  3 minutes    Corner Stretch   4 x 15 seconds    Cane Flexion on Wall   20x with 3 second hold   Cane flexion off wall   2 x 10 2#   Cane behind back  20x   TRX hang outs and walk outs  5 x 10 second hold    Cybex Shoulder Press  2 x 10 1 plate   Cybex Lat Pull Down  #1 x 20 with 3 sec hold with assist for scapula rotation    Cybex/Cable Bicep Curls      Cybex/Cable Tricep Press Downs                      Neuro Re-Education x 23 minutes           Shoulder Int Rot and Ext Rot  2 x 10 green tubing   Shoulder Flex and Abduction  2 x 10 red TB   Shoulder Ext/Scap Retraction  2 x 10 blue tubing   Horizontal Abduction  2 x 10  with red TB   Bilateral External Rotation  2 x 10 with red TB   Cybex rows wide and close  2 x 10  3 plates                           Shoulder Manual x 0 minutes     PROM with End Range Stretch  x   Capsular Mobilizations   x   Scapular PNF  X   Deep Tissue/Myofascial  X               Shoulder Modalities           Home Exercises Provided and Patient Education Provided     Education provided: HOME EXERCISE PROGRAM review    Written Home Exercises Provided: Patient instructed to cont prior HEP.  Exercises were reviewed and Tonya was able to demonstrate them prior to the end of the session.  Tonya demonstrated good  understanding of the education provided.     See EMR under Patient Instructions for exercises provided prior visit.    Assessment   ACTIVE RANGE OF MOTION: Flexion= 120 degrees, Abduction = 90 degrees, Internal rotation= L5 and External rotation= 70 degrees    Patient demonstrates improving functional motion. Increased weight on Cybex rows along with adding shoulder press and additional RC and scapular stabilizer strengthening. Will continue to progress patient as tolerated.           PMH:  Tonya is a 83 y.o. female referred to outpatient Physical Therapy with a medical diagnosis of Closed fracture of head of right humerus, initial encounter. Patient presents with Right shoulder pain, decreased shoulder motion, abnormal posture, scapular and shoulder weakness. Patient's right shoulder was immobilized for 9 weeks with patient very limited in flexion and abduction. Patient instructed in home program today with handouts provided. Patient introduced to pulleys with instructions to order for home use. Patient had moderate pain with range of motion but patient tolerated exercises well today. Patient will benefit from skilled Physical Therapy intervention to address all deficits and help patient to return to their prior level of function.      Tonya Is progressing well towards her goals.   Pt prognosis is  Good.     Pt will continue to benefit from skilled outpatient physical therapy to address the deficits listed in the problem list box on initial evaluation, provide pt/family education and to maximize pt's level of independence in the home and community environment.     Pt's spiritual, cultural and educational needs considered and pt agreeable to plan of care and goals.     Anticipated barriers to physical therapy: None    Goals :   Short Term Goals : 4 weeks  Independent with Home Exercise Program   Increase Right Shoulder Flexion and Abduction Passive Range of Motion to WITHIN FUNCTIONAL LIMITS   Increase Right Shoulder Internal Rotation and External Rotation Range of Motion to WITHIN FUNCTIONAL LIMITS   Increase Right Scapular Strength to 4+/5   Decrease complaints of Right Shoulder Pain to 4/10 with Activity      Long Term Goals : 8 weeks  1.Patient will perform 30 minutes of Activity with use of Right Shoulder with Pain Less than or Equal to 2/10  2. Increase Active Range of Motion to Within Functional Limits   3. Increase Right Scapular Strength to 5/5   4.  Increase Right Shoulder Strength to 4/5 or greater    Plan     Plan of care Certification: 3/11/2024 to 5/10/2024.     Outpatient Physical Therapy 2 times weekly for 8 weeks to include the following interventions: Electrical Stimulation Pre-Mod, Manual Therapy, Moist Heat/ Ice, Neuromuscular Re-ed, Patient Education, Therapeutic Activities, and Therapeutic Exercise.     MAGDALENA DELCID, PT  4/1/2024

## 2024-04-08 ENCOUNTER — CLINICAL SUPPORT (OUTPATIENT)
Dept: REHABILITATION | Facility: HOSPITAL | Age: 84
End: 2024-04-08
Payer: MEDICARE

## 2024-04-08 DIAGNOSIS — S42.291A CLOSED FRACTURE OF HEAD OF HUMERUS, RIGHT, INITIAL ENCOUNTER: Primary | ICD-10-CM

## 2024-04-08 DIAGNOSIS — S42.214A CLOSED NONDISPLACED FRACTURE OF SURGICAL NECK OF RIGHT HUMERUS, UNSPECIFIED FRACTURE MORPHOLOGY, INITIAL ENCOUNTER: Primary | ICD-10-CM

## 2024-04-08 PROCEDURE — 97112 NEUROMUSCULAR REEDUCATION: CPT

## 2024-04-08 PROCEDURE — 97110 THERAPEUTIC EXERCISES: CPT

## 2024-04-08 NOTE — PROGRESS NOTES
[facility]   Physical Therapy Treatment Note     Name: Tonya Sandoval  Clinic Number: [id number]    Visit Date: 4/8/2024    Therapy Diagnosis: Closed fracture of head of right humerus, initial encounter      Physician:   Jose Alfredo Poon III, MD      Physician Orders: PT Eval and Treat Right Shoulder  Medical Diagnosis from Referral: Closed fracture of head of right humerus, initial encounter  Evaluation Date: 3/11/2024  Authorization Period Expiration: 3/7/2025  Plan of Care Expiration: 5/10/2024  Progress Note Due: As needed  Visit # / Visits authorized: 7 / 16   FOTO: 33 /100  PTA Visit: 3/5    Time In: 5:20 pm  Time Out: 6:05 pm  Total Billable Time: 45 minutes    Precautions: Standard  Prior Level of Function: Prior to fall, patient was active and working in yard with no limitations.  Current Level of Function: Patient is currently unable to functionally use right arm.    Subjective     Pt reports: that she missed last week due to stomach virus  She was compliant with home exercise program.    Pain: 5/10  Location: right shoulder      Objective       Tonya received therapeutic exercises to develop strength, ROM, flexibility, and posture for 22 minutes including:  Shoulder Exercises         UBE  4 minutes   Pulleys  3 minutes    Corner Stretch   4 x 15 seconds    Cane Flexion on Wall   20x with 3 second hold   Cane flexion off wall   2 x 10 2#   Cane behind back  20x   TRX hang outs and walk outs  5 x 10 second hold    Cybex Shoulder Press  2 x 10 1 plate   Cybex Lat Pull Down  #1 x 20 with 3 sec hold with assist for scapula rotation    Cybex/Cable Bicep Curls      Cybex/Cable Tricep Press Downs                      Neuro Re-Education x 23 minutes           Shoulder Int Rot and Ext Rot  2 x 10 green tubing   Shoulder Flex and Abduction  2 x 10 red TB   Shoulder Ext/Scap Retraction  2 x 10 blue tubing   Horizontal Abduction  2 x 10 with green TB   Bilateral External  Rotation  2 x 10 with green TB   Cybex rows wide and close  2 x 10  3 plates                           Shoulder Manual x 0 minutes     PROM with End Range Stretch  x   Capsular Mobilizations   x   Scapular PNF  X   Deep Tissue/Myofascial  X               Shoulder Modalities           Home Exercises Provided and Patient Education Provided     Education provided: HOME EXERCISE PROGRAM review    Written Home Exercises Provided: Patient instructed to cont prior HEP.  Exercises were reviewed and Tonya was able to demonstrate them prior to the end of the session.  Tonay demonstrated good  understanding of the education provided.     See EMR under Patient Instructions for exercises provided prior visit.    Assessment   ACTIVE RANGE OF MOTION: Flexion= 120 degrees, Abduction = 90 degrees, Internal rotation= L5 and External rotation= 70 degrees    Increased the resistance of bands today for scapular strengthening. Patient is making slow, steady progress with functional motion. Working to improve deltoid strength so patient can perform functional activities above shoulder height. Will continue with PLAN OF CARE and progress as tolerated.        PMH:  Tonya is a 83 y.o. female referred to outpatient Physical Therapy with a medical diagnosis of Closed fracture of head of right humerus, initial encounter. Patient presents with Right shoulder pain, decreased shoulder motion, abnormal posture, scapular and shoulder weakness. Patient's right shoulder was immobilized for 9 weeks with patient very limited in flexion and abduction. Patient instructed in home program today with handouts provided. Patient introduced to pulleys with instructions to order for home use. Patient had moderate pain with range of motion but patient tolerated exercises well today. Patient will benefit from skilled Physical Therapy intervention to address all deficits and help patient to return to their prior level of function.      Tonya Is progressing  well towards her goals.   Pt prognosis is Good.     Pt will continue to benefit from skilled outpatient physical therapy to address the deficits listed in the problem list box on initial evaluation, provide pt/family education and to maximize pt's level of independence in the home and community environment.     Pt's spiritual, cultural and educational needs considered and pt agreeable to plan of care and goals.     Anticipated barriers to physical therapy: None    Goals :   Short Term Goals : 4 weeks  Independent with Home Exercise Program   Increase Right Shoulder Flexion and Abduction Passive Range of Motion to WITHIN FUNCTIONAL LIMITS   Increase Right Shoulder Internal Rotation and External Rotation Range of Motion to WITHIN FUNCTIONAL LIMITS   Increase Right Scapular Strength to 4+/5   Decrease complaints of Right Shoulder Pain to 4/10 with Activity      Long Term Goals : 8 weeks  1.Patient will perform 30 minutes of Activity with use of Right Shoulder with Pain Less than or Equal to 2/10  2. Increase Active Range of Motion to Within Functional Limits   3. Increase Right Scapular Strength to 5/5   4.  Increase Right Shoulder Strength to 4/5 or greater    Plan     Plan of care Certification: 3/11/2024 to 5/10/2024.     Outpatient Physical Therapy 2 times weekly for 8 weeks to include the following interventions: Electrical Stimulation Pre-Mod, Manual Therapy, Moist Heat/ Ice, Neuromuscular Re-ed, Patient Education, Therapeutic Activities, and Therapeutic Exercise.     MAGDALENA DELCID, PT  4/8/2024

## 2024-04-09 ENCOUNTER — OFFICE VISIT (OUTPATIENT)
Dept: ORTHOPEDICS | Facility: CLINIC | Age: 84
End: 2024-04-09
Payer: MEDICARE

## 2024-04-09 ENCOUNTER — HOSPITAL ENCOUNTER (OUTPATIENT)
Dept: RADIOLOGY | Facility: HOSPITAL | Age: 84
Discharge: HOME | End: 2024-04-09
Attending: ORTHOPAEDIC SURGERY
Payer: MEDICARE

## 2024-04-09 DIAGNOSIS — S42.214A CLOSED NONDISPLACED FRACTURE OF SURGICAL NECK OF RIGHT HUMERUS, UNSPECIFIED FRACTURE MORPHOLOGY, INITIAL ENCOUNTER: ICD-10-CM

## 2024-04-09 DIAGNOSIS — Z09 FOLLOW-UP EXAMINATION, FOLLOWING OTHER SURGERY: Primary | ICD-10-CM

## 2024-04-09 PROCEDURE — 73030 X-RAY EXAM OF SHOULDER: CPT | Mod: 26,RT,, | Performed by: ORTHOPAEDIC SURGERY

## 2024-04-09 PROCEDURE — 99213 OFFICE O/P EST LOW 20 MIN: CPT | Mod: PBBFAC,25 | Performed by: ORTHOPAEDIC SURGERY

## 2024-04-09 PROCEDURE — 99024 POSTOP FOLLOW-UP VISIT: CPT | Mod: ,,, | Performed by: ORTHOPAEDIC SURGERY

## 2024-04-09 PROCEDURE — 73030 X-RAY EXAM OF SHOULDER: CPT | Mod: TC,RT

## 2024-04-09 NOTE — PROGRESS NOTES
CC:    Chief Complaint   Patient presents with    Follow-up     RT HUMERAL FX DOI 1/1 ([facility])           Previos History :  History:  3/7/2024   Tonya Sandoval is a 83 y.o.  status post follow-up right humeral neck fracture date of injury 01/01/2024 about 9 weeks ago        History:  4/9/2024   Tonya Sandoval is a 83 y.o.  status post follow-up right humeral neck fracture 01/01/2024 2 months out says she is feeling better she is in formal therapy        PE:   She has about 120° forward flexion      Radiology:  Right shoulder AP transscapular lateral impacted humeral neck fracture excellent alignment        Ass/Plan:  Neurovascularly intact    Motion stretching she is in therapy just check on her in a month we will x-ray but check her motion mainly she is pleased    Jose Alfredo Poon III, MD    Subject to voice recognition errors,  transcription services are not allowed

## 2024-04-17 ENCOUNTER — CLINICAL SUPPORT (OUTPATIENT)
Dept: REHABILITATION | Facility: HOSPITAL | Age: 84
End: 2024-04-17
Payer: MEDICARE

## 2024-04-17 DIAGNOSIS — S42.291A CLOSED FRACTURE OF HEAD OF HUMERUS, RIGHT, INITIAL ENCOUNTER: Primary | ICD-10-CM

## 2024-04-17 PROCEDURE — 97110 THERAPEUTIC EXERCISES: CPT

## 2024-04-17 PROCEDURE — 97112 NEUROMUSCULAR REEDUCATION: CPT

## 2024-04-17 NOTE — PROGRESS NOTES
[facility]   Physical Therapy Treatment Note     Name: Tonya Sandoval  Clinic Number: [id number]    Visit Date: 4/17/2024    Therapy Diagnosis: Closed fracture of head of right humerus, initial encounter      Physician:   Jose Alfredo Poon III, MD      Physician Orders: PT Eval and Treat Right Shoulder  Medical Diagnosis from Referral: Closed fracture of head of right humerus, initial encounter  Evaluation Date: 3/11/2024  Authorization Period Expiration: 3/7/2025  Plan of Care Expiration: 5/10/2024  Progress Note Due: As needed  Visit # / Visits authorized: 8 / 16   FOTO: 33 /100  PTA Visit: 3/5    Time In: 8:25 am  Time Out: 9:05 am  Total Billable Time: 40 minutes    Precautions: Standard  Prior Level of Function: Prior to fall, patient was active and working in yard with no limitations.  Current Level of Function: Patient is currently unable to functionally use right arm.    Subjective     Pt reports: Increased soreness yesterday after doing a lot of housework and her home program.  She was compliant with home exercise program.    Pain: 3/10  Location: right shoulder      Objective       Tonya received therapeutic exercises to develop strength, ROM, flexibility, and posture for 17 minutes including:  Shoulder Exercises         UBE  4 minutes   Pulleys  4 minutes    Corner Stretch   4 x 15 seconds    Cane Flexion on Wall   20x with 3 second hold   Cane flexion off wall   2 x 10 3#   Cane behind back  20x   TRX hang outs and walk outs  5 x 10 second hold    Cybex Shoulder Press  2 x 10 1 plate   Cybex Lat Pull Down  2 x 10 with 1 plate with stretch at top   Cybex/Cable Bicep Curls      Cybex/Cable Tricep Press Downs                      Neuro Re-Education x 23 minutes           Shoulder Int Rot and Ext Rot  2 x 10 green tubing   Shoulder Flex and Abduction  2 x 10 red TB   Shoulder Ext/Scap Retraction  2 x 10 blue tubing   Horizontal Abduction  2 x 10 with green TB    Bilateral External Rotation  2 x 10 with green TB   Cybex rows wide and close  2 x 10  3 plates                           Shoulder Manual x 0 minutes     PROM with End Range Stretch  x   Capsular Mobilizations   x   Scapular PNF  X   Deep Tissue/Myofascial  X               Shoulder Modalities           Home Exercises Provided and Patient Education Provided     Education provided: HOME EXERCISE PROGRAM review    Written Home Exercises Provided: Patient instructed to cont prior HEP.  Exercises were reviewed and Tonya was able to demonstrate them prior to the end of the session.  Tonya demonstrated good  understanding of the education provided.     See EMR under Patient Instructions for exercises provided prior visit.    Assessment   ACTIVE RANGE OF MOTION: Flexion= 120 degrees, Abduction = 90 degrees, Internal rotation= L5 and External rotation= 70 degrees    Patient has been able to increase her activity around house but had some increased soreness yesterday due to doing too much. Patient is responding well to treatment with decreased pain and improved functional use. Increased weight on shoulder flexion today. Will continue to advance functional motion and strength as tolerated.        PMH:  Tonya is a 83 y.o. female referred to outpatient Physical Therapy with a medical diagnosis of Closed fracture of head of right humerus, initial encounter. Patient presents with Right shoulder pain, decreased shoulder motion, abnormal posture, scapular and shoulder weakness. Patient's right shoulder was immobilized for 9 weeks with patient very limited in flexion and abduction. Patient instructed in home program today with handouts provided. Patient introduced to pulleys with instructions to order for home use. Patient had moderate pain with range of motion but patient tolerated exercises well today. Patient will benefit from skilled Physical Therapy intervention to address all deficits and help patient to return to their  prior level of function.      Tonya Is progressing well towards her goals.   Pt prognosis is Good.     Pt will continue to benefit from skilled outpatient physical therapy to address the deficits listed in the problem list box on initial evaluation, provide pt/family education and to maximize pt's level of independence in the home and community environment.     Pt's spiritual, cultural and educational needs considered and pt agreeable to plan of care and goals.     Anticipated barriers to physical therapy: None    Goals :   Short Term Goals : 4 weeks  Independent with Home Exercise Program   Increase Right Shoulder Flexion and Abduction Passive Range of Motion to WITHIN FUNCTIONAL LIMITS   Increase Right Shoulder Internal Rotation and External Rotation Range of Motion to WITHIN FUNCTIONAL LIMITS   Increase Right Scapular Strength to 4+/5   Decrease complaints of Right Shoulder Pain to 4/10 with Activity      Long Term Goals : 8 weeks  1.Patient will perform 30 minutes of Activity with use of Right Shoulder with Pain Less than or Equal to 2/10  2. Increase Active Range of Motion to Within Functional Limits   3. Increase Right Scapular Strength to 5/5   4.  Increase Right Shoulder Strength to 4/5 or greater    Plan     Plan of care Certification: 3/11/2024 to 5/10/2024.     Outpatient Physical Therapy 2 times weekly for 8 weeks to include the following interventions: Electrical Stimulation Pre-Mod, Manual Therapy, Moist Heat/ Ice, Neuromuscular Re-ed, Patient Education, Therapeutic Activities, and Therapeutic Exercise.     MAGDALENA DELCID, PT  4/17/2024

## 2024-04-22 ENCOUNTER — CLINICAL SUPPORT (OUTPATIENT)
Dept: REHABILITATION | Facility: HOSPITAL | Age: 84
End: 2024-04-22
Payer: MEDICARE

## 2024-04-22 DIAGNOSIS — S42.291A CLOSED FRACTURE OF HEAD OF HUMERUS, RIGHT, INITIAL ENCOUNTER: Primary | ICD-10-CM

## 2024-04-22 PROCEDURE — 97110 THERAPEUTIC EXERCISES: CPT

## 2024-04-22 PROCEDURE — 97112 NEUROMUSCULAR REEDUCATION: CPT

## 2024-04-22 NOTE — PROGRESS NOTES
[facility]   Physical Therapy Treatment Note     Name: Tonya Sandoval  Clinic Number: [id number]    Visit Date: 4/22/2024    Therapy Diagnosis: Closed fracture of head of right humerus, initial encounter      Physician:   Jose Alfredo Poon III, MD      Physician Orders: PT Eval and Treat Right Shoulder  Medical Diagnosis from Referral: Closed fracture of head of right humerus, initial encounter  Evaluation Date: 3/11/2024  Authorization Period Expiration: 3/7/2025  Plan of Care Expiration: 5/10/2024  Progress Note Due: As needed  Visit # / Visits authorized: 9 / 16   FOTO: 33 /100  PTA Visit: 3/5    Time In: 4:45 pm  Time Out: 5:28 pm  Total Billable Time: 40 minutes    Precautions: Standard  Prior Level of Function: Prior to fall, patient was active and working in yard with no limitations.  Current Level of Function: Patient is currently unable to functionally use right arm.    Subjective     Pt reports: that she felt really good this weekend.  She was compliant with home exercise program.    Pain: 2/10  Location: right shoulder      Objective       Tonya received therapeutic exercises to develop strength, ROM, flexibility, and posture for 17 minutes including:  Shoulder Exercises         UBE  4 minutes   Pulleys  4 minutes    Corner Stretch   4 x 15 seconds    Cane Flexion on Wall   20x with 3 second hold   Cane flexion off wall   2 x 10 3#   Cane behind back  20x   TRX hang outs and walk outs  5 x 10 second hold    Cybex Shoulder Press  2 x 10 1 plate   Cybex Lat Pull Down  2 x 10 with 1 plate with stretch at top   Cybex/Cable Bicep Curls      Cybex/Cable Tricep Press Downs                      Neuro Re-Education x 23 minutes           Shoulder Int Rot and Ext Rot  2 x 10 green tubing   Shoulder Flex and Abduction  2 x 10 red TB   Shoulder Ext/Scap Retraction  2 x 10 blue tubing   Horizontal Abduction  2 x 10 with green TB   Bilateral External Rotation  2 x 10 with green  TB   Cybex rows wide and close  2 x 10  3 plates                           Shoulder Manual x 0 minutes     PROM with End Range Stretch  x   Capsular Mobilizations   x   Scapular PNF  X   Deep Tissue/Myofascial  X               Shoulder Modalities           Home Exercises Provided and Patient Education Provided     Education provided: HOME EXERCISE PROGRAM review    Written Home Exercises Provided: Patient instructed to cont prior HEP.  Exercises were reviewed and Tonya was able to demonstrate them prior to the end of the session.  Tonya demonstrated good  understanding of the education provided.     See EMR under Patient Instructions for exercises provided prior visit.    Assessment   ACTIVE RANGE OF MOTION: Flexion= 135 degrees, Abduction = 110 degrees, Internal rotation= L5 and External rotation= 70 degrees    Patient continues to increase her activity around house. Patient is responding well to treatment with decreased pain and improved functional use. Patient has met STG's with motion within functional limits. Patient needs further improvement in strength especially above 90 degrees. Will continue to advance functional motion and strength as tolerated to meet set goals.         PMH:  Tonya is a 83 y.o. female referred to outpatient Physical Therapy with a medical diagnosis of Closed fracture of head of right humerus, initial encounter. Patient presents with Right shoulder pain, decreased shoulder motion, abnormal posture, scapular and shoulder weakness. Patient's right shoulder was immobilized for 9 weeks with patient very limited in flexion and abduction. Patient instructed in home program today with handouts provided. Patient introduced to pulleys with instructions to order for home use. Patient had moderate pain with range of motion but patient tolerated exercises well today. Patient will benefit from skilled Physical Therapy intervention to address all deficits and help patient to return to their prior  level of function.      Tonya Is progressing well towards her goals.   Pt prognosis is Good.     Pt will continue to benefit from skilled outpatient physical therapy to address the deficits listed in the problem list box on initial evaluation, provide pt/family education and to maximize pt's level of independence in the home and community environment.     Pt's spiritual, cultural and educational needs considered and pt agreeable to plan of care and goals.     Anticipated barriers to physical therapy: None    Goals :   Short Term Goals : 4 weeks  Independent with Home Exercise Program : Met  Increase Right Shoulder Flexion and Abduction Passive Range of Motion to WITHIN FUNCTIONAL LIMITS : Met  Increase Right Shoulder Internal Rotation and External Rotation Range of Motion to WITHIN FUNCTIONAL LIMITS : Met  Increase Right Scapular Strength to 4+/5 : Met  Decrease complaints of Right Shoulder Pain to 4/10 with Activity : Met     Long Term Goals : 8 weeks  1.Patient will perform 30 minutes of Activity with use of Right Shoulder with Pain Less than or Equal to 2/10 : Met  2. Increase Active Range of Motion to Within Functional Limits : Met  3. Increase Right Scapular Strength to 5/5 : Ongoing  4.  Increase Right Shoulder Strength to 4/5 or greater : Ongoing    Plan     Plan of care Certification: 3/11/2024 to 5/10/2024.     Outpatient Physical Therapy 2 times weekly for 8 weeks to include the following interventions: Electrical Stimulation Pre-Mod, Manual Therapy, Moist Heat/ Ice, Neuromuscular Re-ed, Patient Education, Therapeutic Activities, and Therapeutic Exercise.     MAGDALENA DELCID, PT  4/22/2024

## 2024-04-24 ENCOUNTER — CLINICAL SUPPORT (OUTPATIENT)
Dept: REHABILITATION | Facility: HOSPITAL | Age: 84
End: 2024-04-24
Payer: MEDICARE

## 2024-04-24 DIAGNOSIS — S42.291A CLOSED FRACTURE OF HEAD OF HUMERUS, RIGHT, INITIAL ENCOUNTER: Primary | ICD-10-CM

## 2024-04-24 PROCEDURE — 97112 NEUROMUSCULAR REEDUCATION: CPT | Mod: CQ

## 2024-04-24 PROCEDURE — 97110 THERAPEUTIC EXERCISES: CPT | Mod: CQ

## 2024-04-24 NOTE — PROGRESS NOTES
[facility]   Physical Therapy Treatment Note     Name: Tonya Sandoval  Clinic Number: [id number]    Visit Date: 4/24/2024    Therapy Diagnosis: Closed fracture of head of right humerus, initial encounter      Physician:   Jose Alfredo Poon III, MD      Physician Orders: PT Eval and Treat Right Shoulder  Medical Diagnosis from Referral: Closed fracture of head of right humerus, initial encounter  Evaluation Date: 3/11/2024  Authorization Period Expiration: 3/7/2025  Plan of Care Expiration: 5/10/2024  Progress Note Due: As needed  Visit # / Visits authorized: 10 / 16   FOTO: 33 /100  PTA Visit: 1/5    Time In: 7:46 am  Time Out: 08:24 am  Total Billable Time: 38 minutes    Precautions: Standard  Prior Level of Function: Prior to fall, patient was active and working in yard with no limitations.  Current Level of Function: Patient is currently unable to functionally use right arm.    Subjective     Pt reports: some increased soreness in the right arm this morning.   She was compliant with home exercise program.    Pain: 2/10  Location: right shoulder      Objective       Tonya received therapeutic exercises to develop strength, ROM, flexibility, and posture for 15 minutes including:    Shoulder Exercises      UBE  4 minutes   Pulleys  4 minutes    Corner Stretch   4 x 15 seconds    Cane Flexion on Wall   20x with 3 second hold   Cane flexion off wall   2 x 10 3#   Cane behind back  20x   TRX hang outs and walk outs  5 x 10 second hold    Cybex Shoulder Press  2 x 10 1 plate   Cybex Lat Pull Down  2 x 10 with 1 plate with stretch at top   Cybex/Cable Bicep Curls   2 x 10 1 plate    Cybex/Cable Tricep Press Downs                      Neuro Re-Education x 23 minutes           Shoulder Int Rot and Ext Rot  2 x 10 green tubing   Shoulder Flex and Abduction  2 x 10 red TB   Shoulder Ext/Scap Retraction  2 x 10 blue tubing   Bilateral Horizontal Abduction  2 x 10 with green TB    Bilateral External Rotation  2 x 10 with green TB   Cybex rows wide and close  2 x 10  3 plates                     Shoulder Manual x 0 minutes     PROM with End Range Stretch  x   Capsular Mobilizations   x   Scapular PNF  X   Deep Tissue/Myofascial  X               Shoulder Modalities     Home Exercises Provided and Patient Education Provided     Education provided: HOME EXERCISE PROGRAM review    Written Home Exercises Provided: Patient instructed to cont prior HEP.  Exercises were reviewed and Tonya was able to demonstrate them prior to the end of the session.  Tonya demonstrated good  understanding of the education provided.     See EMR under Patient Instructions for exercises provided prior visit.    Assessment     ACTIVE RANGE OF MOTION: Flexion= 135 degrees, Abduction = 110 degrees, Internal rotation= L5 and External rotation= 70 degrees    Patient arrived with some mild soreness in the right arm today. She has been compliant with home exercise program and increased activity around her home.  Patient is responding well to treatment with decreased pain and improved functional use. Patient has met STG's with motion within functional limits. Added cybex bicep curl machine today to further increase strength in that right arm. Patient does need to continue with increasing strength in overhead flexion especially above 90 degrees. Will continue to advance functional motion and strength as tolerated to meet set goals.       PMH:  Tonya is a 83 y.o. female referred to outpatient Physical Therapy with a medical diagnosis of Closed fracture of head of right humerus, initial encounter. Patient presents with Right shoulder pain, decreased shoulder motion, abnormal posture, scapular and shoulder weakness. Patient's right shoulder was immobilized for 9 weeks with patient very limited in flexion and abduction. Patient instructed in home program today with handouts provided. Patient introduced to pulleys with  instructions to order for home use. Patient had moderate pain with range of motion but patient tolerated exercises well today. Patient will benefit from skilled Physical Therapy intervention to address all deficits and help patient to return to their prior level of function.      Tonya Is progressing well towards her goals.   Pt prognosis is Good.     Pt will continue to benefit from skilled outpatient physical therapy to address the deficits listed in the problem list box on initial evaluation, provide pt/family education and to maximize pt's level of independence in the home and community environment.     Pt's spiritual, cultural and educational needs considered and pt agreeable to plan of care and goals.     Anticipated barriers to physical therapy: None    Goals :   Short Term Goals : 4 weeks  Independent with Home Exercise Program : Met  Increase Right Shoulder Flexion and Abduction Passive Range of Motion to WITHIN FUNCTIONAL LIMITS : Met  Increase Right Shoulder Internal Rotation and External Rotation Range of Motion to WITHIN FUNCTIONAL LIMITS : Met  Increase Right Scapular Strength to 4+/5 : Met  Decrease complaints of Right Shoulder Pain to 4/10 with Activity : Met     Long Term Goals : 8 weeks  1.Patient will perform 30 minutes of Activity with use of Right Shoulder with Pain Less than or Equal to 2/10 : Met  2. Increase Active Range of Motion to Within Functional Limits : Met  3. Increase Right Scapular Strength to 5/5 : Ongoing  4.  Increase Right Shoulder Strength to 4/5 or greater : Ongoing    Plan     Plan of care Certification: 3/11/2024 to 5/10/2024.     Outpatient Physical Therapy 2 times weekly for 8 weeks to include the following interventions: Electrical Stimulation Pre-Mod, Manual Therapy, Moist Heat/ Ice, Neuromuscular Re-ed, Patient Education, Therapeutic Activities, and Therapeutic Exercise.     Moncho Jones, PTA  4/24/2024

## 2024-04-29 ENCOUNTER — CLINICAL SUPPORT (OUTPATIENT)
Dept: REHABILITATION | Facility: HOSPITAL | Age: 84
End: 2024-04-29
Payer: MEDICARE

## 2024-04-29 DIAGNOSIS — S42.291A CLOSED FRACTURE OF HEAD OF HUMERUS, RIGHT, INITIAL ENCOUNTER: Primary | ICD-10-CM

## 2024-04-29 PROCEDURE — 97110 THERAPEUTIC EXERCISES: CPT | Mod: CQ

## 2024-04-29 PROCEDURE — 97140 MANUAL THERAPY 1/> REGIONS: CPT | Mod: CQ

## 2024-04-29 PROCEDURE — 97530 THERAPEUTIC ACTIVITIES: CPT | Mod: CQ

## 2024-04-29 PROCEDURE — 97112 NEUROMUSCULAR REEDUCATION: CPT | Mod: CQ

## 2024-04-29 NOTE — PROGRESS NOTES
[facility]   Physical Therapy Treatment Note     Name: Tonya Sandoval  Clinic Number: [id number]    Visit Date: 4/29/2024    Therapy Diagnosis: Closed fracture of head of right humerus, initial encounter      Physician:   Jose Alfredo Poon III, MD      Physician Orders: PT Eval and Treat Right Shoulder  Medical Diagnosis from Referral: Closed fracture of head of right humerus, initial encounter  Evaluation Date: 3/11/2024  Authorization Period Expiration: 3/7/2025  Plan of Care Expiration: 5/10/2024  Progress Note Due: As needed  Visit # / Visits authorized: 11 / 16   FOTO: 33 /100  PTA Visit: 2/5    Time In: 1700  Time Out: 1800  Total Billable Time: 60 minutes    Precautions: Standard  Prior Level of Function: Prior to fall, patient was active and working in yard with no limitations.  Current Level of Function: Patient is currently unable to functionally use right arm.    Subjective     Pt reports: still can't sleep in that shoulder, some ADL activities still causes problems but changes at different  She was compliant with home exercise program.    Pain: 2/10  Location: right shoulder      Objective       Tonya received therapeutic exercises to develop strength, ROM, flexibility, and posture for 15 minutes including:    Shoulder Exercises      UBE  4 minutes   Pulleys   minutes    Corner Stretch   4 x 15 seconds    Cane Flexion on Wall   20x with 3 second hold   Cane flexion off wall   2 x 10 3#   Cane behind back  20x towel   TRX hang outs and walk outs  5 x 10 second hold    Cybex Shoulder Press  2 x 10 1 plate   Cybex Lat Pull Down  2 x 10 with 1 plate with stretch at top   Cybex/Cable Bicep Curls   2 x 10 green tband   Cybex/Cable Tricep Press Downs 2 x 10 green tband                     Neuro Re-Education x 23 minutes           Shoulder Int Rot and Ext Rot  2 x 10 green tubing   Shoulder Flex and Abduction  2 x 10 red TB   Shoulder Ext/Scap Retraction  2 x 10 blue  tubing   Bilateral Horizontal Abduction  2 x 10 with green TB   Bilateral External Rotation  2 x 10 with green TB   Cybex rows wide and close  2 x 10  4 plates     SL EXTERNAL ROTATION   3# 2 x 10               Shoulder Manual x 15 minutes     PROM with End Range Stretch  x   Capsular Mobilizations   x   Scapular PNF  X   Deep Tissue/Myofascial  X               Shoulder Modalities     Home Exercises Provided and Patient Education Provided     Education provided: HOME EXERCISE PROGRAM review    Written Home Exercises Provided: Patient instructed to cont prior HEP.  Exercises were reviewed and Tonya was able to demonstrate them prior to the end of the session.  Tonya demonstrated good  understanding of the education provided.     See EMR under Patient Instructions for exercises provided prior visit.    Assessment     ACTIVE RANGE OF MOTION: Flexion= 135 degrees, Abduction = 110 degrees, Internal rotation= L5 and External rotation= 70 degrees    Pt still with some painful restriction to motion and some capsular tightness. Issued green theraputty for hand strengthening.  PMH:  Tonya is a 83 y.o. female referred to outpatient Physical Therapy with a medical diagnosis of Closed fracture of head of right humerus, initial encounter. Patient presents with Right shoulder pain, decreased shoulder motion, abnormal posture, scapular and shoulder weakness. Patient's right shoulder was immobilized for 9 weeks with patient very limited in flexion and abduction. Patient instructed in home program today with handouts provided. Patient introduced to pulleys with instructions to order for home use. Patient had moderate pain with range of motion but patient tolerated exercises well today. Patient will benefit from skilled Physical Therapy intervention to address all deficits and help patient to return to their prior level of function.      Tonya Is progressing well towards her goals.   Pt prognosis is Good.     Pt will continue  to benefit from skilled outpatient physical therapy to address the deficits listed in the problem list box on initial evaluation, provide pt/family education and to maximize pt's level of independence in the home and community environment.     Pt's spiritual, cultural and educational needs considered and pt agreeable to plan of care and goals.     Anticipated barriers to physical therapy: None    Goals :   Short Term Goals : 4 weeks  Independent with Home Exercise Program : Met  Increase Right Shoulder Flexion and Abduction Passive Range of Motion to WITHIN FUNCTIONAL LIMITS : Met  Increase Right Shoulder Internal Rotation and External Rotation Range of Motion to WITHIN FUNCTIONAL LIMITS : Met  Increase Right Scapular Strength to 4+/5 : Met  Decrease complaints of Right Shoulder Pain to 4/10 with Activity : Met     Long Term Goals : 8 weeks  1.Patient will perform 30 minutes of Activity with use of Right Shoulder with Pain Less than or Equal to 2/10 : Met  2. Increase Active Range of Motion to Within Functional Limits : Met  3. Increase Right Scapular Strength to 5/5 : Ongoing  4.  Increase Right Shoulder Strength to 4/5 or greater : Ongoing    Plan     Plan of care Certification: 3/11/2024 to 5/10/2024.     Outpatient Physical Therapy 2 times weekly for 8 weeks to include the following interventions: Electrical Stimulation Pre-Mod, Manual Therapy, Moist Heat/ Ice, Neuromuscular Re-ed, Patient Education, Therapeutic Activities, and Therapeutic Exercise.     Latesha Canela, PTA  4/29/2024

## 2024-05-06 ENCOUNTER — CLINICAL SUPPORT (OUTPATIENT)
Dept: REHABILITATION | Facility: HOSPITAL | Age: 84
End: 2024-05-06
Payer: MEDICARE

## 2024-05-06 DIAGNOSIS — S42.291A CLOSED FRACTURE OF HEAD OF HUMERUS, RIGHT, INITIAL ENCOUNTER: Primary | ICD-10-CM

## 2024-05-06 PROCEDURE — 97112 NEUROMUSCULAR REEDUCATION: CPT

## 2024-05-06 PROCEDURE — 97110 THERAPEUTIC EXERCISES: CPT

## 2024-05-06 NOTE — PROGRESS NOTES
[facility]   Physical Therapy Treatment Note     Name: Tonya Sandoval  Clinic Number: [id number]    Visit Date: 5/6/2024    Therapy Diagnosis: Closed fracture of head of right humerus, initial encounter      Physician:   Jose Alfredo Poon III, MD      Physician Orders: PT Eval and Treat Right Shoulder  Medical Diagnosis from Referral: Closed fracture of head of right humerus, initial encounter  Evaluation Date: 3/11/2024  Authorization Period Expiration: 3/7/2025  Plan of Care Expiration: 5/10/2024  Progress Note Due: As needed  Visit # / Visits authorized: 12 / 16   FOTO: 33 /100  PTA Visit: 2/5    Time In: 7:40 am  Time Out: 8:28 am  Total Billable Time: 45 minutes    Precautions: Standard  Prior Level of Function: Prior to fall, patient was active and working in yard with no limitations.  Current Level of Function: Patient is currently unable to functionally use right arm.    Subjective     Pt reports: that arm was sore yesterday from doing too much on Saturday.  She was compliant with home exercise program.    Pain: 2/10  Location: right shoulder      Objective       Tonya received therapeutic exercises to develop strength, ROM, flexibility, and posture for 22 minutes including:    Shoulder Exercises      UBE  4 minutes   Pulleys  4 minutes    Corner Stretch   4 x 15 seconds    Cane Flexion on Wall   20x with 3 second hold   Cane flexion off wall   2 x 10 3#   Cane behind back  20x towel   TRX hang outs and walk outs  5 x 10 second hold    Cybex Shoulder Press  2 x 10 1 plate   Cybex Lat Pull Down  2 x 10 with 1 plate with stretch at top                     Neuro Re-Education x 23 minutes           Shoulder Int Rot and Ext Rot  2 x 10 green tubing   Shoulder Flex and Abduction  2 x 10 red TB   Shoulder Ext/Scap Retraction  2 x 10 blue tubing   Bilateral Horizontal Abduction  2 x 10 with green TB   Bilateral External Rotation  2 x 10 with green TB   Cybex rows wide and  close  2 x 10  5 plates     SL EXTERNAL ROTATION   3# 2 x 10               Shoulder Manual x 0 minutes     PROM with End Range Stretch  x   Capsular Mobilizations   x   Scapular PNF  X   Deep Tissue/Myofascial  X               Shoulder Modalities     Home Exercises Provided and Patient Education Provided     Education provided: HOME EXERCISE PROGRAM review    Written Home Exercises Provided: Patient instructed to cont prior HEP.  Exercises were reviewed and Tonya was able to demonstrate them prior to the end of the session.  Tonya demonstrated good  understanding of the education provided.     See EMR under Patient Instructions for exercises provided prior visit.    Assessment     ACTIVE RANGE OF MOTION: Flexion= 135 degrees, Abduction = 110 degrees, Internal rotation= L5 and External rotation= 70 degrees    Increased weight on Cybex rows today allowing patient to met LTG for scapular strength. Patient continues to hike right shoulder with forward flexion. Patient's motion is functional and continue to work on improving functional strength. Plan to discharge next visit due to PLAN OF CARE expiring and patient satisfied with current results.      PMH:  Tonya is a 83 y.o. female referred to outpatient Physical Therapy with a medical diagnosis of Closed fracture of head of right humerus, initial encounter. Patient presents with Right shoulder pain, decreased shoulder motion, abnormal posture, scapular and shoulder weakness. Patient's right shoulder was immobilized for 9 weeks with patient very limited in flexion and abduction. Patient instructed in home program today with handouts provided. Patient introduced to pulleys with instructions to order for home use. Patient had moderate pain with range of motion but patient tolerated exercises well today. Patient will benefit from skilled Physical Therapy intervention to address all deficits and help patient to return to their prior level of function.      Tonya Marquez  progressing well towards her goals.   Pt prognosis is Good.     Pt will continue to benefit from skilled outpatient physical therapy to address the deficits listed in the problem list box on initial evaluation, provide pt/family education and to maximize pt's level of independence in the home and community environment.     Pt's spiritual, cultural and educational needs considered and pt agreeable to plan of care and goals.     Anticipated barriers to physical therapy: None    Goals :   Short Term Goals : 4 weeks  Independent with Home Exercise Program : Met  Increase Right Shoulder Flexion and Abduction Passive Range of Motion to WITHIN FUNCTIONAL LIMITS : Met  Increase Right Shoulder Internal Rotation and External Rotation Range of Motion to WITHIN FUNCTIONAL LIMITS : Met  Increase Right Scapular Strength to 4+/5 : Met  Decrease complaints of Right Shoulder Pain to 4/10 with Activity : Met     Long Term Goals : 8 weeks  1.Patient will perform 30 minutes of Activity with use of Right Shoulder with Pain Less than or Equal to 2/10 : Met  2. Increase Active Range of Motion to Within Functional Limits : Met  3. Increase Right Scapular Strength to 5/5 : Met  4.  Increase Right Shoulder Strength to 4/5 or greater : Ongoing    Plan     Plan of care Certification: 3/11/2024 to 5/10/2024.     Outpatient Physical Therapy 2 times weekly for 8 weeks to include the following interventions: Electrical Stimulation Pre-Mod, Manual Therapy, Moist Heat/ Ice, Neuromuscular Re-ed, Patient Education, Therapeutic Activities, and Therapeutic Exercise.     MAGDALENA DELCID, PT  5/6/2024

## 2024-05-08 DIAGNOSIS — S42.214A CLOSED NONDISPLACED FRACTURE OF SURGICAL NECK OF RIGHT HUMERUS, UNSPECIFIED FRACTURE MORPHOLOGY, INITIAL ENCOUNTER: Primary | ICD-10-CM

## 2024-05-09 ENCOUNTER — OFFICE VISIT (OUTPATIENT)
Dept: ORTHOPEDICS | Facility: CLINIC | Age: 84
End: 2024-05-09
Payer: MEDICARE

## 2024-05-09 ENCOUNTER — CLINICAL SUPPORT (OUTPATIENT)
Dept: REHABILITATION | Facility: HOSPITAL | Age: 84
End: 2024-05-09
Payer: MEDICARE

## 2024-05-09 ENCOUNTER — HOSPITAL ENCOUNTER (OUTPATIENT)
Dept: RADIOLOGY | Facility: HOSPITAL | Age: 84
Discharge: HOME | End: 2024-05-09
Attending: ORTHOPAEDIC SURGERY
Payer: MEDICARE

## 2024-05-09 DIAGNOSIS — S42.214A CLOSED NONDISPLACED FRACTURE OF SURGICAL NECK OF RIGHT HUMERUS, UNSPECIFIED FRACTURE MORPHOLOGY, INITIAL ENCOUNTER: ICD-10-CM

## 2024-05-09 DIAGNOSIS — S42.291A CLOSED FRACTURE OF HEAD OF HUMERUS, RIGHT, INITIAL ENCOUNTER: Primary | ICD-10-CM

## 2024-05-09 DIAGNOSIS — Z09 FOLLOW-UP EXAMINATION, FOLLOWING OTHER SURGERY: Primary | ICD-10-CM

## 2024-05-09 PROCEDURE — 97110 THERAPEUTIC EXERCISES: CPT | Mod: KX

## 2024-05-09 PROCEDURE — 99212 OFFICE O/P EST SF 10 MIN: CPT | Mod: S$PBB,,, | Performed by: ORTHOPAEDIC SURGERY

## 2024-05-09 PROCEDURE — 73030 X-RAY EXAM OF SHOULDER: CPT | Mod: TC,RT

## 2024-05-09 PROCEDURE — 73030 X-RAY EXAM OF SHOULDER: CPT | Mod: 26,RT,, | Performed by: ORTHOPAEDIC SURGERY

## 2024-05-09 PROCEDURE — 99212 OFFICE O/P EST SF 10 MIN: CPT | Mod: PBBFAC,25 | Performed by: ORTHOPAEDIC SURGERY

## 2024-05-09 PROCEDURE — 97112 NEUROMUSCULAR REEDUCATION: CPT | Mod: KX

## 2024-05-09 NOTE — PROGRESS NOTES
[facility]   Physical Therapy Treatment Note     Name: Tonya Sandoval  Clinic Number: [id number]    Visit Date: 5/9/2024    Therapy Diagnosis: Closed fracture of head of right humerus, initial encounter      Physician:   Jose Alfredo Poon III, MD      Physician Orders: PT Eval and Treat Right Shoulder  Medical Diagnosis from Referral: Closed fracture of head of right humerus, initial encounter  Evaluation Date: 3/11/2024  Authorization Period Expiration: 3/7/2025  Plan of Care Expiration: 5/10/2024  Progress Note Due: As needed  Visit # / Visits authorized: 13 / 16   FOTO: 33 /100; Discharge FOTO: 58/100  PTA Visit: 2/5    Time In: 9:00 am  Time Out: 9:38 am  Total Billable Time: 38 minutes    Precautions: Standard  Prior Level of Function: Prior to fall, patient was active and working in yard with no limitations.  Current Level of Function: Patient is currently unable to functionally use right arm.    Subjective     Pt reports: that she is ready for discharge and will keep up with home program. She says that she sees Dr. Poon today.  She was compliant with home exercise program.    Pain: 2/10  Location: right shoulder      Objective       Tonya received therapeutic exercises to develop strength, ROM, flexibility, and posture for 15 minutes including:    Shoulder Exercises      UBE  4 minutes   Pulleys  4 minutes    Corner Stretch   4 x 15 seconds    Cane Flexion on Wall   20x with 3 second hold   Cane flexion off wall   2 x 10 3#   Cane behind back  20x towel   TRX hang outs and walk outs  5 x 10 second hold    Cybex Shoulder Press  2 x 10 2 plates   Cybex Lat Pull Down  2 x 10 with 2 plates with stretch at top                     Neuro Re-Education x 23 minutes           Shoulder Int Rot and Ext Rot  2 x 10 green tubing   Shoulder Flex and Abduction  2 x 10 red TB   Shoulder Ext/Scap Retraction  2 x 10 blue tubing   Bilateral Horizontal Abduction  2 x 10 with green TB    Bilateral External Rotation  2 x 10 with green TB   Cybex rows wide and close  2 x 10  5 plates     SL EXTERNAL ROTATION   3# 2 x 10               Shoulder Manual x 0 minutes     PROM with End Range Stretch  x   Capsular Mobilizations   x   Scapular PNF  X   Deep Tissue/Myofascial  X               Shoulder Modalities     Home Exercises Provided and Patient Education Provided     Education provided: HOME EXERCISE PROGRAM review    Written Home Exercises Provided: Patient instructed to cont prior HEP.  Exercises were reviewed and Tonya was able to demonstrate them prior to the end of the session.  Tonya demonstrated good  understanding of the education provided.     See EMR under Patient Instructions for exercises provided prior visit.    Assessment     ACTIVE RANGE OF MOTION: Flexion= 135 degrees, Abduction = 110 degrees, Internal rotation= L2 and External rotation= 70 degrees    Patient has met all her goals with motion in functional range. Patient will see Dr. Poon today after therapy. Patient was able to increase weight on Cybex shoulder press and lat pull downs today. Will discharge with patient instructed to continue with home program. Patient has pulleys and resistive bands at home. All questions answered.       PMH:  Tonya is a 83 y.o. female referred to outpatient Physical Therapy with a medical diagnosis of Closed fracture of head of right humerus, initial encounter. Patient presents with Right shoulder pain, decreased shoulder motion, abnormal posture, scapular and shoulder weakness. Patient's right shoulder was immobilized for 9 weeks with patient very limited in flexion and abduction. Patient instructed in home program today with handouts provided. Patient introduced to pulleys with instructions to order for home use. Patient had moderate pain with range of motion but patient tolerated exercises well today. Patient will benefit from skilled Physical Therapy intervention to address all deficits  and help patient to return to their prior level of function.      Tonya Is progressing well towards her goals.   Pt prognosis is Good.     Pt will continue to benefit from skilled outpatient physical therapy to address the deficits listed in the problem list box on initial evaluation, provide pt/family education and to maximize pt's level of independence in the home and community environment.     Pt's spiritual, cultural and educational needs considered and pt agreeable to plan of care and goals.     Anticipated barriers to physical therapy: None    Goals :   Short Term Goals : 4 weeks  Independent with Home Exercise Program : Met  Increase Right Shoulder Flexion and Abduction Passive Range of Motion to WITHIN FUNCTIONAL LIMITS : Met  Increase Right Shoulder Internal Rotation and External Rotation Range of Motion to WITHIN FUNCTIONAL LIMITS : Met  Increase Right Scapular Strength to 4+/5 : Met  Decrease complaints of Right Shoulder Pain to 4/10 with Activity : Met     Long Term Goals : 8 weeks  1.Patient will perform 30 minutes of Activity with use of Right Shoulder with Pain Less than or Equal to 2/10 : Met  2. Increase Active Range of Motion to Within Functional Limits : Met  3. Increase Right Scapular Strength to 5/5 : Met  4.  Increase Right Shoulder Strength to 4/5 or greater : Met through available range of motion.    Plan     Plan of care Certification: 3/11/2024 to 5/10/2024.     Outpatient Physical Therapy 2 times weekly for 8 weeks to include the following interventions: Electrical Stimulation Pre-Mod, Manual Therapy, Moist Heat/ Ice, Neuromuscular Re-ed, Patient Education, Therapeutic Activities, and Therapeutic Exercise.     MAGDALENA DELCID, PT  5/9/2024

## 2024-05-09 NOTE — PROGRESS NOTES
CC:    Chief Complaint   Patient presents with    Follow-up     RT HUMERAL NECK FX DOI 1/1- 5 MONTHS            Previos History :     Previos History :  History:  3/7/2024   Tonya Sandoval is a 83 y.o.  status post follow-up right humeral neck fracture date of injury 01/01/2024 about 9 weeks ago          History:  4/9/2024   Tonya Sandoval is a 83 y.o.  status post follow-up right humeral neck fracture 01/01/2024 2 months out says she is feeling better she is in formal therapy           History:  5/9/2024   Tonya Sandoval is a 83 y.o.  status post states physical therapy has helped she is pleased  follow-up right humeral neck fracture date of injury 01/01/2024    4 months ago base      PE:   The the shoulder he is got least 140° forward flexion abduction 90 pain-free      Radiology:  Right shoulder AP transscapular lateral impacted humeral neck fracture progressively healing good alignment joints congruent reduced        Ass/Plan:  I will see her back p.r.n. looks but doing well        Jose Alfredo Poon III, MD    Subject to voice recognition errors,  transcription services are not allowed

## 2024-05-09 NOTE — PLAN OF CARE
[facility]   Physical Therapy Discharge Summary     Name: Tonya Sandoval  Clinic Number: [id number]    Visit Date: 5/9/2024    Therapy Diagnosis: Closed fracture of head of right humerus, initial encounter      Physician:   Jose Alfredo Poon III, MD      Physician Orders: PT Eval and Treat Right Shoulder  Medical Diagnosis from Referral: Closed fracture of head of right humerus, initial encounter  Evaluation Date: 3/11/2024  Authorization Period Expiration: 3/7/2025  Plan of Care Expiration: 5/10/2024  Progress Note Due: As needed  Visit # / Visits authorized: 13 / 16   FOTO: 33 /100; Discharge FOTO: 58/100  PTA Visit: 2/5    Time In: 9:00 am  Time Out: 9:38 am  Total Billable Time: 38 minutes    Precautions: Standard  Prior Level of Function: Prior to fall, patient was active and working in yard with no limitations.  Current Level of Function: Patient is currently unable to functionally use right arm.    Subjective     Pt reports: that she is ready for discharge and will keep up with home program. She says that she sees Dr. Poon today.  She was compliant with home exercise program.    Pain: 2/10  Location: right shoulder      Objective       Tonya received therapeutic exercises to develop strength, ROM, flexibility, and posture for 15 minutes including:    Shoulder Exercises      UBE  4 minutes   Pulleys  4 minutes    Corner Stretch   4 x 15 seconds    Cane Flexion on Wall   20x with 3 second hold   Cane flexion off wall   2 x 10 3#   Cane behind back  20x towel   TRX hang outs and walk outs  5 x 10 second hold    Cybex Shoulder Press  2 x 10 2 plates   Cybex Lat Pull Down  2 x 10 with 2 plates with stretch at top                     Neuro Re-Education x 23 minutes           Shoulder Int Rot and Ext Rot  2 x 10 green tubing   Shoulder Flex and Abduction  2 x 10 red TB   Shoulder Ext/Scap Retraction  2 x 10 blue tubing   Bilateral Horizontal Abduction  2 x 10 with green TB    Bilateral External Rotation  2 x 10 with green TB   Cybex rows wide and close  2 x 10  5 plates     SL EXTERNAL ROTATION   3# 2 x 10               Shoulder Manual x 0 minutes     PROM with End Range Stretch  x   Capsular Mobilizations   x   Scapular PNF  X   Deep Tissue/Myofascial  X               Shoulder Modalities     Home Exercises Provided and Patient Education Provided     Education provided: HOME EXERCISE PROGRAM review    Written Home Exercises Provided: Patient instructed to cont prior HEP.  Exercises were reviewed and Tonya was able to demonstrate them prior to the end of the session.  Tonya demonstrated good  understanding of the education provided.     See EMR under Patient Instructions for exercises provided prior visit.    Assessment     ACTIVE RANGE OF MOTION: Flexion= 135 degrees, Abduction = 110 degrees, Internal rotation= L2 and External rotation= 70 degrees    Patient has met all her goals with motion in functional range. Patient will see Dr. Poon today after therapy. Patient was able to increase weight on Cybex shoulder press and lat pull downs today. Will discharge with patient instructed to continue with home program. Patient has pulleys and resistive bands at home. All questions answered.       PMH:  Tonya is a 83 y.o. female referred to outpatient Physical Therapy with a medical diagnosis of Closed fracture of head of right humerus, initial encounter. Patient presents with Right shoulder pain, decreased shoulder motion, abnormal posture, scapular and shoulder weakness. Patient's right shoulder was immobilized for 9 weeks with patient very limited in flexion and abduction. Patient instructed in home program today with handouts provided. Patient introduced to pulleys with instructions to order for home use. Patient had moderate pain with range of motion but patient tolerated exercises well today. Patient will benefit from skilled Physical Therapy intervention to address all deficits  and help patient to return to their prior level of function.      Tonya Is progressing well towards her goals.   Pt prognosis is Good.     Pt will continue to benefit from skilled outpatient physical therapy to address the deficits listed in the problem list box on initial evaluation, provide pt/family education and to maximize pt's level of independence in the home and community environment.     Pt's spiritual, cultural and educational needs considered and pt agreeable to plan of care and goals.     Anticipated barriers to physical therapy: None    Goals :   Short Term Goals : 4 weeks  Independent with Home Exercise Program : Met  Increase Right Shoulder Flexion and Abduction Passive Range of Motion to WITHIN FUNCTIONAL LIMITS : Met  Increase Right Shoulder Internal Rotation and External Rotation Range of Motion to WITHIN FUNCTIONAL LIMITS : Met  Increase Right Scapular Strength to 4+/5 : Met  Decrease complaints of Right Shoulder Pain to 4/10 with Activity : Met     Long Term Goals : 8 weeks  1.Patient will perform 30 minutes of Activity with use of Right Shoulder with Pain Less than or Equal to 2/10 : Met  2. Increase Active Range of Motion to Within Functional Limits : Met  3. Increase Right Scapular Strength to 5/5 : Met  4.  Increase Right Shoulder Strength to 4/5 or greater : Met through available range of motion.    Discharge reason: Patient has completed the physician's prescription and Patient has reached the maximum rehab potential for the present time    Plan   This patient is discharged from Physical Therapy.    Date of Last visit: 5/9/2024  Total Visits Received: 13  Cancelled Visits: 3  No Show Visits: 0    MAGDALENA DELCID, PT

## 2024-06-21 DIAGNOSIS — M54.50 CHRONIC BILATERAL LOW BACK PAIN WITHOUT SCIATICA: Primary | ICD-10-CM

## 2024-06-21 DIAGNOSIS — G89.29 CHRONIC BILATERAL LOW BACK PAIN WITHOUT SCIATICA: Primary | ICD-10-CM

## 2024-06-21 RX ORDER — TIZANIDINE 4 MG/1
4 TABLET ORAL EVERY 6 HOURS PRN
Qty: 30 TABLET | Refills: 0 | Status: SHIPPED | OUTPATIENT
Start: 2024-06-21

## 2024-06-21 RX ORDER — TIZANIDINE 4 MG/1
4 TABLET ORAL EVERY 6 HOURS PRN
COMMUNITY
End: 2024-06-21 | Stop reason: SDUPTHER

## 2024-07-09 DIAGNOSIS — Z71.89 COMPLEX CARE COORDINATION: ICD-10-CM

## 2024-07-30 ENCOUNTER — OFFICE VISIT (OUTPATIENT)
Dept: FAMILY MEDICINE | Facility: CLINIC | Age: 84
End: 2024-07-30
Payer: MEDICARE

## 2024-07-30 ENCOUNTER — APPOINTMENT (OUTPATIENT)
Dept: RADIOLOGY | Facility: CLINIC | Age: 84
End: 2024-07-30
Attending: FAMILY MEDICINE
Payer: MEDICARE

## 2024-07-30 VITALS
SYSTOLIC BLOOD PRESSURE: 148 MMHG | TEMPERATURE: 99 F | BODY MASS INDEX: 29.77 KG/M2 | OXYGEN SATURATION: 97 % | RESPIRATION RATE: 16 BRPM | WEIGHT: 201 LBS | HEIGHT: 69 IN | DIASTOLIC BLOOD PRESSURE: 72 MMHG | HEART RATE: 59 BPM

## 2024-07-30 DIAGNOSIS — R07.81 RIB PAIN ON RIGHT SIDE: Primary | ICD-10-CM

## 2024-07-30 DIAGNOSIS — R07.81 RIB PAIN ON RIGHT SIDE: ICD-10-CM

## 2024-07-30 PROCEDURE — 99213 OFFICE O/P EST LOW 20 MIN: CPT | Mod: ,,, | Performed by: FAMILY MEDICINE

## 2024-07-30 PROCEDURE — 71100 X-RAY EXAM RIBS UNI 2 VIEWS: CPT | Mod: 26,RT,, | Performed by: RADIOLOGY

## 2024-07-30 PROCEDURE — 71100 X-RAY EXAM RIBS UNI 2 VIEWS: CPT | Mod: TC,RHCUB,RT | Performed by: FAMILY MEDICINE

## 2024-07-30 NOTE — PROGRESS NOTES
Noe Haile MD        PATIENT NAME: Tonya Sandoval  : 1940  DATE: 24  MRN: [id number]      Billing Provider: Noe Haile MD  Level of Service: IN OFFICE/OUTPT VISIT, EST, LEVL III, 20-29 MIN  Patient PCP Information       Provider PCP Type    Noe Haile MD General            Reason for Visit / Chief Complaint: Fall (Had fall about a week ago pain right ribs)       Update PCP  Update Chief Complaint         History of Present Illness / Problem Focused Workflow     Tonya Sandoval presents to the clinic with Fall (Had fall about a week ago pain right ribs)     Fell on ladder one week ago while picking figs.      Fall  Pertinent negatives include no abdominal pain, fever, headaches, hematuria or nausea.       Review of Systems     Review of Systems   Constitutional:  Negative for activity change, appetite change, fever and unexpected weight change.   HENT:  Negative for congestion, rhinorrhea, sinus pressure, sinus pain, sore throat and trouble swallowing.    Eyes:  Negative for photophobia, pain, discharge and visual disturbance.   Respiratory:  Negative for cough, chest tightness, wheezing and stridor.    Cardiovascular:  Positive for chest pain. Negative for palpitations and leg swelling.   Gastrointestinal:  Negative for abdominal pain, blood in stool, constipation, diarrhea and nausea.   Endocrine: Negative for polydipsia, polyphagia and polyuria.   Genitourinary:  Negative for difficulty urinating, flank pain and hematuria.   Musculoskeletal:  Negative for arthralgias and neck pain.   Skin:  Negative for rash.   Allergic/Immunologic: Negative for food allergies.   Neurological:  Negative for dizziness, tremors, seizures, syncope, weakness (global weakness) and headaches.   Psychiatric/Behavioral:  Negative for behavioral problems, confusion, decreased concentration, dysphoric mood and hallucinations. The patient is not nervous/anxious.         Medical / Social / Family  History     Past Medical History:   Diagnosis Date    Atrial fibrillation     GERD (gastroesophageal reflux disease)     Hyperlipidemia     Hypertension     OA (osteoarthritis)        Past Surgical History:   Procedure Laterality Date    CARDIAC CATHETERIZATION      HYSTERECTOMY         Social History  Ms.  reports that she has never smoked. She has never been exposed to tobacco smoke. She has never used smokeless tobacco. She reports that she does not drink alcohol and does not use drugs.    Family History  Ms.'s family history includes Brain cancer in her father; Cancer in her sister; Heart disease in her brother.    Medications and Allergies     Medications  No outpatient medications have been marked as taking for the 7/30/24 encounter (Office Visit) with Noe Haile MD.       Allergies  Review of patient's allergies indicates:   Allergen Reactions    Codeine     Nickel sutures [surgical stainless steel]        Physical Examination     Vitals:    07/30/24 0817   BP: (!) 148/72   Pulse: (!) 59   Resp: 16   Temp: 98.5 °F (36.9 °C)     Physical Exam  Constitutional:       General: She is not in acute distress.     Appearance: Normal appearance.   HENT:      Head: Normocephalic.      Right Ear: Tympanic membrane and ear canal normal.      Left Ear: Tympanic membrane and ear canal normal.      Nose: Nose normal.      Mouth/Throat:      Mouth: Mucous membranes are moist.      Pharynx: No oropharyngeal exudate.   Eyes:      Extraocular Movements: Extraocular movements intact.      Pupils: Pupils are equal, round, and reactive to light.   Cardiovascular:      Rate and Rhythm: Normal rate and regular rhythm.      Heart sounds: No murmur heard.  Pulmonary:      Effort: Pulmonary effort is normal.      Breath sounds: Normal breath sounds. No wheezing.   Chest:      Chest wall: Tenderness present.   Abdominal:      General: Abdomen is flat. Bowel sounds are normal.      Palpations: Abdomen is soft.      Hernia: No  hernia is present.   Musculoskeletal:         General: Normal range of motion.      Cervical back: Normal range of motion and neck supple.      Right lower leg: No edema.      Left lower leg: No edema.   Lymphadenopathy:      Cervical: No cervical adenopathy.   Skin:     General: Skin is warm and dry.      Coloration: Skin is not jaundiced.      Findings: No lesion.   Neurological:      General: No focal deficit present.      Mental Status: She is alert and oriented to person, place, and time.      Cranial Nerves: No cranial nerve deficit.      Gait: Gait normal.   Psychiatric:         Mood and Affect: Mood normal.         Behavior: Behavior normal.         Judgment: Judgment normal.          Assessment and Plan (including Health Maintenance)      Problem List  Smart Sets  Document Outside HM   :    Plan:     1. Rib pain on right side    -     X-Ray Ribs 2 View Right; Future; Expected date: 07/30/2024          Health Maintenance Due   Topic Date Due    TETANUS VACCINE  Never done    DEXA Scan  Never done    Shingles Vaccine (1 of 2) Never done    RSV Vaccine (Age 60+ and Pregnant patients) (1 - 1-dose 60+ series) Never done    Pneumococcal Vaccines (Age 65+) (2 of 2 - PPSV23 or PCV20) 02/07/2018    COVID-19 Vaccine (5 - 2023-24 season) 09/01/2023       1. Rib pain on right side  -     X-Ray Ribs 2 View Right; Future; Expected date: 07/30/2024         Health Maintenance Topics with due status: Not Due       Topic Last Completion Date    Influenza Vaccine 02/24/2023    Lipid Panel 03/15/2024       Future Appointments   Date Time Provider Department Center   9/18/2024  8:10 AM Noe Haile MD [facility]        There are no Patient Instructions on file for this visit.  Follow up if symptoms worsen or fail to improve.     Signature:  Noe Haile MD      Date of encounter: 7/30/24

## 2024-09-06 DIAGNOSIS — E78.5 HYPERLIPIDEMIA, UNSPECIFIED HYPERLIPIDEMIA TYPE: ICD-10-CM

## 2024-09-06 DIAGNOSIS — K21.9 GASTROESOPHAGEAL REFLUX DISEASE, UNSPECIFIED WHETHER ESOPHAGITIS PRESENT: ICD-10-CM

## 2024-09-06 DIAGNOSIS — I10 HYPERTENSION, UNSPECIFIED TYPE: ICD-10-CM

## 2024-09-06 RX ORDER — ATORVASTATIN CALCIUM 20 MG/1
20 TABLET, FILM COATED ORAL
Qty: 90 TABLET | Refills: 3 | Status: SHIPPED | OUTPATIENT
Start: 2024-09-06

## 2024-09-06 RX ORDER — LANSOPRAZOLE 30 MG/1
30 CAPSULE, DELAYED RELEASE ORAL
Qty: 90 CAPSULE | Refills: 3 | Status: SHIPPED | OUTPATIENT
Start: 2024-09-06

## 2024-09-06 RX ORDER — OLMESARTAN MEDOXOMIL AND HYDROCHLOROTHIAZIDE 40/25 40; 25 MG/1; MG/1
1 TABLET ORAL
Qty: 90 TABLET | Refills: 3 | Status: SHIPPED | OUTPATIENT
Start: 2024-09-06

## 2024-09-06 RX ORDER — METOPROLOL SUCCINATE 25 MG/1
25 TABLET, EXTENDED RELEASE ORAL
Qty: 90 TABLET | Refills: 3 | Status: SHIPPED | OUTPATIENT
Start: 2024-09-06

## 2024-09-18 ENCOUNTER — OFFICE VISIT (OUTPATIENT)
Dept: FAMILY MEDICINE | Facility: CLINIC | Age: 84
End: 2024-09-18
Payer: MEDICARE

## 2024-09-18 VITALS
WEIGHT: 199 LBS | RESPIRATION RATE: 20 BRPM | DIASTOLIC BLOOD PRESSURE: 70 MMHG | BODY MASS INDEX: 29.47 KG/M2 | HEART RATE: 64 BPM | SYSTOLIC BLOOD PRESSURE: 130 MMHG | OXYGEN SATURATION: 96 % | HEIGHT: 69 IN

## 2024-09-18 DIAGNOSIS — I10 PRIMARY HYPERTENSION: ICD-10-CM

## 2024-09-18 DIAGNOSIS — E78.5 HYPERLIPIDEMIA, UNSPECIFIED HYPERLIPIDEMIA TYPE: ICD-10-CM

## 2024-09-18 DIAGNOSIS — I48.19 PERSISTENT ATRIAL FIBRILLATION: Primary | ICD-10-CM

## 2024-09-18 PROCEDURE — 99214 OFFICE O/P EST MOD 30 MIN: CPT | Mod: ,,, | Performed by: FAMILY MEDICINE

## 2024-09-18 NOTE — PROGRESS NOTES
Noe Haile MD        PATIENT NAME: Tonya Sandoval  : 1940  DATE: 24  MRN: [id number]      Billing Provider: Noe Haile MD  Level of Service: AK OFFICE/OUTPT VISIT, EST, LEVL IV, 30-39 MIN  Patient PCP Information       Provider PCP Type    Noe Haile MD General            Reason for Visit / Chief Complaint: Hypertension (Check up)       Update PCP  Update Chief Complaint         History of Present Illness / Problem Focused Workflow     Tonya Sandoval presents to the clinic with Hypertension (Check up)     Routine followup.  No significant interval change.    Hypertension  Pertinent negatives include no chest pain, headaches, neck pain or palpitations.       Review of Systems     Review of Systems   Constitutional:  Negative for activity change, appetite change, fever and unexpected weight change.   HENT:  Negative for congestion, rhinorrhea, sinus pressure, sinus pain, sore throat and trouble swallowing.    Eyes:  Negative for photophobia, pain, discharge and visual disturbance.   Respiratory:  Negative for cough, chest tightness, wheezing and stridor.    Cardiovascular:  Negative for chest pain, palpitations and leg swelling.   Gastrointestinal:  Negative for abdominal pain, blood in stool, constipation, diarrhea and nausea.   Endocrine: Negative for polydipsia, polyphagia and polyuria.   Genitourinary:  Negative for difficulty urinating, flank pain and hematuria.   Musculoskeletal:  Negative for arthralgias and neck pain.   Skin:  Negative for rash.   Allergic/Immunologic: Negative for food allergies.   Neurological:  Negative for dizziness, tremors, seizures, syncope, weakness (global weakness) and headaches.   Psychiatric/Behavioral:  Negative for behavioral problems, confusion, decreased concentration, dysphoric mood and hallucinations. The patient is not nervous/anxious.         Medical / Social / Family History     Past Medical History:   Diagnosis Date    Atrial  fibrillation     GERD (gastroesophageal reflux disease)     Hyperlipidemia     Hypertension     OA (osteoarthritis)        Past Surgical History:   Procedure Laterality Date    CARDIAC CATHETERIZATION      HYSTERECTOMY         Social History  Ms.  reports that she has never smoked. She has never been exposed to tobacco smoke. She has never used smokeless tobacco. She reports that she does not drink alcohol and does not use drugs.    Family History  Ms.'s family history includes Brain cancer in her father; Cancer in her sister; Heart disease in her brother.    Medications and Allergies     Medications  No outpatient medications have been marked as taking for the 9/18/24 encounter (Office Visit) with Noe Haile MD.       Allergies  Review of patient's allergies indicates:   Allergen Reactions    Codeine     Nickel sutures [surgical stainless steel]        Physical Examination     Vitals:    09/18/24 0834   BP: 130/70   Pulse:    Resp:      Physical Exam  Constitutional:       General: She is not in acute distress.     Appearance: Normal appearance.   HENT:      Head: Normocephalic.      Right Ear: Tympanic membrane and ear canal normal.      Left Ear: Tympanic membrane and ear canal normal.      Nose: Nose normal.      Mouth/Throat:      Mouth: Mucous membranes are moist.      Pharynx: No oropharyngeal exudate.   Eyes:      Extraocular Movements: Extraocular movements intact.      Pupils: Pupils are equal, round, and reactive to light.   Cardiovascular:      Rate and Rhythm: Normal rate and regular rhythm.      Heart sounds: No murmur heard.  Pulmonary:      Effort: Pulmonary effort is normal.      Breath sounds: Normal breath sounds. No wheezing.   Abdominal:      General: Abdomen is flat. Bowel sounds are normal.      Palpations: Abdomen is soft.      Hernia: No hernia is present.   Musculoskeletal:         General: Normal range of motion.      Cervical back: Normal range of motion and neck supple.      Right  lower leg: No edema.      Left lower leg: No edema.   Lymphadenopathy:      Cervical: No cervical adenopathy.   Skin:     General: Skin is warm and dry.      Coloration: Skin is not jaundiced.      Findings: No lesion.   Neurological:      General: No focal deficit present.      Mental Status: She is alert and oriented to person, place, and time.      Cranial Nerves: No cranial nerve deficit.      Gait: Gait normal.   Psychiatric:         Mood and Affect: Mood normal.         Behavior: Behavior normal.         Judgment: Judgment normal.          Assessment and Plan (including Health Maintenance)      Problem List  Smart Sets  Document Outside HM   :    Plan:           Health Maintenance Due   Topic Date Due    TETANUS VACCINE  Never done    DEXA Scan  Never done    Shingles Vaccine (1 of 2) Never done    RSV Vaccine (Age 60+ and Pregnant patients) (1 - 1-dose 60+ series) Never done    Pneumococcal Vaccines (Age 65+) (2 of 2 - PPSV23 or PCV20) 02/07/2018    Influenza Vaccine (1) 09/01/2024       1. Persistent atrial fibrillation  Overview:  Patient had gyno bleeding while on Xarelto and has declined to try other anticoagulants.       2. Hyperlipidemia, unspecified hyperlipidemia type    3. Primary hypertension         Health Maintenance Topics with due status: Not Due       Topic Last Completion Date    Lipid Panel 09/16/2024       Future Appointments   Date Time Provider Department Center   9/23/2024  9:00 AM Reyna Durán ACNP [facility]   11/26/2024  8:00 AM [facility]        There are no Patient Instructions on file for this visit.  Follow up in about 6 months (around 3/18/2025) for routine followup.     Signature:  Noe Haile MD      Date of encounter: 9/18/24

## 2024-09-23 ENCOUNTER — OFFICE VISIT (OUTPATIENT)
Dept: CARDIOLOGY | Facility: CLINIC | Age: 84
End: 2024-09-23
Payer: MEDICARE

## 2024-09-23 VITALS
BODY MASS INDEX: 30.12 KG/M2 | WEIGHT: 203.38 LBS | HEART RATE: 70 BPM | SYSTOLIC BLOOD PRESSURE: 128 MMHG | DIASTOLIC BLOOD PRESSURE: 72 MMHG | OXYGEN SATURATION: 96 % | HEIGHT: 69 IN

## 2024-09-23 DIAGNOSIS — I48.91 ATRIAL FIBRILLATION, UNSPECIFIED TYPE: Primary | ICD-10-CM

## 2024-09-23 DIAGNOSIS — E78.5 HYPERLIPIDEMIA, UNSPECIFIED HYPERLIPIDEMIA TYPE: ICD-10-CM

## 2024-09-23 DIAGNOSIS — I10 HYPERTENSION, UNSPECIFIED TYPE: ICD-10-CM

## 2024-09-23 PROCEDURE — 99214 OFFICE O/P EST MOD 30 MIN: CPT | Mod: PBBFAC,25 | Performed by: NURSE PRACTITIONER

## 2024-09-23 PROCEDURE — 99999 PR PBB SHADOW E&M-EST. PATIENT-LVL IV: CPT | Mod: PBBFAC,,, | Performed by: NURSE PRACTITIONER

## 2024-09-23 PROCEDURE — 93010 ELECTROCARDIOGRAM REPORT: CPT | Mod: S$PBB,,, | Performed by: INTERNAL MEDICINE

## 2024-09-23 PROCEDURE — 99214 OFFICE O/P EST MOD 30 MIN: CPT | Mod: S$PBB,,, | Performed by: NURSE PRACTITIONER

## 2024-09-23 PROCEDURE — 93005 ELECTROCARDIOGRAM TRACING: CPT | Mod: PBBFAC | Performed by: INTERNAL MEDICINE

## 2024-09-23 NOTE — ASSESSMENT & PLAN NOTE
Lab Results   Component Value Date    CHOL 141 09/16/2024    CHOL 152 03/15/2024    CHOL 140 08/14/2023     Lab Results   Component Value Date    HDL 47 09/16/2024    HDL 47 03/15/2024    HDL 50 08/14/2023     Lab Results   Component Value Date    LDLCALC 68 09/16/2024    LDLCALC 77 03/15/2024    LDLCALC 67 08/14/2023     Lab Results   Component Value Date    TRIG 129 09/16/2024    TRIG 141 03/15/2024    TRIG 116 08/14/2023       Lab Results   Component Value Date    CHOLHDL 3.0 09/16/2024    CHOLHDL 3.2 03/15/2024    CHOLHDL 2.8 08/14/2023     Lipitor 20 mg po daily  Lipids followed by PCP

## 2024-09-23 NOTE — ASSESSMENT & PLAN NOTE
Patient has history of gyno bleeding while taking Xarelto.  She declined to try any other anticoagulants.  We spoke regarding potential for Watchman implantation.  The patient is familiar with the Watchman procedure but but declines referral to be considered for Watchman.   Continue ASA for CVA prophylaxis per patient 's wishes. She understands the risk of this decision.

## 2024-09-23 NOTE — PROGRESS NOTES
PCP: Noe Haile MD    Referring Provider:     Subjective:   Tonya Sandoval is a 84 y.o. female with hx of persistent atrial fibrillation, GERD, hyperlipidemia, hypertension, and osteoarthritis, who presents for routine follow-up.  The patient states she is doing well and denies complaints.        Fhx:  Family History   Problem Relation Name Age of Onset    Brain cancer Father      Cancer Sister      Heart disease Brother       Shx:   Social History     Socioeconomic History    Marital status: Single   Tobacco Use    Smoking status: Never     Passive exposure: Never    Smokeless tobacco: Never   Substance and Sexual Activity    Alcohol use: Never    Drug use: Never    Sexual activity: Not Currently       EKG   9/23/2024 atrial fibrillation with HR 64 bpm; with a competing junctional pacemaker; left axis deviation; septal infarct (cited on or before 10/18/2023); when compared with EKG 10/18/2023 no significant change was found.   10/18/2023 atrial fibrillation with slow ventricular response, heart rate 54 beats per minute, right superior axis deviation, pulmonary disease pattern, septal Q-waves also present on previous EKG  10/18/2022 atrial fibrillation with ventricular rate of 58 beats per minute unchanged from previously tracing       ECHO No results found for this or any previous visit.    [facility] 07/01/2014   1. Normal right-dominant coronary arteries free of significant coronary artery disease   2. Normal LV size and systolic function  3. No significant mitral regurgitation       Lab Results   Component Value Date     09/16/2024    K 4.0 09/16/2024     09/16/2024    CO2 27 09/16/2024    BUN 27 (H) 09/16/2024    CREATININE 0.95 09/16/2024    CALCIUM 9.3 09/16/2024    ANIONGAP 10 09/16/2024    EGFRNONAA 61 02/23/2022       Lab Results   Component Value Date    CHOL 141 09/16/2024    CHOL 152 03/15/2024    CHOL 140 08/14/2023     Lab Results   Component Value Date    HDL 47 09/16/2024    HDL 47  "03/15/2024    HDL 50 08/14/2023     Lab Results   Component Value Date    LDLCALC 68 09/16/2024    LDLCALC 77 03/15/2024    LDLCALC 67 08/14/2023     Lab Results   Component Value Date    TRIG 129 09/16/2024    TRIG 141 03/15/2024    TRIG 116 08/14/2023     Lab Results   Component Value Date    CHOLHDL 3.0 09/16/2024    CHOLHDL 3.2 03/15/2024    CHOLHDL 2.8 08/14/2023       Lab Results   Component Value Date    WBC 7.75 09/16/2024    HGB 13.5 09/16/2024    HCT 42.8 09/16/2024    MCV 91.1 09/16/2024     09/16/2024           Current Outpatient Medications:     ALPRAZolam (XANAX) 0.25 MG tablet, Take 1 tablet (0.25 mg total) by mouth 3 (three) times daily., Disp: 30 tablet, Rfl: 5    aspirin 325 MG tablet, Take 81 mg by mouth once daily., Disp: , Rfl:     atorvastatin (LIPITOR) 20 MG tablet, TAKE 1 TABLET DAILY, Disp: 90 tablet, Rfl: 3    ibuprofen (ADVIL,MOTRIN) 800 MG tablet, Take 1 tablet (800 mg total) by mouth 3 (three) times daily., Disp: 20 tablet, Rfl: 0    lansoprazole (PREVACID) 30 MG capsule, TAKE 1 CAPSULE DAILY, Disp: 90 capsule, Rfl: 3    metoprolol succinate (TOPROL-XL) 25 MG 24 hr tablet, TAKE 1 TABLET DAILY, Disp: 90 tablet, Rfl: 3    olmesartan-hydrochlorothiazide (BENICAR HCT) 40-25 mg per tablet, TAKE 1 TABLET DAILY, Disp: 90 tablet, Rfl: 3    tiZANidine (ZANAFLEX) 4 MG tablet, Take 1 tablet (4 mg total) by mouth every 6 (six) hours as needed (as needed)., Disp: 30 tablet, Rfl: 0  Meds reviewed but not reconciled.  She did not bring her medications today.      Review of Systems   Respiratory:  Negative for shortness of breath.    Cardiovascular:  Negative for chest pain, palpitations, orthopnea, claudication, leg swelling and PND.   Neurological:  Negative for dizziness, loss of consciousness and weakness.           Objective:   /72 (BP Location: Left arm, Patient Position: Sitting)   Pulse 70   Ht 5' 9" (1.753 m)   Wt 92.3 kg (203 lb 6.4 oz)   LMP  (LMP Unknown)   SpO2 96%   BMI " 30.04 kg/m²     Physical Exam  Vitals and nursing note reviewed.   Constitutional:       Appearance: Normal appearance. She is obese.   HENT:      Head: Normocephalic and atraumatic.   Neck:      Vascular: No carotid bruit.   Cardiovascular:      Rate and Rhythm: Normal rate. Rhythm irregular.      Pulses: Normal pulses.      Heart sounds: Normal heart sounds.   Pulmonary:      Effort: Pulmonary effort is normal.      Breath sounds: Normal breath sounds.   Abdominal:      Palpations: Abdomen is soft.   Musculoskeletal:      Cervical back: Neck supple.      Right lower leg: No edema.      Left lower leg: No edema.   Skin:     General: Skin is warm and dry.      Capillary Refill: Capillary refill takes less than 2 seconds.   Neurological:      General: No focal deficit present.      Mental Status: She is alert.           Assessment:     1. Atrial fibrillation, unspecified type  EKG 12-lead    EKG 12-lead      2. Hypertension, unspecified type        3. Hyperlipidemia, unspecified hyperlipidemia type              Plan:   Hypertension  128/72 mmHg    Hyperlipidemia  Lab Results   Component Value Date    CHOL 141 09/16/2024    CHOL 152 03/15/2024    CHOL 140 08/14/2023     Lab Results   Component Value Date    HDL 47 09/16/2024    HDL 47 03/15/2024    HDL 50 08/14/2023     Lab Results   Component Value Date    LDLCALC 68 09/16/2024    LDLCALC 77 03/15/2024    LDLCALC 67 08/14/2023     Lab Results   Component Value Date    TRIG 129 09/16/2024    TRIG 141 03/15/2024    TRIG 116 08/14/2023       Lab Results   Component Value Date    CHOLHDL 3.0 09/16/2024    CHOLHDL 3.2 03/15/2024    CHOLHDL 2.8 08/14/2023     Lipitor 20 mg po daily  Lipids followed by PCP    Atrial fibrillation  Patient has history of gyno bleeding while taking Xarelto.  She declined to try any other anticoagulants.  We spoke regarding potential for Watchman implantation.  The patient is familiar with the Watchman procedure but but declines referral to be  considered for Watchman.   Continue ASA for CVA prophylaxis per patient 's wishes. She understands the risk of this decision.        Return to clinic in 1 year

## 2024-09-24 LAB
OHS QRS DURATION: 82 MS
OHS QTC CALCULATION: 414 MS

## 2024-12-30 DIAGNOSIS — G89.29 CHRONIC BILATERAL LOW BACK PAIN WITHOUT SCIATICA: ICD-10-CM

## 2024-12-30 DIAGNOSIS — M54.50 CHRONIC BILATERAL LOW BACK PAIN WITHOUT SCIATICA: ICD-10-CM

## 2024-12-30 RX ORDER — TIZANIDINE 4 MG/1
4 TABLET ORAL EVERY 6 HOURS PRN
Qty: 90 TABLET | Refills: 11 | Status: SHIPPED | OUTPATIENT
Start: 2024-12-30

## 2025-01-01 ENCOUNTER — HOSPITAL ENCOUNTER (EMERGENCY)
Facility: HOSPITAL | Age: 85
Discharge: HOME | End: 2025-01-01
Payer: MEDICARE

## 2025-01-01 VITALS
DIASTOLIC BLOOD PRESSURE: 80 MMHG | WEIGHT: 203 LBS | RESPIRATION RATE: 19 BRPM | BODY MASS INDEX: 30.07 KG/M2 | SYSTOLIC BLOOD PRESSURE: 169 MMHG | TEMPERATURE: 98 F | HEIGHT: 69 IN | HEART RATE: 98 BPM | OXYGEN SATURATION: 97 %

## 2025-01-01 DIAGNOSIS — K59.00 CONSTIPATION, UNSPECIFIED CONSTIPATION TYPE: Primary | ICD-10-CM

## 2025-01-01 PROCEDURE — 99284 EMERGENCY DEPT VISIT MOD MDM: CPT | Mod: 25

## 2025-01-01 PROCEDURE — 25000003 PHARM REV CODE 250: Performed by: NURSE PRACTITIONER

## 2025-01-01 RX ORDER — DOCUSATE SODIUM 100 MG/1
100 CAPSULE, LIQUID FILLED ORAL 2 TIMES DAILY PRN
Qty: 60 CAPSULE | Refills: 0 | Status: SHIPPED | OUTPATIENT
Start: 2025-01-01

## 2025-01-01 RX ORDER — PSEUDOEPHEDRINE/ACETAMINOPHEN 30MG-500MG
100 TABLET ORAL
Status: COMPLETED | OUTPATIENT
Start: 2025-01-01 | End: 2025-01-01

## 2025-01-01 RX ORDER — POLYETHYLENE GLYCOL 3350 17 G/17G
17 POWDER, FOR SOLUTION ORAL DAILY
Qty: 7 EACH | Refills: 0 | Status: SHIPPED | OUTPATIENT
Start: 2025-01-01

## 2025-01-01 RX ORDER — SYRING-NEEDL,DISP,INSUL,0.3 ML 29 G X1/2"
296 SYRINGE, EMPTY DISPOSABLE MISCELLANEOUS
Status: COMPLETED | OUTPATIENT
Start: 2025-01-01 | End: 2025-01-01

## 2025-01-01 RX ADMIN — Medication 100 ML: at 10:01

## 2025-01-01 RX ADMIN — SODIUM CHLORIDE 500 ML: 9 INJECTION, SOLUTION INTRAVENOUS at 10:01

## 2025-01-01 RX ADMIN — MAGNESIUM CITRATE 296 ML: 1.75 LIQUID ORAL at 10:01

## 2025-01-01 NOTE — ED TRIAGE NOTES
Tonya Baileyisidro is a 84 y.o. female presents to the Emergency Department with Chief Complaint of Constipation x 7 days.  Respirations are even and unlabored. Alert and oriented x 3. GCS 15. Airway patent, trachea midline. Skin warm, dry, and appropriate to ethnicity. Patient placed on bedside cardiac monitor with alarms audible. Call light within patients reach. Awaiting provider to evaluate patient. Temi Castaneda RN

## 2025-01-01 NOTE — ED PROVIDER NOTES
Encounter Date: 1/1/2025       History     Chief Complaint   Patient presents with    Constipation     84-year-old female presents to the emergency department to be evaluated for constipation.  She reports her last bowel movement was 7 days ago.  She has had some mild nausea but no vomiting.  She is passing gas.    The history is provided by the patient.   Constipation   The current episode started several days ago. Associated symptoms include nausea. Pertinent negatives include no anorexia, no fever, no abdominal pain, no diarrhea, no hematemesis, no hemorrhoids, no rectal pain, no vomiting, no hematuria, no vaginal bleeding, no vaginal discharge, no chest pain, no headaches, no coughing, no difficulty breathing and no rash.     Review of patient's allergies indicates:   Allergen Reactions    Codeine     Nickel sutures [surgical stainless steel]      Past Medical History:   Diagnosis Date    Atrial fibrillation     GERD (gastroesophageal reflux disease)     Hyperlipidemia     Hypertension     OA (osteoarthritis)      Past Surgical History:   Procedure Laterality Date    CARDIAC CATHETERIZATION      HYSTERECTOMY       Family History   Problem Relation Name Age of Onset    Brain cancer Father      Cancer Sister      Heart disease Brother       Social History     Tobacco Use    Smoking status: Never     Passive exposure: Never    Smokeless tobacco: Never   Substance Use Topics    Alcohol use: Never    Drug use: Never     Review of Systems   Constitutional:  Negative for chills and fever.   Respiratory:  Negative for cough.    Cardiovascular:  Negative for chest pain.   Gastrointestinal:  Positive for constipation and nausea. Negative for abdominal distention, abdominal pain, anal bleeding, anorexia, blood in stool, diarrhea, hematemesis, hemorrhoids, rectal pain and vomiting.   Genitourinary:  Negative for hematuria, vaginal bleeding and vaginal discharge.   Skin:  Negative for rash.   Neurological:  Negative for  headaches.   All other systems reviewed and are negative.      Physical Exam     Initial Vitals [01/01/25 0844]   BP Pulse Resp Temp SpO2   (!) 172/108 (!) 58 18 98.2 °F (36.8 °C) 96 %      MAP       --         Physical Exam    Vitals reviewed.  Constitutional: She appears well-developed and well-nourished.   Neck: Neck supple.   Cardiovascular:  Normal rate and regular rhythm.           Pulmonary/Chest: Breath sounds normal.   Abdominal: Abdomen is soft. Bowel sounds are normal. She exhibits no distension and no mass. There is no abdominal tenderness. There is no rebound and no guarding.   Musculoskeletal:         General: Normal range of motion.      Cervical back: Neck supple.     Neurological: She is alert and oriented to person, place, and time. She has normal strength. GCS score is 15. GCS eye subscore is 4. GCS verbal subscore is 5. GCS motor subscore is 6.   Skin: Skin is warm and dry. Capillary refill takes less than 2 seconds.   Psychiatric: She has a normal mood and affect.         Medical Screening Exam   See Full Note    ED Course   Procedures  Labs Reviewed - No data to display       Imaging Results              XR ABDOMEN, ACUTE 2 OR MORE VIEWS WITH CHEST (In process)                      Medications   glycerin 99.5% topical solution 100 mL (100 mLs Rectal Given 1/1/25 1014)     And   magnesium citrate solution 296 mL (296 mLs Rectal Given 1/1/25 1014)     And   sodium chloride 0.9% bolus 500 mL 500 mL (500 mLs Rectal New Bag 1/1/25 1014)     Medical Decision Making  84-year-old female presents to the emergency department to be evaluated for constipation.  She reports her last bowel movement was 7 days ago.  She has had some mild nausea but no vomiting.  She is passing gas.  Treated with enema  Labs ordered and reviewed  X-ray ordered, films reviewed significant for constipation  Diagnosis: Constipation  For to Gastroenterology  Prescribed MiraLax and Colace    Amount and/or Complexity of Data  Reviewed  Radiology: ordered.    Risk  OTC drugs.                                      Clinical Impression:   Final diagnoses:  [K59.00] Constipation, unspecified constipation type (Primary)        ED Disposition Condition    Discharge Stable          ED Prescriptions       Medication Sig Dispense Start Date End Date Auth. Provider    polyethylene glycol (GLYCOLAX) 17 gram/dose powder Take 17 g by mouth once daily. 7 each 1/1/2025 -- Larissa Lazar FNP    docusate sodium (COLACE) 100 MG capsule Take 1 capsule (100 mg total) by mouth 2 (two) times daily as needed. 60 capsule 1/1/2025 -- Larissa Lazar FNP          Follow-up Information    None          Larissa Lazar FNP  01/01/25 2455

## 2025-01-01 NOTE — DISCHARGE INSTRUCTIONS
Follow up with gastroenterology; you should be contacted with an appointment. Follow up with your primary care provider in 2 days. Return to the emergency department for any increase in symptoms or for any other new or worrisome symptoms.

## 2025-01-03 ENCOUNTER — OFFICE VISIT (OUTPATIENT)
Dept: FAMILY MEDICINE | Facility: CLINIC | Age: 85
End: 2025-01-03
Payer: MEDICARE

## 2025-01-03 VITALS
TEMPERATURE: 98 F | DIASTOLIC BLOOD PRESSURE: 54 MMHG | WEIGHT: 211 LBS | BODY MASS INDEX: 31.25 KG/M2 | HEART RATE: 58 BPM | SYSTOLIC BLOOD PRESSURE: 97 MMHG | OXYGEN SATURATION: 94 % | HEIGHT: 69 IN | RESPIRATION RATE: 18 BRPM

## 2025-01-03 DIAGNOSIS — M54.50 CHRONIC BILATERAL LOW BACK PAIN WITHOUT SCIATICA: Primary | ICD-10-CM

## 2025-01-03 DIAGNOSIS — I48.91 ATRIAL FIBRILLATION, UNSPECIFIED TYPE: ICD-10-CM

## 2025-01-03 DIAGNOSIS — G89.29 CHRONIC BILATERAL LOW BACK PAIN WITHOUT SCIATICA: Primary | ICD-10-CM

## 2025-01-03 DIAGNOSIS — K59.00 CONSTIPATION, UNSPECIFIED CONSTIPATION TYPE: ICD-10-CM

## 2025-01-03 DIAGNOSIS — Z12.11 COLON CANCER SCREENING: ICD-10-CM

## 2025-01-03 PROCEDURE — 99214 OFFICE O/P EST MOD 30 MIN: CPT | Mod: ,,, | Performed by: FAMILY MEDICINE

## 2025-01-03 RX ORDER — HYDROCODONE BITARTRATE AND ACETAMINOPHEN 5; 325 MG/1; MG/1
1 TABLET ORAL EVERY 6 HOURS PRN
Qty: 15 TABLET | Refills: 0 | Status: SHIPPED | OUTPATIENT
Start: 2025-01-03

## 2025-01-04 NOTE — PROGRESS NOTES
Noe Haile MD        PATIENT NAME: Tonya Sandoval  : 1940  DATE: 1/3/25  MRN: [id number]      Billing Provider: Noe Haile MD  Level of Service: TCM SERVICES (MODERATE COMPLEXITY)  Patient PCP Information       Provider PCP Type    Noe Haile MD General            Reason for Visit / Chief Complaint: Transitional Care (Went to hospital with constipation on Tuesday. Has had a bm since then. Now having back pain and is bloated, no appetite )       Update PCP  Update Chief Complaint         History of Present Illness / Problem Focused Workflow     Tonya Sandoval presents to the clinic with Transitional Care (Went to hospital with constipation on Tuesday. Has had a bm since then. Now having back pain and is bloated, no appetite )     Accompanied by a family member for follow-up of back pain and constipation.  She has use some oral magnesium citrate which is helped some.  In the past she successfully has use hydrocodone for flare-ups of back pain        Review of Systems     Review of Systems   Constitutional:  Negative for activity change, appetite change, fever and unexpected weight change.   HENT:  Negative for congestion, rhinorrhea, sinus pressure, sinus pain, sore throat and trouble swallowing.    Eyes:  Negative for photophobia, pain, discharge and visual disturbance.   Respiratory:  Negative for cough, chest tightness, wheezing and stridor.    Cardiovascular:  Negative for chest pain, palpitations and leg swelling.   Gastrointestinal:  Positive for constipation. Negative for abdominal pain, blood in stool, diarrhea and nausea.   Endocrine: Negative for polydipsia, polyphagia and polyuria.   Genitourinary:  Negative for difficulty urinating, flank pain and hematuria.   Musculoskeletal:  Positive for back pain. Negative for arthralgias and neck pain.   Skin:  Negative for rash.   Allergic/Immunologic: Negative for food allergies.   Neurological:  Negative for dizziness,  tremors, seizures, syncope, weakness (global weakness) and headaches.   Psychiatric/Behavioral:  Negative for behavioral problems, confusion, decreased concentration, dysphoric mood and hallucinations. The patient is not nervous/anxious.         Medical / Social / Family History     Past Medical History:   Diagnosis Date    Atrial fibrillation     GERD (gastroesophageal reflux disease)     Hyperlipidemia     Hypertension     OA (osteoarthritis)        Past Surgical History:   Procedure Laterality Date    CARDIAC CATHETERIZATION      HYSTERECTOMY         Social History  Ms.  reports that she has never smoked. She has never been exposed to tobacco smoke. She has never used smokeless tobacco. She reports that she does not drink alcohol and does not use drugs.    Family History  Ms.'s family history includes Brain cancer in her father; Cancer in her sister; Heart disease in her brother.    Medications and Allergies     Medications  No outpatient medications have been marked as taking for the 1/3/25 encounter (Office Visit) with Noe Haile MD.       Allergies  Review of patient's allergies indicates:   Allergen Reactions    Codeine     Nickel sutures [surgical stainless steel]        Physical Examination     Vitals:    01/03/25 0745   BP: (!) 97/54   Pulse: (!) 58   Resp: 18   Temp: 97.8 °F (36.6 °C)     Physical Exam  Constitutional:       General: She is not in acute distress.     Appearance: Normal appearance. She is ill-appearing.   HENT:      Head: Normocephalic.      Right Ear: Tympanic membrane and ear canal normal.      Left Ear: Tympanic membrane and ear canal normal.      Nose: Nose normal.      Mouth/Throat:      Mouth: Mucous membranes are moist.      Pharynx: No oropharyngeal exudate.   Eyes:      Extraocular Movements: Extraocular movements intact.      Pupils: Pupils are equal, round, and reactive to light.   Cardiovascular:      Rate and Rhythm: Normal rate and regular rhythm.      Heart sounds: No  murmur heard.  Pulmonary:      Effort: Pulmonary effort is normal.      Breath sounds: Normal breath sounds. No wheezing.   Abdominal:      General: Abdomen is flat. Bowel sounds are normal.      Palpations: Abdomen is soft.      Hernia: No hernia is present.   Musculoskeletal:         General: Normal range of motion.      Cervical back: Normal range of motion and neck supple.      Right lower leg: No edema.      Left lower leg: No edema.   Lymphadenopathy:      Cervical: No cervical adenopathy.   Skin:     General: Skin is warm and dry.      Coloration: Skin is not jaundiced.      Findings: No lesion.   Neurological:      General: No focal deficit present.      Mental Status: She is alert and oriented to person, place, and time.      Cranial Nerves: No cranial nerve deficit.      Gait: Gait normal.   Psychiatric:         Mood and Affect: Mood normal.         Behavior: Behavior normal.         Judgment: Judgment normal.          Assessment and Plan (including Health Maintenance)      Problem List  Smart Sets  Document Outside HM   :    Plan:     1. Chronic bilateral low back pain without sciatica    -     HYDROcodone-acetaminophen (NORCO) 5-325 mg per tablet; Take 1 tablet by mouth every 6 (six) hours as needed for Pain.  Dispense: 15 tablet; Refill: 0    2. Colon cancer screening    -     Colonoscopy; Future; Expected date: 01/03/2025    3. Atrial fibrillation, unspecified type  The current medical regimen is effective;  continue present plan and medications.  Overview:  Patient had gyno bleeding while on Xarelto and has declined to try other anticoagulants.             Health Maintenance Due   Topic Date Due    TETANUS VACCINE  Never done    DEXA Scan  Never done    Shingles Vaccine (1 of 2) Never done    RSV Vaccine (Age 60+ and Pregnant patients) (1 - 1-dose 75+ series) Never done    Pneumococcal Vaccines (Age 50+) (2 of 2 - PPSV23) 02/07/2018    Influenza Vaccine (1) 09/01/2024       1. Chronic bilateral low  back pain without sciatica  -     HYDROcodone-acetaminophen (NORCO) 5-325 mg per tablet; Take 1 tablet by mouth every 6 (six) hours as needed for Pain.  Dispense: 15 tablet; Refill: 0    2. Colon cancer screening  -     Colonoscopy; Future; Expected date: 01/03/2025    3. Atrial fibrillation, unspecified type  Overview:  Patient had gyno bleeding while on Xarelto and has declined to try other anticoagulants.       4. Constipation, unspecified constipation type         Health Maintenance Topics with due status: Not Due       Topic Last Completion Date    Lipid Panel 09/16/2024       Future Appointments   Date Time Provider Department Center   3/5/2025  1:00 PM Jaren Kingsley MD [facility]   3/18/2025 10:00 AM Noe Haile MD [facility]   9/23/2025 10:00 AM [facility]    Recommend she use MiraLax or Metamucil.  Follow up with us as needed.  We will use hydrocodone for flare-up of back pain.    There are no Patient Instructions on file for this visit.  Follow up in about 3 months (around 4/3/2025) for routine followup.     Signature:  Noe Haile MD      Date of encounter: 1/3/25

## 2025-01-09 ENCOUNTER — HOSPITAL ENCOUNTER (OUTPATIENT)
Dept: RADIOLOGY | Facility: HOSPITAL | Age: 85
Discharge: HOME | End: 2025-01-09
Payer: MEDICARE

## 2025-01-09 ENCOUNTER — OFFICE VISIT (OUTPATIENT)
Dept: FAMILY MEDICINE | Facility: CLINIC | Age: 85
End: 2025-01-09
Payer: MEDICARE

## 2025-01-09 VITALS
SYSTOLIC BLOOD PRESSURE: 138 MMHG | HEART RATE: 78 BPM | OXYGEN SATURATION: 96 % | HEIGHT: 69 IN | WEIGHT: 193 LBS | RESPIRATION RATE: 20 BRPM | BODY MASS INDEX: 28.58 KG/M2 | TEMPERATURE: 97 F | DIASTOLIC BLOOD PRESSURE: 72 MMHG

## 2025-01-09 DIAGNOSIS — K59.00 CONSTIPATION, UNSPECIFIED CONSTIPATION TYPE: Primary | ICD-10-CM

## 2025-01-09 DIAGNOSIS — K59.00 CONSTIPATION, UNSPECIFIED CONSTIPATION TYPE: ICD-10-CM

## 2025-01-09 PROCEDURE — 99214 OFFICE O/P EST MOD 30 MIN: CPT | Mod: ,,,

## 2025-01-09 PROCEDURE — 74018 RADEX ABDOMEN 1 VIEW: CPT | Mod: TC

## 2025-01-09 RX ORDER — LACTULOSE 10 G/15ML
20 SOLUTION ORAL DAILY PRN
Qty: 120 ML | Refills: 0 | Status: SHIPPED | OUTPATIENT
Start: 2025-01-09

## 2025-01-09 NOTE — PROGRESS NOTES
Subjective     Patient ID: Tonya Sandoval is a 84 y.o. female.    Chief Complaint: Constipation (Last time was New Years Day -  went to ER that day bc she hadnt gone in 1 week before that but did go a little bit after ER), Back Pain, and Flank Pain (right)    LOLI is an 84 year old female that presents today with her daughter for complaints of constipation, back pain, and upper abdominal pain. She was evaluated in the ED on 1/1/25 for complaints of constipation. She had an enema and was sent home with miralax and stool softeners. She denies having a bowel movement after enemas. Her daughter gave her a bottle of magnesium citrate. Patient notes she passed a small amount of brown liquid stool after. She has not had a bowel movement in 8 days. She is passing gas. She was given norco by her PCP for back pain. She is out of pain medication at present.     Constipation  Associated symptoms include abdominal pain and back pain. Pertinent negatives include no difficulty urinating or fever.   Back Pain  Associated symptoms include abdominal pain. Pertinent negatives include no bladder incontinence, chest pain, fever, headaches, numbness or weakness.   Flank Pain  Associated symptoms include abdominal pain. Pertinent negatives include no bladder incontinence, chest pain, fever, headaches, numbness or weakness.   Review of Systems   Constitutional:  Negative for activity change, appetite change, chills and fever.   HENT:  Negative for ear pain, hearing loss, trouble swallowing and voice change.    Eyes:  Negative for visual disturbance.   Respiratory:  Negative for apnea, cough, chest tightness and shortness of breath.    Cardiovascular:  Negative for chest pain, palpitations and leg swelling.   Gastrointestinal:  Positive for abdominal pain and constipation. Negative for blood in stool, change in bowel habit and reflux.   Genitourinary:  Positive for flank pain. Negative for bladder incontinence and difficulty  urinating.   Musculoskeletal:  Positive for back pain. Negative for gait problem, joint swelling and myalgias.   Integumentary:  Negative for color change and pallor.   Neurological:  Negative for dizziness, weakness, numbness and headaches.   Psychiatric/Behavioral:  Negative for sleep disturbance. The patient is not nervous/anxious.         Objective     Physical Exam  Vitals and nursing note reviewed.   Constitutional:       Appearance: Normal appearance. She is normal weight.   HENT:      Head: Normocephalic.      Nose: Nose normal.      Mouth/Throat:      Mouth: Mucous membranes are moist.   Eyes:      Extraocular Movements: Extraocular movements intact.      Conjunctiva/sclera: Conjunctivae normal.      Pupils: Pupils are equal, round, and reactive to light.   Cardiovascular:      Rate and Rhythm: Normal rate and regular rhythm.      Pulses: Normal pulses.      Heart sounds: Normal heart sounds.   Pulmonary:      Effort: Pulmonary effort is normal.      Breath sounds: Normal breath sounds.   Abdominal:      General: Abdomen is flat. Bowel sounds are normal. There is distension.      Palpations: Abdomen is soft.      Tenderness: There is abdominal tenderness.   Musculoskeletal:         General: Normal range of motion.      Cervical back: Normal range of motion and neck supple.   Skin:     General: Skin is warm and dry.      Capillary Refill: Capillary refill takes less than 2 seconds.   Neurological:      General: No focal deficit present.      Mental Status: She is alert and oriented to person, place, and time.   Psychiatric:         Behavior: Behavior normal.         Thought Content: Thought content normal.        Assessment and Plan     1. Constipation, unspecified constipation type  -     X-Ray Abdomen AP 1 View; Future; Expected date: 01/09/2025  -     lactulose (CHRONULAC) 20 gram/30 mL Soln; Take 30 mLs (20 g total) by mouth daily as needed. Until bowel movement is produced  Dispense: 120 mL; Refill:  0        No stool or impaction on JAVIER  Repeat KUB  Increase water intake  Continue stool softeners and miralax  Add chronulac, take daily until BM is produced         No follow-ups on file.

## 2025-01-16 ENCOUNTER — OFFICE VISIT (OUTPATIENT)
Dept: FAMILY MEDICINE | Facility: CLINIC | Age: 85
End: 2025-01-16
Payer: MEDICARE

## 2025-01-16 VITALS
OXYGEN SATURATION: 96 % | BODY MASS INDEX: 29.01 KG/M2 | DIASTOLIC BLOOD PRESSURE: 80 MMHG | RESPIRATION RATE: 16 BRPM | WEIGHT: 195.88 LBS | SYSTOLIC BLOOD PRESSURE: 140 MMHG | HEART RATE: 80 BPM | HEIGHT: 69 IN | TEMPERATURE: 98 F

## 2025-01-16 DIAGNOSIS — M54.6 ACUTE BILATERAL THORACIC BACK PAIN: Primary | ICD-10-CM

## 2025-01-16 DIAGNOSIS — R10.9 ABDOMINAL PAIN, UNSPECIFIED ABDOMINAL LOCATION: ICD-10-CM

## 2025-01-16 DIAGNOSIS — Z78.0 MENOPAUSE: Chronic | ICD-10-CM

## 2025-01-16 DIAGNOSIS — Z13.820 SCREENING FOR OSTEOPOROSIS: ICD-10-CM

## 2025-01-16 DIAGNOSIS — M54.50 ACUTE LOW BACK PAIN, UNSPECIFIED BACK PAIN LATERALITY, UNSPECIFIED WHETHER SCIATICA PRESENT: ICD-10-CM

## 2025-01-16 DIAGNOSIS — I10 PRIMARY HYPERTENSION: ICD-10-CM

## 2025-01-16 DIAGNOSIS — I48.19 PERSISTENT ATRIAL FIBRILLATION: ICD-10-CM

## 2025-01-16 DIAGNOSIS — K59.00 CONSTIPATION, UNSPECIFIED CONSTIPATION TYPE: ICD-10-CM

## 2025-01-16 DIAGNOSIS — R10.9 RIGHT FLANK PAIN: ICD-10-CM

## 2025-01-16 LAB
25(OH)D3 SERPL-MCNC: 37 NG/ML (ref 30–80)
ALBUMIN SERPL BCP-MCNC: 3.6 G/DL (ref 3.4–4.8)
ALBUMIN/GLOB SERPL: 0.9 {RATIO}
ALP SERPL-CCNC: 124 U/L (ref 40–150)
ALT SERPL W P-5'-P-CCNC: 18 U/L
ANION GAP SERPL CALCULATED.3IONS-SCNC: 14 MMOL/L (ref 7–16)
AST SERPL W P-5'-P-CCNC: 34 U/L (ref 5–34)
BACTERIA #/AREA URNS HPF: ABNORMAL /HPF
BASOPHILS # BLD AUTO: 0.08 K/UL (ref 0–0.2)
BASOPHILS NFR BLD AUTO: 0.9 % (ref 0–1)
BILIRUB SERPL-MCNC: 0.4 MG/DL
BILIRUB UR QL STRIP: NEGATIVE
BUN SERPL-MCNC: 17 MG/DL (ref 10–20)
BUN/CREAT SERPL: 21 (ref 6–20)
CALCIUM SERPL-MCNC: 9.4 MG/DL (ref 8.4–10.2)
CHLORIDE SERPL-SCNC: 103 MMOL/L (ref 98–107)
CLARITY UR: CLEAR
CO2 SERPL-SCNC: 27 MMOL/L (ref 23–31)
COLOR UR: YELLOW
CREAT SERPL-MCNC: 0.81 MG/DL (ref 0.55–1.02)
DIFFERENTIAL METHOD BLD: ABNORMAL
EGFR (NO RACE VARIABLE) (RUSH/TITUS): 72 ML/MIN/1.73M2
EOSINOPHIL # BLD AUTO: 0.3 K/UL (ref 0–0.5)
EOSINOPHIL NFR BLD AUTO: 3.3 % (ref 1–4)
ERYTHROCYTE [DISTWIDTH] IN BLOOD BY AUTOMATED COUNT: 12.3 % (ref 11.5–14.5)
GLOBULIN SER-MCNC: 3.8 G/DL (ref 2–4)
GLUCOSE SERPL-MCNC: 95 MG/DL (ref 82–115)
GLUCOSE UR STRIP-MCNC: NORMAL MG/DL
HCT VFR BLD AUTO: 43 % (ref 38–47)
HGB BLD-MCNC: 13.5 G/DL (ref 12–16)
IMM GRANULOCYTES # BLD AUTO: 0.04 K/UL (ref 0–0.04)
IMM GRANULOCYTES NFR BLD: 0.4 % (ref 0–0.4)
KETONES UR STRIP-SCNC: ABNORMAL MG/DL
LEUKOCYTE ESTERASE UR QL STRIP: ABNORMAL
LYMPHOCYTES # BLD AUTO: 2.06 K/UL (ref 1–4.8)
LYMPHOCYTES NFR BLD AUTO: 22.7 % (ref 27–41)
MCH RBC QN AUTO: 29.2 PG (ref 27–31)
MCHC RBC AUTO-ENTMCNC: 31.4 G/DL (ref 32–36)
MCV RBC AUTO: 92.9 FL (ref 80–96)
MONOCYTES # BLD AUTO: 0.7 K/UL (ref 0–0.8)
MONOCYTES NFR BLD AUTO: 7.7 % (ref 2–6)
MPC BLD CALC-MCNC: 11.2 FL (ref 9.4–12.4)
MUCOUS, UA: ABNORMAL /LPF
NEUTROPHILS # BLD AUTO: 5.9 K/UL (ref 1.8–7.7)
NEUTROPHILS NFR BLD AUTO: 65 % (ref 53–65)
NITRITE UR QL STRIP: NEGATIVE
NRBC # BLD AUTO: 0 X10E3/UL
NRBC, AUTO (.00): 0 %
PH UR STRIP: 6 PH UNITS
PLATELET # BLD AUTO: 332 K/UL (ref 150–400)
POTASSIUM SERPL-SCNC: 3.9 MMOL/L (ref 3.5–5.1)
PROT SERPL-MCNC: 7.4 G/DL (ref 5.8–7.6)
PROT UR QL STRIP: 10
RBC # BLD AUTO: 4.63 M/UL (ref 4.2–5.4)
RBC # UR STRIP: NEGATIVE /UL
RBC #/AREA URNS HPF: 6 /HPF
SODIUM SERPL-SCNC: 140 MMOL/L (ref 136–145)
SP GR UR STRIP: 1.02
SQUAMOUS #/AREA URNS LPF: ABNORMAL /HPF
TSH SERPL DL<=0.005 MIU/L-ACNC: 2.67 UIU/ML (ref 0.35–4.94)
UROBILINOGEN UR STRIP-ACNC: 4 MG/DL
WBC # BLD AUTO: 9.08 K/UL (ref 4.5–11)
WBC #/AREA URNS HPF: 20 /HPF

## 2025-01-16 PROCEDURE — 81001 URINALYSIS AUTO W/SCOPE: CPT | Mod: ,,, | Performed by: CLINICAL MEDICAL LABORATORY

## 2025-01-16 PROCEDURE — 87086 URINE CULTURE/COLONY COUNT: CPT | Mod: ,,, | Performed by: CLINICAL MEDICAL LABORATORY

## 2025-01-16 PROCEDURE — 85025 COMPLETE CBC W/AUTO DIFF WBC: CPT | Mod: ,,, | Performed by: CLINICAL MEDICAL LABORATORY

## 2025-01-16 PROCEDURE — 80053 COMPREHEN METABOLIC PANEL: CPT | Mod: ,,, | Performed by: CLINICAL MEDICAL LABORATORY

## 2025-01-16 PROCEDURE — 36415 COLL VENOUS BLD VENIPUNCTURE: CPT | Mod: ,,, | Performed by: FAMILY MEDICINE

## 2025-01-16 PROCEDURE — 87077 CULTURE AEROBIC IDENTIFY: CPT | Mod: ,,, | Performed by: CLINICAL MEDICAL LABORATORY

## 2025-01-16 PROCEDURE — 99214 OFFICE O/P EST MOD 30 MIN: CPT | Mod: ,,, | Performed by: FAMILY MEDICINE

## 2025-01-16 PROCEDURE — 82306 VITAMIN D 25 HYDROXY: CPT | Mod: GZ,,, | Performed by: CLINICAL MEDICAL LABORATORY

## 2025-01-16 PROCEDURE — 84443 ASSAY THYROID STIM HORMONE: CPT | Mod: ,,, | Performed by: CLINICAL MEDICAL LABORATORY

## 2025-01-16 RX ORDER — HYDROCODONE BITARTRATE AND ACETAMINOPHEN 7.5; 325 MG/1; MG/1
1 TABLET ORAL EVERY 4 HOURS PRN
Qty: 42 TABLET | Refills: 0 | Status: SHIPPED | OUTPATIENT
Start: 2025-01-16

## 2025-01-16 NOTE — PROGRESS NOTES
"Subjective     Patient ID: Tonya Sandoval is a 84 y.o. female.    Chief Complaint: Back Pain (C/O mid backpain,started right after Lily.) and Fatigue    History of Present Illness    CHIEF COMPLAINT:  Patient presents today for back pain with stooped posture.    HISTORY OF PRESENT ILLNESS:  She reports back pain that began between Christmas and New Year's, approximately three weeks ago. The pain radiates throughout her back and settles in the right kidney area. She attributes the onset to overexertion during holiday activities, including lifting boxes and handling [employer] decorations. She has lost 5 lbs since last Friday due to decreased appetite.    GI CONCERNS:  She experienced severe constipation requiring multiple interventions including an unsuccessful enema on New Year's Day. The constipation resolved with Dulcolax, stool softeners, and prune juice with Miralax the following Friday. She denies history of chronic constipation.    DIET:  She currently eats twice daily, consisting of breakfast and a meal between 1:00 PM and 3:00 PM.    MEDICAL HISTORY:  She has a history of kidney stones requiring surgical intervention in both kidneys in the late 1960s and early 1970s. She has persistent atrial fibrillation with annual follow-up. In July of last year, she sustained rib fractures from a fall while picking figs after stumbling over a ladder.    MEDICATIONS:  She takes Centrum Silver 50 plus multivitamin nightly.      ROS:  Constitutional: +weight loss  Gastrointestinal: +constipation  Musculoskeletal: +back pain, +muscle pain              Objective   Blood pressure (!) 140/80, pulse 80, temperature 97.8 °F (36.6 °C), temperature source Oral, resp. rate 16, height 5' 9" (1.753 m), weight 88.9 kg (195 lb 14.4 oz), SpO2 96%.    Physical Exam  Constitutional:       Appearance: Normal appearance. She is obese.   HENT:      Head: Normocephalic and atraumatic.      Right Ear: External ear normal.      Left " Ear: External ear normal.      Nose: Nose normal.      Mouth/Throat:      Mouth: Mucous membranes are moist.   Eyes:      Conjunctiva/sclera: Conjunctivae normal.      Pupils: Pupils are equal, round, and reactive to light.   Cardiovascular:      Rate and Rhythm: Normal rate and regular rhythm.      Pulses: Normal pulses.      Heart sounds: Normal heart sounds.   Pulmonary:      Effort: Pulmonary effort is normal.      Breath sounds: Normal breath sounds.   Abdominal:      General: Abdomen is flat.      Palpations: Abdomen is soft.      Tenderness: There is no right CVA tenderness, left CVA tenderness, guarding or rebound.   Musculoskeletal:         General: Tenderness (tenderness in mid back and across at bilateral flank areas) and deformity (some dorsal kyphosis) present. Normal range of motion.      Cervical back: Normal range of motion and neck supple.   Skin:     General: Skin is warm and dry.   Neurological:      General: No focal deficit present.      Mental Status: She is alert and oriented to person, place, and time.   Psychiatric:         Mood and Affect: Mood normal.         Behavior: Behavior normal.         Thought Content: Thought content normal.         Judgment: Judgment normal.            Assessment and Plan   Assessment & Plan    IMPRESSION:  - Considering compression fracture in thoracic/lumbar spine due to symptoms, posture changes, and potential osteoporosis  - Evaluating for kidney stones, though less likely due to bilateral pain  - Assessing for constipation as a contributing factor  - Planning to rule out other abdominal pathologies with imaging and colonoscopy  - Considering osteoporosis treatment pending DEXA scan results    BACK PAIN:  - Evaluated the patient's back pain that started approximately 3 weeks ago, between Christmas and New Year's.  - Noted that the pain is diffuse, affecting both sides and the middle section of the back, with occasional localization to the right kidney  area.  - Observed that the patient has become slumped over in the past 2 weeks and reports difficulty moving due to pain.  - Ordered thoracic and lumbar spine x-rays to assess for compression fractures or other abnormalities.  - Prescribed hydrocodone 7.5 mg to be taken every 4 hours as needed for pain management.  - Recommend OTC pain medications: acetaminophen (up to 6 tablets in 24 hours) and naproxen (up to 2 tablets twice daily).  - Instructed the patient to follow up after imaging studies are completed to review results and determine next steps.  - Advised the patient to contact the office if pain worsens or new symptoms develop.  - Noted the patient's recent postural changes, becoming more slumped over in recent weeks.  - Considered scoliosis as a possible contributing factor to the patient's back pain in the affected area.    SUSPECTED COMPRESSION FRACTURE:  - Considered the possibility of a compression fracture due to osteoporosis as a cause for the pain and postural changes.  - Suspected a possible compression fracture due to osteoporosis.  - Referred the patient to a pain clinic for potential kyphoplasty procedure if a significant compression fracture is found.  - Provided information on kyphoplasty procedure for vertebral compression fractures.    OSTEOPOROSIS:  - Discussed osteoporosis and its potential complications, including increased fracture risk.  - Ordered a DEXA scan to assess bone density.  - Discussed potential treatment with denosumab (Prolia) for osteoporosis if confirmed by tests.    KIDNEY CONCERNS:  - Noted the patient's history of kidney stones and surgeries on both kidneys in the late 1960s and early 1970s.  - Ordered urinalysis with reflex culture to check for blood or infection.  - Considered ultrasound or CT to evaluate kidneys if necessary.    RESOLVED CONSTIPATION:  - Noted that the patient experienced severe constipation starting around New Year's Day, lasting for about a week.  -  Reviewed recent abdominal x-ray (KUB) which showed no signs of constipation.  - Confirmed that constipation has resolved.  - Advised the patient to continue using prune juice for bowel regularity.  - Noted that the patient used various treatments including enemas, stool softeners, bisacodyl, prune juice with polyethylene glycol, and magnesium hydroxide to resolve constipation.    COLONOSCOPY:  - Scheduled the patient for a colonoscopy with a gastroenterologist on March 5th.    HISTORY OF RIB FRACTURES:  - Noted the patient's history of rib fractures from a fall in July of the previous year.    ATRIAL FIBRILLATION:  - Confirmed that the patient has persistent atrial fibrillation and sees Dr. Bhargavi Durán annually for this condition.  - Noted that the patient reports no symptoms   related to atrial fibrillation.    MEDICATIONS/SUPPLEMENTS:  - Continued Centrum Silver 50+ multivitamin daily.    OTHER INSTRUCTIONS:  - Patient to increase water intake.         1. Acute bilateral thoracic back pain  -     CBC Auto Differential  -     Comprehensive Metabolic Panel  -     TSH  -     Vitamin D  -     Urinalysis, Reflex to Urine Culture  -     X-Ray Thoracic Spine AP Lateral; Future; Expected date: 01/16/2025  -     X-Ray Lumbar Spine 5 View; Future; Expected date: 01/16/2025  -     HYDROcodone-acetaminophen (NORCO) 7.5-325 mg per tablet; Take 1 tablet by mouth every 4 (four) hours as needed for Pain.  Dispense: 42 tablet; Refill: 0    2. Primary hypertension  -     CBC Auto Differential  -     Comprehensive Metabolic Panel  -     TSH  -     Vitamin D  -     Urinalysis, Reflex to Urine Culture    3. Persistent atrial fibrillation  Overview:  Patient had gyno bleeding while on Xarelto and has declined to try other anticoagulants.     Orders:  -     CBC Auto Differential  -     Comprehensive Metabolic Panel  -     TSH  -     Vitamin D  -     Urinalysis, Reflex to Urine Culture    4. Constipation, unspecified constipation  type  -     CBC Auto Differential  -     Comprehensive Metabolic Panel  -     TSH  -     Vitamin D  -     Urinalysis, Reflex to Urine Culture    5. Right flank pain  -     CBC Auto Differential  -     Comprehensive Metabolic Panel  -     TSH  -     Vitamin D  -     Urinalysis, Reflex to Urine Culture  -     X-Ray Thoracic Spine AP Lateral; Future; Expected date: 01/16/2025  -     X-Ray Lumbar Spine 5 View; Future; Expected date: 01/16/2025  -     HYDROcodone-acetaminophen (NORCO) 7.5-325 mg per tablet; Take 1 tablet by mouth every 4 (four) hours as needed for Pain.  Dispense: 42 tablet; Refill: 0    6. Acute low back pain, unspecified back pain laterality, unspecified whether sciatica present  -     X-Ray Thoracic Spine AP Lateral; Future; Expected date: 01/16/2025  -     X-Ray Lumbar Spine 5 View; Future; Expected date: 01/16/2025  -     HYDROcodone-acetaminophen (NORCO) 7.5-325 mg per tablet; Take 1 tablet by mouth every 4 (four) hours as needed for Pain.  Dispense: 42 tablet; Refill: 0    7. Abdominal pain, unspecified abdominal location  -     CT Renal Stone Study ABD Pelvis WO; Future; Expected date: 01/16/2025    8. Menopause  -     DXA Bone Density Axial Skeleton 1 or more sites; Future; Expected date: 01/16/2025    9. Screening for osteoporosis  -     DXA Bone Density Axial Skeleton 1 or more sites; Future; Expected date: 01/16/2025        If need pain treatment, will see .          Follow up if symptoms worsen or fail to improve.

## 2025-01-17 ENCOUNTER — HOSPITAL ENCOUNTER (OUTPATIENT)
Dept: RADIOLOGY | Facility: HOSPITAL | Age: 85
Discharge: HOME | End: 2025-01-17
Attending: FAMILY MEDICINE
Payer: MEDICARE

## 2025-01-17 DIAGNOSIS — N39.0 URINARY TRACT INFECTION WITHOUT HEMATURIA, SITE UNSPECIFIED: Primary | ICD-10-CM

## 2025-01-17 DIAGNOSIS — M54.6 ACUTE BILATERAL THORACIC BACK PAIN: ICD-10-CM

## 2025-01-17 DIAGNOSIS — R10.9 RIGHT FLANK PAIN: ICD-10-CM

## 2025-01-17 DIAGNOSIS — M54.50 ACUTE LOW BACK PAIN, UNSPECIFIED BACK PAIN LATERALITY, UNSPECIFIED WHETHER SCIATICA PRESENT: ICD-10-CM

## 2025-01-17 PROCEDURE — 72110 X-RAY EXAM L-2 SPINE 4/>VWS: CPT | Mod: TC

## 2025-01-17 PROCEDURE — 72070 X-RAY EXAM THORAC SPINE 2VWS: CPT | Mod: TC

## 2025-01-17 RX ORDER — NITROFURANTOIN 25; 75 MG/1; MG/1
100 CAPSULE ORAL 2 TIMES DAILY
Qty: 20 CAPSULE | Refills: 0 | Status: SHIPPED | OUTPATIENT
Start: 2025-01-17 | End: 2025-01-27

## 2025-01-18 LAB — UA COMPLETE W REFLEX CULTURE PNL UR: ABNORMAL

## 2025-01-22 DIAGNOSIS — S22.080A COMPRESSION FRACTURE OF T12 VERTEBRA, INITIAL ENCOUNTER: Primary | ICD-10-CM

## 2025-01-30 ENCOUNTER — OFFICE VISIT (OUTPATIENT)
Dept: FAMILY MEDICINE | Facility: CLINIC | Age: 85
End: 2025-01-30
Payer: MEDICARE

## 2025-01-30 VITALS
SYSTOLIC BLOOD PRESSURE: 130 MMHG | RESPIRATION RATE: 18 BRPM | HEIGHT: 69 IN | HEART RATE: 82 BPM | DIASTOLIC BLOOD PRESSURE: 80 MMHG | BODY MASS INDEX: 29.03 KG/M2 | WEIGHT: 196 LBS | TEMPERATURE: 97 F | OXYGEN SATURATION: 97 %

## 2025-01-30 DIAGNOSIS — N39.0 URINARY TRACT INFECTION WITHOUT HEMATURIA, SITE UNSPECIFIED: Primary | ICD-10-CM

## 2025-01-30 DIAGNOSIS — H93.8X1 CONGESTION OF RIGHT EAR: ICD-10-CM

## 2025-01-30 LAB
BILIRUB UR QL STRIP: NEGATIVE
CLARITY UR: CLEAR
COLOR UR: ABNORMAL
GLUCOSE UR STRIP-MCNC: NORMAL MG/DL
KETONES UR STRIP-SCNC: NEGATIVE MG/DL
LEUKOCYTE ESTERASE UR QL STRIP: ABNORMAL
MUCOUS, UA: ABNORMAL /LPF
NITRITE UR QL STRIP: NEGATIVE
PH UR STRIP: 7 PH UNITS
PROT UR QL STRIP: NEGATIVE
RBC # UR STRIP: NEGATIVE /UL
RBC #/AREA URNS HPF: <1 /HPF
SP GR UR STRIP: 1.01
SQUAMOUS #/AREA URNS LPF: ABNORMAL /HPF
UROBILINOGEN UR STRIP-ACNC: NORMAL MG/DL
WBC #/AREA URNS HPF: 4 /HPF

## 2025-01-30 PROCEDURE — 81001 URINALYSIS AUTO W/SCOPE: CPT | Mod: ,,, | Performed by: CLINICAL MEDICAL LABORATORY

## 2025-01-30 PROCEDURE — 96372 THER/PROPH/DIAG INJ SC/IM: CPT | Mod: ,,, | Performed by: FAMILY MEDICINE

## 2025-01-30 PROCEDURE — 99213 OFFICE O/P EST LOW 20 MIN: CPT | Mod: 25,,, | Performed by: FAMILY MEDICINE

## 2025-01-30 RX ORDER — DEXAMETHASONE SODIUM PHOSPHATE 4 MG/ML
4 INJECTION, SOLUTION INTRA-ARTICULAR; INTRALESIONAL; INTRAMUSCULAR; INTRAVENOUS; SOFT TISSUE ONCE
Status: COMPLETED | OUTPATIENT
Start: 2025-01-30 | End: 2025-01-30

## 2025-01-30 RX ADMIN — DEXAMETHASONE SODIUM PHOSPHATE 4 MG: 4 INJECTION, SOLUTION INTRA-ARTICULAR; INTRALESIONAL; INTRAMUSCULAR; INTRAVENOUS; SOFT TISSUE at 02:01

## 2025-01-30 NOTE — PROGRESS NOTES
"Subjective     Patient ID: Tonya Sandoval is a 84 y.o. female.    Chief Complaint: Follow-up (Followup UTI ) and Nasal Congestion (Nasal congestion, ears feel full )    History of Present Illness    CHIEF COMPLAINT:  Patient presents today for follow up of back pain    BACK PAIN:  She reports significant improvement in back pain with less discomfort during movement and ambulation. She has a 40% compression fracture in her vertebra.    ENT:  She reports noise and fluttering sensation in her right ear with popping sensation most noticeable upon waking and worse when lying down at night. She denies hearing loss in the affected ear. She experienced dizziness with medication which resolved with Dramamine. She has been using Flonase for mild congestion and denies rhinorrhea or significant cold symptoms.    GENITOURINARY:  She reports previous urinary area pain that has resolved. She denies current urinary symptoms, burning, or other bladder-related discomfort.      ROS:  Constitutional: +dizziness  ENT: -hearing loss, +nasal congestion  Genitourinary: -painful urination  Musculoskeletal: +back pain              Objective   Blood pressure 130/80, pulse 82, temperature 97.3 °F (36.3 °C), temperature source Temporal, resp. rate 18, height 5' 9" (1.753 m), weight 88.9 kg (195 lb 15.8 oz), SpO2 97%.    Physical Exam  Constitutional:       Appearance: Normal appearance.   HENT:      Head: Normocephalic and atraumatic.      Right Ear: External ear normal.      Left Ear: External ear normal.      Nose: Nose normal.      Mouth/Throat:      Mouth: Mucous membranes are moist.   Eyes:      Conjunctiva/sclera: Conjunctivae normal.      Pupils: Pupils are equal, round, and reactive to light.   Cardiovascular:      Rate and Rhythm: Normal rate and regular rhythm.      Pulses: Normal pulses.      Heart sounds: Normal heart sounds.   Pulmonary:      Effort: Pulmonary effort is normal.      Breath sounds: Normal breath sounds. "   Abdominal:      General: Abdomen is flat.      Palpations: Abdomen is soft.   Musculoskeletal:         General: Normal range of motion.      Cervical back: Normal range of motion and neck supple.   Skin:     General: Skin is warm and dry.   Neurological:      General: No focal deficit present.      Mental Status: She is alert and oriented to person, place, and time.   Psychiatric:         Mood and Affect: Mood normal.         Behavior: Behavior normal.         Thought Content: Thought content normal.         Judgment: Judgment normal.            Assessment and Plan   Assessment & Plan    IMPRESSION:  - Evaluated back pain and imaging results, noting 40% anterior vertebral compression.  - Considered kyphoplasty pending further imaging results.  - Assessed ear discomfort, noting dry ear canal with no visible blockage.  - Attributed ear symptoms to possible eustachian tube dysfunction or mild hearing loss.  - Will treat ear symptoms with steroid injection (Decadron) to address possible inflammation.    VERTEBRAL COMPRESSION FRACTURE:  - Noted a 40% compression fracture in the anterior aspect of the vertebra.  - Observed fracture healing after 6-8 weeks, but significant compression remains.  - Detected noticeable postural change, with patient leaning forward.  - Acknowledged need for further tests to confirm new fracture.  - Scheduled multiple imaging tests, including bone scans, density scan, and CT.  - Ordered CT and bone scan at [facility].  - Pain clinic plans to perform kyphoplasty to treat the vertebral fracture.  - Scheduled follow-up visit on February 14th at 12 PM for potential kyphoplasty procedure.  - Multiple procedures scheduled for February 14th, including imaging tests and potential kyphoplasty.  - Patient reports easing of pain, especially when initiating movement.  - Patient reports significant improvement in back pain.  - Noted decrease in pain as fracture heals.  - Acknowledged need for further  evaluation to determine appropriate treatment.  - Pain clinic plans interventions, including injections and possibly a kyphoplasty procedure.    TINNITUS AND OTIC SYMPTOMS:  - Patient reports noise in the right ear, feeling of aural fullness, and popping sensations.  - Patient notes tinnitus exacerbation at night when supine.  - Examined ear and found very dry canal with no obstruction.  - Noted no apparent hearing loss.  - Educated patient that tinnitus and abnormal auditory sensations are often associated with some degree of hearing loss.  - Explained that eustachian tube dysfunction can cause otic discomfort and popping sensations.  - Informed patient that weather changes can affect ear pressure and symptoms.  - Explained possible etiologies including hearing loss, eustachian tube issues, and weather-related factors.  - Administered Decadron injection for otic symptoms.  - Recommend steroid injection (Decadron) to address tinnitus.  - Instructed patient to contact the office if otic symptoms persist for potential otolaryngology referral.  - Suggested potential referral to an otolaryngologist if symptoms persist.    DIZZINESS:  - Patient reports experiencing dizziness while taking medication.  - Acknowledged that weather changes can contribute to dizziness.  - Patient self-treated dizziness with Dramamine.  - Approved of Dramamine use for vertigo symptoms.    FOLLOW UP:  - Scheduled follow-up visit on February 14th at 9:30 AM for pain clinic injection.         1. Urinary tract infection without hematuria, site unspecified  -     Urinalysis, Reflex to Urine Culture    2. Congestion of right ear  -     dexAMETHasone injection 4 mg                 Follow up if symptoms worsen or fail to improve.

## 2025-02-13 ENCOUNTER — OFFICE VISIT (OUTPATIENT)
Dept: FAMILY MEDICINE | Facility: CLINIC | Age: 85
End: 2025-02-13
Payer: MEDICARE

## 2025-02-13 VITALS
TEMPERATURE: 99 F | RESPIRATION RATE: 20 BRPM | SYSTOLIC BLOOD PRESSURE: 134 MMHG | HEART RATE: 81 BPM | BODY MASS INDEX: 28.88 KG/M2 | OXYGEN SATURATION: 95 % | HEIGHT: 69 IN | DIASTOLIC BLOOD PRESSURE: 82 MMHG | WEIGHT: 195 LBS

## 2025-02-13 DIAGNOSIS — J22 LOWER RESPIRATORY INFECTION: Primary | ICD-10-CM

## 2025-02-13 DIAGNOSIS — R05.9 COUGH, UNSPECIFIED TYPE: ICD-10-CM

## 2025-02-13 RX ORDER — METHYLPREDNISOLONE 4 MG/1
TABLET ORAL
Qty: 21 TABLET | Refills: 0 | Status: SHIPPED | OUTPATIENT
Start: 2025-02-13 | End: 2025-02-19

## 2025-02-13 RX ORDER — ALBUTEROL SULFATE 0.83 MG/ML
2.5 SOLUTION RESPIRATORY (INHALATION) EVERY 6 HOURS PRN
Qty: 60 ML | Refills: 0 | Status: SHIPPED | OUTPATIENT
Start: 2025-02-13

## 2025-02-17 NOTE — PROGRESS NOTES
Shaista Kaplan NP   [address] [place] [address]     PATIENT NAME: Tonya Sandoval  : 1940  DATE: 25  MRN: [id number]      Billing Provider: Shaista Kaplan NP  Level of Service:   Patient PCP Information       Provider PCP Type    Noe Haile MD General            Reason for Visit / Chief Complaint: Cough, Nasal Congestion, and Constipation       Update PCP  Update Chief Complaint         History of Present Illness / Problem Focused Workflow     Tonya Sandoval presents to the clinic with Cough, Nasal Congestion, and Constipation     History of Present Illness    HPI:  Patient reports cold symptoms since yesterday, including nasal congestion, cough, shortness of breath, and difficulty breathing. She has had back pain, constipation, and urinary symptoms since the beginning of the year. She reports having a fever. Her last bowel movement was on Monday of the previous week, with no movements since then. She attributes her constipation to starting a new back medication, Norco, which she was informed could cause constipation. She has been using Miralax, adding it to her oatmeal, cereal, and coffee in the mornings to address the constipation, but reports it has not been effective.    She mentions feeling fatigued. She is scheduled for multiple tests tomorrow, including a CT of her whole body to examine a back fracture, a bone density test, and imaging of her kidneys. She has an appointment with Dr. Mueller at 8:30 AM, where she is supposed to receive an injection, followed by the imaging tests.    She denies throat pain, body aches (except for back pain), and diarrhea. Her flu and COVID tests were negative.    MEDICATIONS:  Patient is on Norco for back pain, which is causing constipation. She is also taking Xanax and Flonase. For constipation management, she adds Miralax to her oatmeal, cereal, and coffee in the mornings. A Glycolax prescription was given for constipation, but the  patient reports it is not effective.    MEDICAL HISTORY:  Patient has a history of constipation, which started after beginning back pain medication (Norco).    TEST RESULTS:  Patient underwent a flu test today, which came back negative. A COVID test was also performed today, initially yielding a negative result after a rerun.      ROS:  General: +fever, -chills, +fatigue, -weight gain, -weight loss  Eyes: -vision changes, -redness, -discharge  ENT: -ear pain, +nasal congestion, -sore throat  Cardiovascular: -chest pain, -palpitations, -lower extremity edema  Respiratory: +cough, +shortness of breath  Gastrointestinal: -abdominal pain, -nausea, -vomiting, -diarrhea, +constipation, -blood in stool  Genitourinary: -dysuria, -hematuria, -frequency  Musculoskeletal: -joint pain, -muscle pain, +back pain  Skin: -rash, -lesion  Neurological: -headache, -dizziness, -numbness, -tingling  Psychiatric: -anxiety, -depression, -sleep difficulty                Medical / Social / Family History     Past Medical History:   Diagnosis Date    Atrial fibrillation     GERD (gastroesophageal reflux disease)     Hyperlipidemia     Hypertension     OA (osteoarthritis)        Past Surgical History:   Procedure Laterality Date    CARDIAC CATHETERIZATION      HYSTERECTOMY         Social History  Ms.  reports that she has never smoked. She has never been exposed to tobacco smoke. She has never used smokeless tobacco. She reports that she does not drink alcohol and does not use drugs.    Family History  Ms.'s family history includes Brain cancer in her father; Cancer in her sister; Heart disease in her brother.    Medications and Allergies     Medications  Outpatient Medications Marked as Taking for the 2/13/25 encounter (Office Visit) with Shaista Kaplan NP   Medication Sig Dispense Refill    ALPRAZolam (XANAX) 0.25 MG tablet Take 1 tablet (0.25 mg total) by mouth 3 (three) times daily. 30 tablet 5    aspirin 325 MG tablet Take 81 mg by  "mouth once daily.      atorvastatin (LIPITOR) 20 MG tablet TAKE 1 TABLET DAILY 90 tablet 3    HYDROcodone-acetaminophen (NORCO) 7.5-325 mg per tablet Take 1 tablet by mouth every 4 (four) hours as needed for Pain. 42 tablet 0    ibuprofen (ADVIL,MOTRIN) 800 MG tablet Take 1 tablet (800 mg total) by mouth 3 (three) times daily. 20 tablet 0    lansoprazole (PREVACID) 30 MG capsule TAKE 1 CAPSULE DAILY 90 capsule 3    metoprolol succinate (TOPROL-XL) 25 MG 24 hr tablet TAKE 1 TABLET DAILY 90 tablet 3    multivit-min/folic acid/lutein (CENTRUM SILVER ORAL) Take 1 tablet by mouth Daily.      olmesartan-hydrochlorothiazide (BENICAR HCT) 40-25 mg per tablet TAKE 1 TABLET DAILY 90 tablet 3    polyethylene glycol (GLYCOLAX) 17 gram/dose powder Take 17 g by mouth once daily. 7 each 0       Allergies  Review of patient's allergies indicates:   Allergen Reactions    Codeine     Nickel sutures [surgical stainless steel]        Physical Examination   /82 (BP Location: Left arm, Patient Position: Sitting)   Pulse 81   Temp 98.5 °F (36.9 °C) (Oral)   Resp 20   Ht 5' 9" (1.753 m)   Wt 88.5 kg (195 lb)   LMP  (LMP Unknown)   SpO2 95%   BMI 28.80 kg/m²    Physical Exam    General: No acute distress. Well-developed. Well-nourished.  Eyes: EOMI. Sclerae anicteric.  HENT: Normocephalic. Atraumatic. Nares patent. Moist oral mucosa. Pharyngeal erythema. Posterior oropharyngeal erythema.  Cardiovascular: Regular rate. Regular rhythm. No murmurs. No rubs. No gallops. Normal S1, S2.  Respiratory: Normal respiratory effort. Clear to auscultation bilaterally. No rales. No rhonchi. Wheezing present.  Musculoskeletal: No  obvious deformity. Fluid on joint.  Extremities: No lower extremity edema.  Neurological: Alert & oriented x3. No slurred speech. Normal gait.  Psychiatric: Normal mood. Normal affect. Good insight. Good judgment.  Skin: Warm. Dry. No rash.           Assessment and Plan (including Health Maintenance)      Problem " List  Smart Sets  Document Outside HM   :    Assessment & Plan    IMPRESSION:  - Ruled out COVID-19 and influenza based on negative test results  - Suspect viral bronchitis given sudden onset of symptoms and clinical presentation  - Considered pneumonia as differential diagnosis; will reassess based on pending imaging results  - Opted for oral steroid over injection to avoid potential interference with patient's scheduled procedures  - Deferred additional imaging to avoid duplication with patient's scheduled tests  - Constipation likely secondary to Norco use; will reassess after reviewing upcoming abdominal imaging    VIRAL INFECTION:  - Assessed the patient's condition, noting sudden onset of symptoms suggesting viral etiology.  - Observed wheezing in the patient's chest and a red throat during exam.  - Prescribed oral steroid to help open airways and ease breathing.  - Recommend Mucinex for daytime use to thin secretions.  - Prescribed nebulizer medication for use with home nebulizer machine (if obtained).  - Considered ordering a chest XR to rule out pneumonia.  - Instructed the patient to contact the office if symptoms worsen or new concerns arise.    CONSTIPATION:  - Discussed the constipating effects of Norco with the patient.  - Reviewed the importance of adequate fluid intake for effective laxative use.  - Advised the patient to continue using Miralax in morning beverages.  - Considered imaging of stomach to check for impaction.  - Noted that the patient's last bowel movement was 1 week ago and that the patient has not been eating much.  - Emphasized the importance of drinking plenty of water with laxatives.  - Noted that the patient reports constipation since starting Norco for back pain.  - Patient to increase fluid intake, especially when using laxatives.    BACK PAIN:  - Evaluated the patient's back pain and observed fluid on the patient's back.  - Scheduled imaging tests including CT and bone density  test.  - Noted that the patient is taking Norco for back pain.  - Scheduled the patient for an injection with Dr. Mueller, possibly related to back pain.    EAR CONGESTION:  - Continued Flonase nasal spray.  - Recommend continuing antihistamine (Zyrtec or Claritin) in combination with Flonase for ear congestion.  - Noted that the patient reports ear popping.    FOLLOW UP:  - Follow up after imaging results are reviewed to determine if additional treatment is necessary.              Health Maintenance Due   Topic Date Due    DEXA Scan  Never done    Shingles Vaccine (1 of 2) Never done    RSV Vaccine (Age 60+ and Pregnant patients) (1 - 1-dose 75+ series) Never done    Influenza Vaccine (1) 09/01/2024       Problem List Items Addressed This Visit    None  Visit Diagnoses         Lower respiratory infection    -  Primary    Relevant Medications    methylPREDNISolone (MEDROL DOSEPACK) 4 mg tablet    albuterol (PROVENTIL) 2.5 mg /3 mL (0.083 %) nebulizer solution      Cough, unspecified type        Relevant Orders    POCT Influenza A/B Molecular    POCT COVID-19 Rapid Screening            Health Maintenance Topics with due status: Not Due       Topic Last Completion Date    TETANUS VACCINE 02/17/2016    Lipid Panel 09/16/2024       Future Appointments   Date Time Provider Department Center   3/5/2025  1:00 PM Jaren Kingsley MD [facility]STR [facility]   3/7/2025 10:30 AM [facility] CT1 [facility] CTIC [facility]   3/7/2025 11:00 AM [facility] DEXA1 [facility] BDIC [facility]   7/17/2025  1:00 PM Poly Rivera MD [facility]   9/23/2025 10:00 AM Reyna Durán ACNP [facility] CARD [facility]            Signature:  Shaista Kaplan, GIANLUCA      [id number]   [place] [address]    Date of encounter: 2/13/25

## 2025-02-19 ENCOUNTER — RESULTS FOLLOW-UP (OUTPATIENT)
Dept: FAMILY MEDICINE | Facility: CLINIC | Age: 85
End: 2025-02-19

## 2025-02-19 ENCOUNTER — HOSPITAL ENCOUNTER (OUTPATIENT)
Dept: RADIOLOGY | Facility: HOSPITAL | Age: 85
Discharge: HOME | End: 2025-02-19
Attending: FAMILY MEDICINE
Payer: MEDICARE

## 2025-02-19 ENCOUNTER — OFFICE VISIT (OUTPATIENT)
Dept: FAMILY MEDICINE | Facility: CLINIC | Age: 85
End: 2025-02-19
Payer: MEDICARE

## 2025-02-19 VITALS
WEIGHT: 186.63 LBS | HEIGHT: 69 IN | SYSTOLIC BLOOD PRESSURE: 130 MMHG | RESPIRATION RATE: 20 BRPM | OXYGEN SATURATION: 92 % | HEART RATE: 87 BPM | BODY MASS INDEX: 27.64 KG/M2 | TEMPERATURE: 98 F | DIASTOLIC BLOOD PRESSURE: 78 MMHG

## 2025-02-19 DIAGNOSIS — Z79.899 ENCOUNTER FOR LONG-TERM (CURRENT) USE OF HIGH-RISK MEDICATION: ICD-10-CM

## 2025-02-19 DIAGNOSIS — I10 PRIMARY HYPERTENSION: Chronic | ICD-10-CM

## 2025-02-19 DIAGNOSIS — J98.01 BRONCHOSPASM: ICD-10-CM

## 2025-02-19 DIAGNOSIS — J22 LOWER RESPIRATORY INFECTION: ICD-10-CM

## 2025-02-19 DIAGNOSIS — J22 LOWER RESPIRATORY INFECTION: Primary | ICD-10-CM

## 2025-02-19 DIAGNOSIS — R05.9 COUGH, UNSPECIFIED TYPE: ICD-10-CM

## 2025-02-19 DIAGNOSIS — F41.9 ANXIETY: Chronic | ICD-10-CM

## 2025-02-19 LAB
CTP QC/QA: YES
CTP QC/QA: YES
POC (AMP) AMPHETAMINE: NEGATIVE
POC (BAR) BARBITURATES: NEGATIVE
POC (BUP) BUPRENORPHINE: NEGATIVE
POC (BZO) BENZODIAZEPINES: ABNORMAL
POC (COC) COCAINE: NEGATIVE
POC (MDMA) METHYLENEDIOXYMETHAMPHETAMINE 3,4: NEGATIVE
POC (MET) METHAMPHETAMINE: NEGATIVE
POC (MOP) OPIATES: ABNORMAL
POC (MTD) METHADONE: NEGATIVE
POC (OXY) OXYCODONE: NEGATIVE
POC (PCP) PHENCYCLIDINE: NEGATIVE
POC (TCA) TRICYCLIC ANTIDEPRESSANTS: NEGATIVE
POC RSV RAPID ANT MOLECULAR: NEGATIVE
POC TEMPERATURE (URINE): 92
POC THC: NEGATIVE

## 2025-02-19 PROCEDURE — 71046 X-RAY EXAM CHEST 2 VIEWS: CPT | Mod: TC

## 2025-02-19 RX ORDER — ALPRAZOLAM 0.25 MG/1
0.25 TABLET ORAL 3 TIMES DAILY
Qty: 30 TABLET | Refills: 5 | Status: SHIPPED | OUTPATIENT
Start: 2025-02-19

## 2025-02-19 RX ORDER — AZITHROMYCIN 250 MG/1
TABLET, FILM COATED ORAL
Qty: 6 TABLET | Refills: 0 | Status: SHIPPED | OUTPATIENT
Start: 2025-02-19

## 2025-02-19 RX ORDER — METHYLPREDNISOLONE ACETATE 80 MG/ML
80 INJECTION, SUSPENSION INTRA-ARTICULAR; INTRALESIONAL; INTRAMUSCULAR; SOFT TISSUE ONCE
Status: DISCONTINUED | OUTPATIENT
Start: 2025-02-19 | End: 2025-02-19

## 2025-02-19 RX ORDER — METHYLPREDNISOLONE ACETATE 40 MG/ML
40 INJECTION, SUSPENSION INTRA-ARTICULAR; INTRALESIONAL; INTRAMUSCULAR; SOFT TISSUE ONCE
Status: COMPLETED | OUTPATIENT
Start: 2025-02-19 | End: 2025-02-19

## 2025-02-19 RX ORDER — ALBUTEROL SULFATE 90 UG/1
2 INHALANT RESPIRATORY (INHALATION) EVERY 4 HOURS PRN
Qty: 25.5 G | Refills: 11 | Status: SHIPPED | OUTPATIENT
Start: 2025-02-19 | End: 2026-02-19

## 2025-02-19 RX ORDER — CEFTRIAXONE 1 G/1
1 INJECTION, POWDER, FOR SOLUTION INTRAMUSCULAR; INTRAVENOUS
Status: COMPLETED | OUTPATIENT
Start: 2025-02-19 | End: 2025-02-19

## 2025-02-19 RX ADMIN — METHYLPREDNISOLONE ACETATE 40 MG: 40 INJECTION, SUSPENSION INTRA-ARTICULAR; INTRALESIONAL; INTRAMUSCULAR; SOFT TISSUE at 09:02

## 2025-02-19 RX ADMIN — CEFTRIAXONE 1 G: 1 INJECTION, POWDER, FOR SOLUTION INTRAMUSCULAR; INTRAVENOUS at 09:02

## 2025-02-19 NOTE — PROGRESS NOTES
Subjective     Patient ID: Tonya Sandoval is a 84 y.o. female.    Chief Complaint: Cough, Headache, Anxiety (C/O cough,headache and needs a refill on her xanax.), and Shortness of Breath (Also c/o SOB)    History of Present Illness    CHIEF COMPLAINT:  Patient presents today for acute respiratory illness with cough and hoarseness.    HISTORY OF PRESENT ILLNESS:  She reports illness onset last Thursday with severe hoarseness progressing to complete voice loss Friday and Saturday. She experiences nocturnal hot flashes, possibly febrile, though temperature has not been measured. She has occasional mucus production but denies productive cough with yellow or green sputum. She tested negative for flu and COVID. She uses nebulizer with albuterol every 4 hours for temporary symptom relief and completed a steroid pack today. She denies known exposure to individuals with flu, COVID, or RSV, but reports recent exposure to granddaughter with cold symptoms and great-grandbaby with a 24-hour stomach virus within the past two weeks.    MEDICAL HISTORY:  She has a history of back fracture. She recently experienced constipation that has improved with prescribed liquid medication. She has never had a colonoscopy.    MEDICATIONS:  She takes Xanax as needed for sleep.    ALLERGIES:  She has allergies to codeine and nickel. She denies allergies to antibiotics.      ROS:  Constitutional: +fevers  Respiratory: +cough, -sputum production  Gastrointestinal: +constipation  Allergic: -allergic reactions       Office Visit on 02/19/2025   Component Date Value Ref Range Status    POC RSV Rapid Ant Molecular 02/19/2025 Negative  Negative Final     Acceptable 02/19/2025 Yes   Final    POC Amphetamines 02/19/2025 Negative  Negative, Inconclusive Final    POC Barbiturates 02/19/2025 Negative  Negative, Inconclusive Final    POC Benzodiazepines 02/19/2025 Presumptive Positive (A)  Negative, Inconclusive Final    POC Cocaine  "02/19/2025 Negative  Negative, Inconclusive Final    POC THC 02/19/2025 Negative  Negative, Inconclusive Final    POC Methadone 02/19/2025 Negative  Negative, Inconclusive Final    POC Methamphetamine 02/19/2025 Negative  Negative, Inconclusive Final    POC Opiates 02/19/2025 Presumptive Positive (A)  Negative, Inconclusive Final    POC Oxycodone 02/19/2025 Negative  Negative, Inconclusive Final    POC Phencyclidine 02/19/2025 Negative  Negative, Inconclusive Final    POC Methylenedioxymethamphetamine * 02/19/2025 Negative  Negative, Inconclusive Final    POC Tricyclic Antidepressants 02/19/2025 Negative  Negative, Inconclusive Final    POC Buprenorphine 02/19/2025 Negative   Final     Acceptable 02/19/2025 Yes   Final    POC Temperature (Urine) 02/19/2025 92   Final            Objective   Blood pressure 130/78, pulse 87, temperature 98.1 °F (36.7 °C), temperature source Oral, resp. rate 20, height 5' 9" (1.753 m), weight 84.6 kg (186 lb 9.6 oz), SpO2 (!) 92%.    Physical Exam  Constitutional:       Appearance: Normal appearance. She is obese. She is ill-appearing.   HENT:      Head: Normocephalic and atraumatic.      Right Ear: External ear normal.      Left Ear: External ear normal.      Nose: Nose normal.      Mouth/Throat:      Mouth: Mucous membranes are moist.   Eyes:      Conjunctiva/sclera: Conjunctivae normal.      Pupils: Pupils are equal, round, and reactive to light.   Cardiovascular:      Rate and Rhythm: Normal rate and regular rhythm.      Pulses: Normal pulses.      Heart sounds: Normal heart sounds.   Pulmonary:      Effort: Pulmonary effort is normal.      Breath sounds: Wheezing and rhonchi present.      Comments: bilateral  Abdominal:      General: Abdomen is flat.      Palpations: Abdomen is soft.   Musculoskeletal:         General: Normal range of motion.      Cervical back: Normal range of motion and neck supple.   Skin:     General: Skin is warm and dry.   Neurological:      " General: No focal deficit present.      Mental Status: She is alert and oriented to person, place, and time.   Psychiatric:         Mood and Affect: Mood normal.         Behavior: Behavior normal.         Thought Content: Thought content normal.         Judgment: Judgment normal.            Assessment and Plan   Assessment & Plan    IMPRESSION:  - Suspected viral infection, possibly RSV, based on symptoms and duration  - Considering bacterial infection due to persistence of symptoms for over 1 week  - Initiated empiric antibiotic coverage with Z-Hero for potential mycoplasma pneumonia  - Administered long-acting steroid and Rocephin injections to address inflammation and provide broad-spectrum coverage  - Ordered chest XR to rule out significant pneumonia and guide further management  - Ordered RSV testing for diagnostic confirmation, though treatment plan remains symptomatic    RESPIRATORY INFECTION (SUSPECTED BRONCHITIS/PNEUMONIA):  - Evaluated the patient's condition, noting persistent cough since last Thursday and wheezing throughout the lungs, indicating bronchial involvement.  - Observed low oxygen levels, which improved after nebulizer treatment.  - Emphasized the importance of continuing to cough to clear airways, despite discomfort.  - Instructed the patient to continue using nebulizer treatments with albuterol solution every 4 hours as needed.  - Prescribed ProAir (albuterol) inhaler for portable bronchodilation.  - Considered the possibility of walking pneumonia and ordered a chest XR for further assessment.  - Administered a long-acting steroid injection and Rocephin (ceftriaxone) injection for bacterial coverage.  - Prescribed Z-Hero (azithromycin) for potential mycoplasma infection.  - Discussed the possible viral etiology of symptoms, including potential RSV.  - Explained the difference between bronchial involvement and lobar pneumonia based on current presentation.  - Noted that the patient has been  sick for about a week with persistent symptoms.  - Ordered an RSV test to confirm diagnosis.  - Prescribed Z-Tasia (azithromycin) for potential mycoplasma pneumonia.  - Administered Rocephin (ceftriaxone) injection for broad-spectrum antibiotic coverage.  - Ordered a chest XR to evaluate for pneumonia.  - Noted that the patient uses a nebulizer for breathing treatments.  - Recommend continuing use of nebulizer with albuterol every 4-6 hours.  - Administered long-acting steroid injection for inflammation.    HOARSENESS:  - Noted that the patient has had hoarseness since Thursday, with severe voice loss on Friday and Saturday.  - Assessed that the hoarseness is likely due to viral infection and coughing.    FEVER:  - Noted that the patient reports feeling hot at night, suggesting possible fever.    SLEEP ISSUES:  - Noted that the patient reports difficulty sleeping.  - Refilled Xanax (alprazolam) prescription for sleep aid.    MEDICATIONS/SUPPLEMENTS:  - Noted that the patient has recently completed a steroid pack.  - Documented patient's reported allergy to Codeine.    OTHER INSTRUCTIONS:  - Recommend maintaining adequate fluid intake and nutrition.  - Patient to rest and avoid strenuous activities while recovering.    FOLLOW UP:  - Instructed the patient to follow up after chest XR results are available.  - Advised the patient to contact the office if additional antibiotic injection is needed, depending on X-ray findings and clinical response.  - Scheduled follow-up visit on March 7th for bone density scan.  - Follow up on March 5th as scheduled for GI appointment to discuss colonoscopy and potential cancellation of CT.         1. Lower respiratory infection  -     X-Ray Chest PA And Lateral; Future; Expected date: 02/19/2025  -     Discontinue: methylPREDNISolone acetate injection 80 mg  -     cefTRIAXone injection 1 g  -     azithromycin (ZITHROMAX Z-TASIA) 250 MG tablet; Take 2 tablets by mouth on Day 1 and then 1  tablet by mouth daily on day 2 through 6.  Dispense: 6 tablet; Refill: 0  -     albuterol (PROVENTIL HFA) 90 mcg/actuation inhaler; Inhale 2 puffs into the lungs every 4 (four) hours as needed for Wheezing or Shortness of Breath. Rescue  Dispense: 25.5 g; Refill: 11  -     methylPREDNISolone acetate injection 40 mg    2. Primary hypertension  -     X-Ray Chest PA And Lateral; Future; Expected date: 02/19/2025    3. Bronchospasm  -     Discontinue: methylPREDNISolone acetate injection 80 mg  -     cefTRIAXone injection 1 g  -     albuterol (PROVENTIL HFA) 90 mcg/actuation inhaler; Inhale 2 puffs into the lungs every 4 (four) hours as needed for Wheezing or Shortness of Breath. Rescue  Dispense: 25.5 g; Refill: 11  -     methylPREDNISolone acetate injection 40 mg    4. Anxiety  -     ALPRAZolam (XANAX) 0.25 MG tablet; Take 1 tablet (0.25 mg total) by mouth 3 (three) times daily.  Dispense: 30 tablet; Refill: 5  -     POCT Urine Drug Screen ([facility])    5. Cough, unspecified type  -     POCT RSV by Molecular    6. Encounter for long-term (current) use of high-risk medication  -     POCT Urine Drug Screen ([facility])                 Follow up if symptoms worsen or fail to improve.

## 2025-02-19 NOTE — PROGRESS NOTES
Clinic is currently out of [facility]. Informed the daughter to let us know how she is after the zpak is finished. Daughter voiced understanding.

## 2025-03-06 ENCOUNTER — TELEPHONE (OUTPATIENT)
Dept: FAMILY MEDICINE | Facility: CLINIC | Age: 85
End: 2025-03-06
Payer: MEDICARE

## 2025-03-07 ENCOUNTER — HOSPITAL ENCOUNTER (OUTPATIENT)
Dept: RADIOLOGY | Facility: HOSPITAL | Age: 85
Discharge: HOME | End: 2025-03-07
Attending: ANESTHESIOLOGY
Payer: MEDICARE

## 2025-03-07 ENCOUNTER — HOSPITAL ENCOUNTER (OUTPATIENT)
Dept: RADIOLOGY | Facility: HOSPITAL | Age: 85
Discharge: HOME | End: 2025-03-07
Attending: FAMILY MEDICINE
Payer: MEDICARE

## 2025-03-07 ENCOUNTER — RESULTS FOLLOW-UP (OUTPATIENT)
Dept: FAMILY MEDICINE | Facility: CLINIC | Age: 85
End: 2025-03-07

## 2025-03-07 DIAGNOSIS — M54.6 ACUTE BILATERAL THORACIC BACK PAIN: ICD-10-CM

## 2025-03-07 DIAGNOSIS — Z78.0 MENOPAUSE: Chronic | ICD-10-CM

## 2025-03-07 DIAGNOSIS — S22.080A COMPRESSION FRACTURE OF T12 VERTEBRA, INITIAL ENCOUNTER: Primary | ICD-10-CM

## 2025-03-07 DIAGNOSIS — M80.08XA CRUSH FRACTURE OF VERTEBRA DUE TO OSTEOPOROSIS: ICD-10-CM

## 2025-03-07 DIAGNOSIS — Z13.820 SCREENING FOR OSTEOPOROSIS: ICD-10-CM

## 2025-03-07 DIAGNOSIS — M54.50 ACUTE LOW BACK PAIN, UNSPECIFIED BACK PAIN LATERALITY, UNSPECIFIED WHETHER SCIATICA PRESENT: ICD-10-CM

## 2025-03-07 DIAGNOSIS — R10.9 ABDOMINAL PAIN, UNSPECIFIED ABDOMINAL LOCATION: ICD-10-CM

## 2025-03-07 PROCEDURE — A9503 TC99M MEDRONATE: HCPCS

## 2025-03-07 PROCEDURE — 77080 DXA BONE DENSITY AXIAL: CPT | Mod: TC

## 2025-03-07 PROCEDURE — 77080 DXA BONE DENSITY AXIAL: CPT | Mod: 26,,, | Performed by: NUCLEAR MEDICINE

## 2025-03-07 PROCEDURE — 78306 BONE IMAGING WHOLE BODY: CPT | Mod: 26,,, | Performed by: RADIOLOGY

## 2025-03-07 PROCEDURE — 78306 BONE IMAGING WHOLE BODY: CPT | Mod: TC

## 2025-03-07 PROCEDURE — 74176 CT ABD & PELVIS W/O CONTRAST: CPT | Mod: TC

## 2025-03-07 PROCEDURE — 74176 CT ABD & PELVIS W/O CONTRAST: CPT | Mod: 26,,, | Performed by: STUDENT IN AN ORGANIZED HEALTH CARE EDUCATION/TRAINING PROGRAM

## 2025-03-07 RX ORDER — TC 99M MEDRONATE 20 MG/10ML
23.6 INJECTION, POWDER, LYOPHILIZED, FOR SOLUTION INTRAVENOUS
Status: COMPLETED | OUTPATIENT
Start: 2025-03-07 | End: 2025-03-07

## 2025-03-07 RX ORDER — HYDROCODONE BITARTRATE AND ACETAMINOPHEN 7.5; 325 MG/1; MG/1
1 TABLET ORAL EVERY 4 HOURS PRN
Qty: 42 TABLET | Refills: 0 | Status: SHIPPED | OUTPATIENT
Start: 2025-03-07

## 2025-03-07 RX ADMIN — TECHNETIUM TC 99M MEDRONATE 23.6 MILLICURIE: 20 INJECTION, POWDER, LYOPHILIZED, FOR SOLUTION INTRAVENOUS at 09:03

## 2025-03-10 NOTE — PROGRESS NOTES
She states that she would like to talk with Dr. Mueller about this on Wednesday and let us know after that visit.

## 2025-03-21 ENCOUNTER — HOSPITAL ENCOUNTER (OUTPATIENT)
Dept: RADIOLOGY | Facility: HOSPITAL | Age: 85
Discharge: HOME | End: 2025-03-21
Attending: ANESTHESIOLOGY
Payer: MEDICARE

## 2025-03-21 DIAGNOSIS — M80.08XA AGE-RELATED OSTEOPOROSIS WITH CURRENT PATHOLOGICAL FRACTURE OF VERTEBRA: ICD-10-CM

## 2025-03-21 PROCEDURE — 72128 CT CHEST SPINE W/O DYE: CPT | Mod: 26,,, | Performed by: RADIOLOGY

## 2025-03-21 PROCEDURE — 72128 CT CHEST SPINE W/O DYE: CPT | Mod: TC

## 2025-03-27 ENCOUNTER — OFFICE VISIT (OUTPATIENT)
Dept: FAMILY MEDICINE | Facility: CLINIC | Age: 85
End: 2025-03-27
Payer: MEDICARE

## 2025-03-27 VITALS
WEIGHT: 179 LBS | OXYGEN SATURATION: 95 % | TEMPERATURE: 98 F | SYSTOLIC BLOOD PRESSURE: 128 MMHG | HEIGHT: 69 IN | HEART RATE: 77 BPM | BODY MASS INDEX: 26.51 KG/M2 | RESPIRATION RATE: 16 BRPM | DIASTOLIC BLOOD PRESSURE: 78 MMHG

## 2025-03-27 DIAGNOSIS — S22.000A COMPRESSION FRACTURE OF BODY OF THORACIC VERTEBRA: Chronic | ICD-10-CM

## 2025-03-27 DIAGNOSIS — M81.0 AGE-RELATED OSTEOPOROSIS WITHOUT CURRENT PATHOLOGICAL FRACTURE: Primary | Chronic | ICD-10-CM

## 2025-03-27 DIAGNOSIS — M15.0 PRIMARY OSTEOARTHRITIS INVOLVING MULTIPLE JOINTS: Chronic | ICD-10-CM

## 2025-03-27 PROCEDURE — 99214 OFFICE O/P EST MOD 30 MIN: CPT | Mod: ,,, | Performed by: FAMILY MEDICINE

## 2025-03-27 NOTE — PROGRESS NOTES
"Subjective     Patient ID: Tonya Sandoval is a 84 y.o. female.    Chief Complaint: Follow-up and Osteoporosis (Follow up pneumonia,discussed prolia.)    History of Present Illness    CHIEF COMPLAINT:  Patient presents today for follow up regarding abnormal CT findings and back pain    BACK PAIN:  She has vertebral fractures at T11 and T12 with 50% and 30% compression respectively, characterized as moderate and mild height loss. Pain location varies, with primary discomfort in the shoulder area and occasional pain in the lower back region. Pain significantly worsens with activities of daily living such as washing dishes, cooking, and making beds, requiring rest in a seated position for relief.    RESPIRATORY HISTORY:  She has a history of recurrent pneumonia, experiencing it several times nearly every year, with most recent episode in February. She has received one pneumonia vaccination from Dr. Stern.    WEIGHT:  She reports losing over 20 lbs since the first year, with initial rapid weight loss associated with weakness, nervousness, and feeling unwell.      ROS:  Constitutional: +weight loss, -loss in appetite  ENT: +hoarseness  Respiratory: +cough  Cardiovascular: +palpitations  Musculoskeletal: +back pain              Objective   Blood pressure 128/78, pulse 77, temperature 97.6 °F (36.4 °C), temperature source Oral, resp. rate 16, height 5' 9" (1.753 m), weight 81.2 kg (179 lb), SpO2 95%.    Physical Exam  Constitutional:       Appearance: Normal appearance.      Comments: Better mood and less pain.    HENT:      Head: Normocephalic and atraumatic.      Right Ear: External ear normal.      Left Ear: External ear normal.      Nose: Nose normal.      Mouth/Throat:      Mouth: Mucous membranes are moist.   Eyes:      Conjunctiva/sclera: Conjunctivae normal.      Pupils: Pupils are equal, round, and reactive to light.   Cardiovascular:      Rate and Rhythm: Normal rate.      Pulses: Normal pulses.   Pulmonary: "      Effort: Pulmonary effort is normal.   Abdominal:      General: Abdomen is flat.      Palpations: Abdomen is soft.   Musculoskeletal:         General: Normal range of motion.      Cervical back: Normal range of motion and neck supple.      Comments: Does have some dorsal kyphosis , but is looking better.    Skin:     General: Skin is warm and dry.   Neurological:      General: No focal deficit present.      Mental Status: She is alert and oriented to person, place, and time.   Psychiatric:         Mood and Affect: Mood normal.         Behavior: Behavior normal.         Thought Content: Thought content normal.         Judgment: Judgment normal.            Assessment and Plan   Assessment & Plan    M48.54XD Collapsed vertebra, not elsewhere classified, thoracic region, subsequent encounter for fracture with routine healing  M80.08XD Age-related osteoporosis with current pathological fracture, vertebra(e), subsequent encounter for fracture with routine healing    Z87.01 Personal history of pneumonia (recurrent)    IMPRESSION:  - Assessed recent pneumonia and ongoing osteoarthritis pain.  - Reviewed CT Lung Bases showing patchy consolidation, likely residual from recent pneumonia.  - Evaluated T11-T12 vertebral compression fractures (50% and 30% compression respectively).  - Considered kyphoplasty for vertebral fractures, weighing benefits against current pain levels and time since injury.  - Evaluated upper back pain, potentially related to disc bulging and postural changes.    COLLAPSED VERTEBRA (THORACIC REGION):  - Monitored the patient's back pain and overall condition since initial fracture.  - Performed CT on March 21st to evaluate vertebral fractures.  - CT revealed fractures at T11 and T12 vertebrae with 50% and 30% compression respectively.  - Explained the healing process of vertebral fractures and how kyphoplasty works to correct vertebral height.  - Discussed the possibility of kyphoplasty (balloon  procedure) to correct vertebral compression.  - Recommend considering kyphoplasty to correct vertebral compression and improve posture.  - Suggested physical therapy to strengthen back muscles and improve posture.  - Instructed to contact pain clinic to discuss kyphoplasty procedure further and schedule if desired.  - Ordered physical therapy for upper back pain.    AGE-RELATED OSTEOPOROSIS WITH PATHOLOGICAL FRACTURE:  - Evaluated bone density scan showing osteopenia in the back and osteoporosis in the hip with a T-score of -2.9.  - Explained that osteoporosis itself does not cause pain unless there is a fracture.  - Discussed the difference between osteoporosis and osteoarthritis in terms of pain causation.  - Discussed osteoporosis treatment options and educated on Prolia's purpose in strengthening bones and reducing fracture risk.  - Ordered Prolia injection to be administered at hospital infusion center every 6 months for osteoporosis treatment.  - Ordered calcium level check before Prolia injection.    SARCOPENIA AND WEAKNESS:  - Monitored the patient's reported weakness and difficulty performing daily activities.  - Assessed that weakness may be due to deconditioning from prolonged inactivity.  - Recommend physical therapy to improve strength and function.  - Ordered physical therapy for deconditioning.    ABNORMAL WEIGHT LOSS:  - Monitored significant weight loss of over 20 lbs since the beginning of the year.  - Evaluated the weight loss and its potential impact on patient's health.  - Assessed that the weight loss may be related to recent illness and decreased appetite.  - Noted the patient's report of improved eating habits.    HISTORY OF RECURRENT PNEUMONIA:  - Monitored the patient's history of recurrent pneumonia.  - Evaluated recent chest imaging showing bilateral lower lobe infiltrates, possibly related to recent pneumonia.  - Discussed the possibility of residual lung changes from recurrent  pneumonia.  - Noted that the patient received pneumococcal vaccine in the past.  - Instructed the patient to perform deep breathing exercises and   coughing to help clear lungs.    FOLLOW-UP:  - Follow up in June for routine follow-up and lab work.         1. Age-related osteoporosis without current pathological fracture    2. Primary osteoarthritis involving multiple joints    3. Compression fracture of body of thoracic vertebra        Call back for referral to PT and for Prolia orders. She will most likely go and get the kyphon ballon in her spine.          Follow up if symptoms worsen or fail to improve.

## 2025-06-25 ENCOUNTER — OFFICE VISIT (OUTPATIENT)
Dept: FAMILY MEDICINE | Facility: CLINIC | Age: 85
End: 2025-06-25
Payer: MEDICARE

## 2025-06-25 VITALS
OXYGEN SATURATION: 97 % | SYSTOLIC BLOOD PRESSURE: 124 MMHG | HEIGHT: 69 IN | DIASTOLIC BLOOD PRESSURE: 78 MMHG | TEMPERATURE: 97 F | BODY MASS INDEX: 27.25 KG/M2 | WEIGHT: 184 LBS | HEART RATE: 83 BPM | RESPIRATION RATE: 18 BRPM

## 2025-06-25 DIAGNOSIS — J98.01 BRONCHOSPASM: ICD-10-CM

## 2025-06-25 DIAGNOSIS — J22 LOWER RESP. TRACT INFECTION: Primary | ICD-10-CM

## 2025-06-25 PROCEDURE — 99213 OFFICE O/P EST LOW 20 MIN: CPT | Mod: 25,,, | Performed by: FAMILY MEDICINE

## 2025-06-25 PROCEDURE — 96372 THER/PROPH/DIAG INJ SC/IM: CPT | Mod: ,,, | Performed by: FAMILY MEDICINE

## 2025-06-25 RX ORDER — DEXAMETHASONE SODIUM PHOSPHATE 4 MG/ML
4 INJECTION, SOLUTION INTRA-ARTICULAR; INTRALESIONAL; INTRAMUSCULAR; INTRAVENOUS; SOFT TISSUE ONCE
Status: COMPLETED | OUTPATIENT
Start: 2025-06-25 | End: 2025-06-25

## 2025-06-25 RX ORDER — AZITHROMYCIN 250 MG/1
TABLET, FILM COATED ORAL
Qty: 6 TABLET | Refills: 0 | Status: SHIPPED | OUTPATIENT
Start: 2025-06-25

## 2025-06-25 RX ADMIN — DEXAMETHASONE SODIUM PHOSPHATE 4 MG: 4 INJECTION, SOLUTION INTRA-ARTICULAR; INTRALESIONAL; INTRAMUSCULAR; INTRAVENOUS; SOFT TISSUE at 09:06

## 2025-06-25 NOTE — PROGRESS NOTES
"Subjective     Patient ID: Tonya Sandoval is a 85 y.o. female.    Chief Complaint: Cough (Cough productive yellow mucous, congestion, no fever x 3 to 4 days )    History of Present Illness    CHIEF COMPLAINT:  Patient presents today with phlegm and cough.    HISTORY OF PRESENT ILLNESS:  She reports current respiratory symptoms started 3-4 days ago over the weekend. She describes yellow phlegm production with cough that she can partially expectorate. She denies fever, shortness of breath, or feeling particularly unwell. She attributes her illness to recent exposure, noting her daughter has also been recently sick.    MEDICAL HISTORY:  She has a history of pneumonia in March and recently underwent kyphoplasty for compression fracture. She is diagnosed with osteoporosis, with T-scores of spine at -1.3 and hip at -2.9. The compression fracture occurred without clear mechanism of injury. She denies physical therapy following kyphoplasty procedure and acknowledges need to be more careful due to osteoporosis risk. She currently takes medication for reflux.      ROS:  Constitutional: -fevers  ENT: -nasal congestion  Respiratory: -shortness of breath, +productive cough              Objective   Blood pressure 124/78, pulse 83, temperature 97.4 °F (36.3 °C), temperature source Temporal, resp. rate 18, height 5' 9" (1.753 m), weight 83.5 kg (184 lb), SpO2 97%.    Physical Exam  Constitutional:       Appearance: Normal appearance.   HENT:      Head: Normocephalic and atraumatic.      Right Ear: External ear normal.      Left Ear: External ear normal.      Nose: Nose normal.      Mouth/Throat:      Mouth: Mucous membranes are moist.   Eyes:      Conjunctiva/sclera: Conjunctivae normal.      Pupils: Pupils are equal, round, and reactive to light.   Cardiovascular:      Rate and Rhythm: Normal rate and regular rhythm.      Pulses: Normal pulses.      Heart sounds: Normal heart sounds.   Pulmonary:      Effort: Pulmonary " effort is normal.      Breath sounds: Wheezing and rhonchi present.      Comments: Crackles in both lower lungs  Abdominal:      General: Abdomen is flat.      Palpations: Abdomen is soft.   Musculoskeletal:         General: Normal range of motion.      Cervical back: Normal range of motion and neck supple.   Skin:     General: Skin is warm and dry.   Neurological:      General: No focal deficit present.      Mental Status: She is alert and oriented to person, place, and time.   Psychiatric:         Mood and Affect: Mood normal.         Behavior: Behavior normal.         Thought Content: Thought content normal.         Judgment: Judgment normal.            Assessment and Plan   Assessment & Plan    J06.0 Acute laryngopharyngitis  M80.08XA Age-related osteoporosis with current pathological fracture, vertebra(e), initial encounter for fracture  K21.9 Gastro-esophageal reflux disease without esophagitis  Z87.01 Personal history of pneumonia (recurrent)    ACUTE LARYNGOPHARYNGITIS (RESPIRATORY SYMPTOMS):  - Monitored respiratory symptoms, noting yellow phlegm and cough without fever or dyspnea.  - Symptoms are likely due to a viral infection or summer cold.  - Administered steroid injection and prescribed a 3-day pack of oral steroids.  - Recommend continued use of previously prescribed inhaler to manage cough.  - Offered prescription for Tessalon Perles for cough suppression if desired.    AGE-RELATED OSTEOPOROSIS WITH CURRENT PATHOLOGICAL FRACTURE:  - Monitored compression fracture, noting improvement following procedure.  - DEXA scan results show back T-score of -1.3 and hip T-score of -2.9, confirming osteoporosis diagnosis.  - Explained that osteoporosis can cause fractures even without apparent trauma and discussed how arthritis and compression can affect DEXA scan results, potentially masking the severity of osteoporosis.  - Informed patient about the 2-year interval required between DEXA scans.  - Recommend  considering Prolia or Fosamax for management, taking into account patient's existing reflux condition.  - Advised patient to be more careful with physical activities to prevent further fractures and to continue having someone else do yard work to avoid strain on back.    GASTRO-ESOPHAGEAL REFLUX DISEASE:  - Noted patient continues to take medication for gastroesophageal reflux disease.  - This condition was considered when discussing osteoporosis treatment options.    PERSONAL HISTORY OF PNEUMONIA (RECURRENT):  - Addressed patient's concern about pneumonia recurrence since previous episode in March.  - Administered steroid injection and prescribed oral steroids for current respiratory symptoms specifically to prevent progression to pneumonia.  - Recommend continued use of previously prescribed inhaler to help manage current symptoms and prevent recurrence.         1. Lower resp. tract infection    2. Bronchospasm  -     azithromycin (ZITHROMAX Z-TASIA) 250 MG tablet; Take 2 tablets by mouth on Day 1 and then 1 tablet by mouth daily on day 2 through 6.  Dispense: 6 tablet; Refill: 0  -     dexAMETHasone injection 4 mg                 Follow up if symptoms worsen or fail to improve.

## 2025-07-18 ENCOUNTER — OFFICE VISIT (OUTPATIENT)
Dept: FAMILY MEDICINE | Facility: CLINIC | Age: 85
End: 2025-07-18
Payer: MEDICARE

## 2025-07-18 VITALS
OXYGEN SATURATION: 99 % | HEIGHT: 69 IN | BODY MASS INDEX: 27.01 KG/M2 | TEMPERATURE: 98 F | DIASTOLIC BLOOD PRESSURE: 80 MMHG | WEIGHT: 182.38 LBS | HEART RATE: 78 BPM | RESPIRATION RATE: 16 BRPM | SYSTOLIC BLOOD PRESSURE: 136 MMHG

## 2025-07-18 DIAGNOSIS — Z23 NEED FOR VACCINATION: ICD-10-CM

## 2025-07-18 DIAGNOSIS — I10 PRIMARY HYPERTENSION: Primary | Chronic | ICD-10-CM

## 2025-07-18 DIAGNOSIS — E78.5 HYPERLIPIDEMIA, UNSPECIFIED HYPERLIPIDEMIA TYPE: Chronic | ICD-10-CM

## 2025-07-18 DIAGNOSIS — R73.9 HYPERGLYCEMIA: Chronic | ICD-10-CM

## 2025-07-18 DIAGNOSIS — K21.9 GASTROESOPHAGEAL REFLUX DISEASE, UNSPECIFIED WHETHER ESOPHAGITIS PRESENT: Chronic | ICD-10-CM

## 2025-07-18 LAB
ALBUMIN SERPL BCP-MCNC: 3.6 G/DL (ref 3.4–4.8)
ALBUMIN/GLOB SERPL: 0.9 {RATIO}
ALP SERPL-CCNC: 81 U/L (ref 40–150)
ALT SERPL W P-5'-P-CCNC: 13 U/L
ANION GAP SERPL CALCULATED.3IONS-SCNC: 10 MMOL/L (ref 7–16)
AST SERPL W P-5'-P-CCNC: 23 U/L (ref 11–45)
BASOPHILS # BLD AUTO: 0.05 K/UL (ref 0–0.2)
BASOPHILS NFR BLD AUTO: 0.7 % (ref 0–1)
BILIRUB SERPL-MCNC: 0.6 MG/DL
BUN SERPL-MCNC: 25 MG/DL (ref 10–20)
BUN/CREAT SERPL: 28 (ref 6–20)
CALCIUM SERPL-MCNC: 9.2 MG/DL (ref 8.4–10.2)
CHLORIDE SERPL-SCNC: 103 MMOL/L (ref 98–107)
CHOLEST SERPL-MCNC: 159 MG/DL
CHOLEST/HDLC SERPL: 3.3 {RATIO}
CO2 SERPL-SCNC: 31 MMOL/L (ref 23–31)
CREAT SERPL-MCNC: 0.88 MG/DL (ref 0.55–1.02)
DIFFERENTIAL METHOD BLD: ABNORMAL
EGFR (NO RACE VARIABLE) (RUSH/TITUS): 64 ML/MIN/1.73M2
EOSINOPHIL # BLD AUTO: 0.32 K/UL (ref 0–0.5)
EOSINOPHIL NFR BLD AUTO: 4.6 % (ref 1–4)
ERYTHROCYTE [DISTWIDTH] IN BLOOD BY AUTOMATED COUNT: 12.5 % (ref 11.5–14.5)
EST. AVERAGE GLUCOSE BLD GHB EST-MCNC: 128 MG/DL
GLOBULIN SER-MCNC: 3.8 G/DL (ref 2–4)
GLUCOSE SERPL-MCNC: 88 MG/DL (ref 82–115)
HBA1C MFR BLD HPLC: 6.1 %
HCT VFR BLD AUTO: 40.7 % (ref 38–47)
HDLC SERPL-MCNC: 48 MG/DL (ref 35–60)
HGB BLD-MCNC: 13.2 G/DL (ref 12–16)
IMM GRANULOCYTES # BLD AUTO: 0.02 K/UL (ref 0–0.04)
IMM GRANULOCYTES NFR BLD: 0.3 % (ref 0–0.4)
LDLC SERPL CALC-MCNC: 84 MG/DL
LDLC/HDLC SERPL: 1.8 {RATIO}
LYMPHOCYTES # BLD AUTO: 1.88 K/UL (ref 1–4.8)
LYMPHOCYTES NFR BLD AUTO: 27 % (ref 27–41)
MCH RBC QN AUTO: 29.4 PG (ref 27–31)
MCHC RBC AUTO-ENTMCNC: 32.4 G/DL (ref 32–36)
MCV RBC AUTO: 90.6 FL (ref 80–96)
MONOCYTES # BLD AUTO: 0.68 K/UL (ref 0–0.8)
MONOCYTES NFR BLD AUTO: 9.8 % (ref 2–6)
MPC BLD CALC-MCNC: 11.9 FL (ref 9.4–12.4)
NEUTROPHILS # BLD AUTO: 4.02 K/UL (ref 1.8–7.7)
NEUTROPHILS NFR BLD AUTO: 57.6 % (ref 53–65)
NONHDLC SERPL-MCNC: 111 MG/DL
NRBC # BLD AUTO: 0 X10E3/UL
NRBC, AUTO (.00): 0 %
PLATELET # BLD AUTO: 247 K/UL (ref 150–400)
POTASSIUM SERPL-SCNC: 4.2 MMOL/L (ref 3.5–5.1)
PROT SERPL-MCNC: 7.4 G/DL (ref 5.8–7.6)
RBC # BLD AUTO: 4.49 M/UL (ref 4.2–5.4)
SODIUM SERPL-SCNC: 140 MMOL/L (ref 136–145)
TRIGL SERPL-MCNC: 136 MG/DL (ref 37–140)
VLDLC SERPL-MCNC: 27 MG/DL
WBC # BLD AUTO: 6.97 K/UL (ref 4.5–11)

## 2025-07-18 RX ORDER — METOPROLOL SUCCINATE 25 MG/1
25 TABLET, EXTENDED RELEASE ORAL DAILY
Qty: 90 TABLET | Refills: 3 | Status: SHIPPED | OUTPATIENT
Start: 2025-07-18

## 2025-07-18 RX ORDER — LANSOPRAZOLE 30 MG/1
30 CAPSULE, DELAYED RELEASE ORAL DAILY
Qty: 90 CAPSULE | Refills: 3 | Status: SHIPPED | OUTPATIENT
Start: 2025-07-18

## 2025-07-18 RX ORDER — OLMESARTAN MEDOXOMIL AND HYDROCHLOROTHIAZIDE 40/25 40; 25 MG/1; MG/1
1 TABLET ORAL DAILY
Qty: 90 TABLET | Refills: 3 | Status: SHIPPED | OUTPATIENT
Start: 2025-07-18

## 2025-07-18 RX ORDER — ATORVASTATIN CALCIUM 20 MG/1
20 TABLET, FILM COATED ORAL DAILY
Qty: 90 TABLET | Refills: 3 | Status: SHIPPED | OUTPATIENT
Start: 2025-07-18

## 2025-07-18 NOTE — PROGRESS NOTES
"Subjective     Patient ID: Tonya Sandoval is a 85 y.o. female.    Chief Complaint: Follow-up (6 month)    History of Present Illness    CHIEF COMPLAINT:  Patient presents today for routine follow-up of medications.    RESPIRATORY CONCERNS:  She reports difficulty expectorating mucus, describing a sensation of mucus presence but inability to effectively cough it up. She attributes these symptoms to nasal drainage into her respiratory tract. She denies significant wheezing and reports minimal active coughing. She has a history of pneumonia over the past two years.    ALLERGIES:  She reports new onset of morning sneezing episodes. She denies prior history of allergies but notes potential dust sensitivity. Symptoms are intermittent and specifically occur in the morning hours.    FAMILY HISTORY:  Her daughter has a history of recurrent bronchitis.    MEDICATIONS:  She takes Lipitor and Prevacid, obtaining three-month supplies through [facility].    IMMUNIZATIONS:  Tetanus shot was administered in 2016. Pneumovax 23 and Prevnar 13 were received in 2015 and 2017 respectively.      ROS:  ENT: +rhinorrhea, +nasal discharge  Respiratory: -wheezing, +productive cough  Allergic: +frequent sneezing              Objective   Blood pressure 136/80, pulse 78, temperature 97.8 °F (36.6 °C), temperature source Oral, resp. rate 16, height 5' 9" (1.753 m), weight 82.7 kg (182 lb 6.4 oz), SpO2 99%.    Physical Exam  Constitutional:       Appearance: Normal appearance.   HENT:      Head: Normocephalic and atraumatic.      Right Ear: External ear normal.      Left Ear: External ear normal.      Nose: Nose normal.      Mouth/Throat:      Mouth: Mucous membranes are moist.   Eyes:      Conjunctiva/sclera: Conjunctivae normal.      Pupils: Pupils are equal, round, and reactive to light.   Cardiovascular:      Rate and Rhythm: Normal rate and regular rhythm.      Pulses: Normal pulses.      Heart sounds: Normal heart sounds. "   Pulmonary:      Effort: Pulmonary effort is normal.      Breath sounds: Normal breath sounds.      Comments: Some slight crackles noted throughout chest  Abdominal:      General: Abdomen is flat.      Palpations: Abdomen is soft.   Musculoskeletal:         General: Normal range of motion.      Cervical back: Normal range of motion and neck supple.   Skin:     General: Skin is warm and dry.   Neurological:      General: No focal deficit present.      Mental Status: She is alert and oriented to person, place, and time.   Psychiatric:         Mood and Affect: Mood normal.         Behavior: Behavior normal.         Thought Content: Thought content normal.         Judgment: Judgment normal.            Assessment and Plan   Assessment & Plan    E78.5 Hyperlipidemia, unspecified  J30.9 Allergic rhinitis, unspecified  R09.3 Abnormal sputum  R09.82 Postnasal drip  Z82.5 Family history of asthma and other chronic lower respiratory diseases  Z87.01 Personal history of pneumonia (recurrent)    HYPERLIPIDEMIA:  - Continued Lipitor (atorvastatin), Levetor, Toble, and Prevacid with refills for 1 year through [facility].  - Noted that cholesterol levels were last checked in September, 1 year ago.  - Ordered comprehensive lab work including cholesterol, potassium, renal function, blood count, and A1C as part of routine monitoring for hyperlipidemia management.    ALLERGIC RHINITIS:  - Patient reports sneezing frequently in the morning, possibly due to dust allergies, with associated nasal drainage.  - Considered Singulair (montelukast) for allergy symptoms, explaining its benefits for stuffy, drippy nose and cough, and its mechanism of action compared to antihistamines.  - Patient declines adding a new medication at this time.    ABNORMAL SPUTUM:  - Evaluated patient's difficulty coughing up sputum, likely related to nasal drainage.  - Considered Singulair for cough and wheezy symptoms, but patient declined new medication at  this time.    POSTNASAL DRIP:  - Evaluated nasal drainage consistent with postnasal drip.  - Considered Singulair for symptom management, but patient declined new medication at this time.    FAMILY HISTORY OF RESPIRATORY ISSUES:  - Noted family history of respiratory issues, specifically bronchitis.  - Assessed risk factors for RSV and recommended vaccination, advising patient to consider getting RSV vaccine at the pharmacy (e.g., [facility]).    HISTORY OF PNEUMONIA AND RESPIRATORY ISSUES:  - Considered patient's history of pneumonia and recurrent respiratory issues when recommending vaccinations.  - Explained importance of RSV vaccination for adults over 75 and those with respiratory conditions.  - Provided information on differences between pneumococcal vaccines (Pneumovax 23, Prevnar 13, Prevnar 20) and administered Prevnar 20 vaccine in office to boost protection against pneumococcal pneumonia.  - Discussed pertussis risk and waning effectiveness of whooping cough vaccine after 5-6 years; patient to consider Tdap booster next year.    FOLLOW-UP:  - Follow up between January and March of next year.  - Advised to ensure they have seen a provider and had lab work done by next fall when current medication prescriptions will need renewal.         1. Primary hypertension  -     Hemoglobin A1C  -     Lipid Panel  -     CBC Auto Differential  -     Comprehensive Metabolic Panel  -     olmesartan-hydrochlorothiazide (BENICAR HCT) 40-25 mg per tablet; Take 1 tablet by mouth once daily.  Dispense: 90 tablet; Refill: 3  -     metoprolol succinate (TOPROL-XL) 25 MG 24 hr tablet; Take 1 tablet (25 mg total) by mouth once daily.  Dispense: 90 tablet; Refill: 3    2. Hyperglycemia  -     Hemoglobin A1C  -     Lipid Panel  -     CBC Auto Differential  -     Comprehensive Metabolic Panel    3. Hyperlipidemia, unspecified hyperlipidemia type  -     Hemoglobin A1C  -     Lipid Panel  -     CBC Auto Differential  -     Comprehensive  Metabolic Panel  -     atorvastatin (LIPITOR) 20 MG tablet; Take 1 tablet (20 mg total) by mouth once daily.  Dispense: 90 tablet; Refill: 3    4. Gastroesophageal reflux disease, unspecified whether esophagitis present  -     lansoprazole (PREVACID) 30 MG capsule; Take 1 capsule (30 mg total) by mouth once daily.  Dispense: 90 capsule; Refill: 3    5. Need for vaccination  -     pneumoc 20-edmundo conj-dip cr(PF) (PREVNAR-20 (PF)) injection Syrg 0.5 mL      This note was generated with the assistance of ambient listening technology. Verbal consent was obtained by the patient and accompanying visitor(s) for the recording of patient appointment to facilitate this note. I attest to having reviewed and edited the generated note for accuracy, though some syntax or spelling errors may persist. Please contact the author of this note for any clarification.             Follow up in about 6 months (around 1/18/2026), or if symptoms worsen or fail to improve.